# Patient Record
Sex: FEMALE | Race: WHITE | Employment: OTHER | URBAN - METROPOLITAN AREA
[De-identification: names, ages, dates, MRNs, and addresses within clinical notes are randomized per-mention and may not be internally consistent; named-entity substitution may affect disease eponyms.]

---

## 2017-01-03 ENCOUNTER — GENERIC CONVERSION - ENCOUNTER (OUTPATIENT)
Dept: OTHER | Facility: OTHER | Age: 72
End: 2017-01-03

## 2017-01-05 ENCOUNTER — GENERIC CONVERSION - ENCOUNTER (OUTPATIENT)
Dept: OTHER | Facility: OTHER | Age: 72
End: 2017-01-05

## 2017-01-12 ENCOUNTER — GENERIC CONVERSION - ENCOUNTER (OUTPATIENT)
Dept: OTHER | Facility: OTHER | Age: 72
End: 2017-01-12

## 2017-01-18 ENCOUNTER — GENERIC CONVERSION - ENCOUNTER (OUTPATIENT)
Dept: OTHER | Facility: OTHER | Age: 72
End: 2017-01-18

## 2017-04-06 ENCOUNTER — GENERIC CONVERSION - ENCOUNTER (OUTPATIENT)
Dept: OTHER | Facility: OTHER | Age: 72
End: 2017-04-06

## 2017-05-10 ENCOUNTER — ALLSCRIPTS OFFICE VISIT (OUTPATIENT)
Dept: OTHER | Facility: OTHER | Age: 72
End: 2017-05-10

## 2017-05-10 ENCOUNTER — GENERIC CONVERSION - ENCOUNTER (OUTPATIENT)
Dept: OTHER | Facility: OTHER | Age: 72
End: 2017-05-10

## 2017-05-10 DIAGNOSIS — I10 ESSENTIAL (PRIMARY) HYPERTENSION: ICD-10-CM

## 2017-05-10 DIAGNOSIS — E11.69 TYPE 2 DIABETES MELLITUS WITH OTHER SPECIFIED COMPLICATION (HCC): ICD-10-CM

## 2017-05-10 DIAGNOSIS — K76.0 FATTY (CHANGE OF) LIVER, NOT ELSEWHERE CLASSIFIED: ICD-10-CM

## 2017-05-10 DIAGNOSIS — F41.8 OTHER SPECIFIED ANXIETY DISORDERS: ICD-10-CM

## 2017-05-10 DIAGNOSIS — E11.9 TYPE 2 DIABETES MELLITUS WITHOUT COMPLICATIONS (HCC): ICD-10-CM

## 2017-06-21 ENCOUNTER — ALLSCRIPTS OFFICE VISIT (OUTPATIENT)
Dept: OTHER | Facility: OTHER | Age: 72
End: 2017-06-21

## 2017-09-18 ENCOUNTER — ALLSCRIPTS OFFICE VISIT (OUTPATIENT)
Dept: OTHER | Facility: OTHER | Age: 72
End: 2017-09-18

## 2017-09-18 LAB
BILIRUB UR QL STRIP: 1
CLARITY UR: NORMAL
COLOR UR: CLEAR
GLUCOSE (HISTORICAL): NORMAL
HGB UR QL STRIP.AUTO: NORMAL
KETONES UR STRIP-MCNC: NORMAL MG/DL
LEUKOCYTE ESTERASE UR QL STRIP: 75
NITRITE UR QL STRIP: NORMAL
PH UR STRIP.AUTO: 5 [PH]
PROT UR STRIP-MCNC: NORMAL MG/DL
SP GR UR STRIP.AUTO: 1.02
UROBILINOGEN UR QL STRIP.AUTO: NORMAL

## 2017-09-20 LAB
CULTURE RESULT (HISTORICAL): ABNORMAL
MISCELLANEOUS LAB TEST RESULT (HISTORICAL): ABNORMAL
RESULT 2 (HISTORICAL): ABNORMAL
SUSCEP. REFLEX (HISTORICAL): ABNORMAL

## 2017-09-24 ENCOUNTER — GENERIC CONVERSION - ENCOUNTER (OUTPATIENT)
Dept: OTHER | Facility: OTHER | Age: 72
End: 2017-09-24

## 2017-11-03 ENCOUNTER — ALLSCRIPTS OFFICE VISIT (OUTPATIENT)
Dept: OTHER | Facility: OTHER | Age: 72
End: 2017-11-03

## 2017-11-07 NOTE — PROGRESS NOTES
Assessment  1  Asthma (493 90) (J45 909)   2  Acute bronchitis (466 0) (J20 9)    Plan  Asthma    · Advair Diskus 500-50 MCG/DOSE Inhalation Aerosol Powder Breath Activated;  INHALE 1 PUFF TWICE DAILY   · Albuterol Sulfate (2 5 MG/3ML) 0 083% Inhalation Nebulization Solution; USE 1  VIAL VIA NEBULIZER FOUR TIMES DAILY AS NEEDED   · Montelukast Sodium 10 MG Oral Tablet; 1 EVERY MORNING  PMH: Asthmatic bronchitis    · Azithromycin 250 MG Oral Tablet (Zithromax Z-Peter); TAKE 2 TABLETS ON DAY 1  THEN TAKE 1 TABLET A DAY FOR 4 DAYS   · PredniSONE 10 MG Oral Tablet; 4 tabs po d1, 3 tabs po d2, 2 tabs po d3, 1 tab  po d4, 1/2 tab po d5&6    Discussion/Summary  Possible side effects of new medications were reviewed with the patient/guardian today  The treatment plan was reviewed with the patient/guardian  The patient/guardian understands and agrees with the treatment plan      Chief Complaint  pt here for f/u for asthma   she hasn't been feeling well (breathing)  tc/cma      Advance Directives  Advance Directive  Luke:   The patient is not in agreement to receive the Advance Care Planning service--    NO - Patient does not have an advance health care directive  Summary of Advance Directive Conversation  pt was given 5 wishes to read and return  Active Problems  1  Acute bronchitis (466 0) (J20 9)   2  Asthma (493 90) (J45 909)   3  Benign essential hypertension (401 1) (I10)   4  Borderline hyperlipidemia (272 4) (E78 5)   5  Depression with anxiety (300 4) (F41 8)   6  DM type 2 with diabetic mixed hyperlipidemia (250 80,272  2) (E11 69,E78 2)   7  Dysuria (788 1) (R30 0)   8  Eczema (692 9) (L30 9)   9  Elevated ALT measurement (790 4) (R74 0)   10  Encounter for other screening for malignant neoplasm of breast (V76 19) (Z12 39)   11  Encounter for screening colonoscopy (V76 51) (Z12 11)   12  Encounter for screening mammogram for malignant neoplasm of breast (V76 12)    (Z12 31)   13   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)   14  Gastroesophageal reflux disease with esophagitis (530 11) (K21 0)   15  Hair loss (704 00) (L65 9)   16  Hyperlipidemia (272 4) (E78 5)   17  Medicare annual wellness visit, subsequent (V70 0) (Z00 00)   18  Mitral valve disorder (424 0) (I05 9)   19  Nonalcoholic fatty liver disease (571 8) (K76 0)   20  Oral thrush (112 0) (B37 0)   21  Osteoporosis (733 00) (M81 0)   22  Pulmonary nodule seen on imaging study (793 11) (R91 1)   23  Risk of exposure to Lyme disease (V15 89) (Z91 89)   24  Severe persistent asthma with acute exacerbation (493 92) (J45 51)   25  Suspected sleep apnea (780 57) (G47 30)   26  Type 2 diabetes mellitus (250 00) (E11 9)   27  Vitamin D deficiency (268 9) (E55 9)    Past Medical History  1  History of Abdominal pain, epigastric (789 06) (R10 13)   2  History of Abdominal pain, LLQ (left lower quadrant) (789 04) (R10 32)   3  History of Acute febrile illness (780 60) (R50 9)   4  History of Acute upper respiratory infection (465 9) (J06 9)   5  History of Asthma with acute exacerbation (493 92) (J45 901)   6  History of Asthmatic bronchitis (493 90) (J45 909)   7  History of Bilateral renal cysts (753 10) (N28 1)   8  History of Bloating (787 3) (R14 0)   9  History of BMI 38 0-38 9,adult (V85 38) (Z68 38)   10  History of Body mass index (BMI) of 34 0 to 34 9 in adult (V85 34) (Z68 34)   11  History of Body mass index (BMI) of 37 0 to 37 9 in adult (V85 37) (Z68 37)   12  History of Cough (786 2) (R05)   13  History of Cough (786 2) (R05)   14  History of Eczema (692 9) (L30 9)   15  History of Female pelvic pain (625 9) (R10 2)   16  History of Hirsutism (704 1) (L68 0)   17  History of acute bronchitis (V12 69) (Z87 09)   18  History of acute bronchitis (V12 69) (Z87 09)   19  History of acute bronchitis (V12 69) (Z87 09)   20  History of acute pharyngitis (V12 69) (Z87 09)   21  History of acute sinusitis (V12 69) (Z87 09)   22   History of allergic rhinitis (V12 69) (Z87 09)   23  History of arthritis (V13 4) (Z87 39)   24  History of dermatitis (V13 3) (Z87 2)   25  History of fever (V13 89) (Z87 898)   26  History of heartburn (V12 79) (Z87 898)   27  History of hiatal hernia (V12 79) (Z87 19)   28  History of pleurisy (V12 69) (Z87 09)   29  History of pneumonia (V12 61) (Z87 01)   30  History of sinusitis (V12 69) (Z87 09)   31  History of Hot flashes (627 2) (N95 1)   32  History of Nasal congestion (478 19) (R09 81)   33  History of Right-sided chest wall pain (786 52) (R07 89)   34  History of Sinusitis (473 9) (J32 9)   35  History of Sore in nose (478 19) (J34 89)   36  History of Umbilical hernia (619 6) (K42 9)    Surgical History  1  History of Appendectomy   2  History of Incisional Breast Biopsy   3  History of Ovarian Cystectomy   4  History of Tonsillectomy    Family History  Mother    1  Family history of Benign essential hypertension with target blood pressure below 140/90   2  Family history of Ovarian Cancer (V16 41)  Father    3  Family history of Benign essential hypertension with target blood pressure below 140/90    Social History   · Being A Social Drinker   · Daily alcohol use   · Former smoker (V15 82) (H57 902)   · Marital History - Currently    ·    · Occupation   · Denied: History of Pets / animals   · Denied: History of Second hand smoke exposure   · Denied: History of Travel history    Current Meds   1  Advair Diskus 500-50 MCG/DOSE Inhalation Aerosol Powder Breath Activated; INHALE 1   PUFF TWICE DAILY; Therapy: 21Jun2017 to (Last Rx:21Jun2017) Ordered   2  Albuterol Sulfate (2 5 MG/3ML) 0 083% Inhalation Nebulization Solution; USE 1 VIAL VIA   NEBULIZER FOUR TIMES DAILY AS NEEDED; Therapy: 90TJG8699 to (Evaluate:21Jun2017)  Requested for: 91MPX1624; Last   Rx:10May2017 Ordered   3  ALPRAZolam 0 5 MG Oral Tablet; ONE TAB PO BID PRN; Therapy: 72LNT8493 to (Last Rx:97Dlh3975) Ordered   4   AmLODIPine Besylate 5 MG Oral Tablet; One tab po qd; Therapy: 02Apr2012 to (Last Rx:84Mjc8801)  Requested for: 14CBC2081 Ordered   5  Betamethasone Dipropionate 0 05 % External Cream; APPLY SPARINGLY TO AFFECTED   AREA(S) TWICE DAILY; Therapy: 03RCW8450 to (Last Rx:10May2017)  Requested for: 24BUD2326 Ordered   6  Biotin 5000 MCG Oral Capsule; TAKE 2 CAPSULE Daily; Therapy: (Recorded:04Nov2016) to Recorded   7  Caltrate 600+D 600-400 MG-UNIT TABS; 1 Two Times A Day; Therapy: 22PLZ2667 to  Requested for: 23YTN3691 Recorded   8  Clotrimazole-Betamethasone 1-0 05 % External Cream; APPLY SPARINGLY TO THE   AFFECTED AREA(S) TWICE DAILY; Therapy: 39SOO1636 to (Evaluate:14Aol3673)  Requested for: 72JBG4219; Last   Rx:78Say8062 Ordered   9  Magnesium 250 MG Oral Tablet; Therapy: (Recorded:43Sxr2760) to Recorded   10  Montelukast Sodium 10 MG Oral Tablet; 1 EVERY MORNING; Therapy: 02Apr2012 to (Last Rx:10May2017)  Requested for: 53API6122 Ordered   11  Nitrofurantoin Monohyd Macro 100 MG Oral Capsule; one bid x 7d; Therapy: 39ITJ6526 to (Last Louie Jang)  Requested for: 36ZZP7769 Ordered   12  Omeprazole 40 MG Oral Capsule Delayed Release; one cap po qd; Last Rx:95Xhi4046    Ordered   13  Simvastatin 20 MG Oral Tablet; One tab po qd; Therapy: 02Apr2012 to (Last Rx:10May2017)  Requested for: 23GHK2172 Ordered   14  Vitamin B Complex Oral Tablet; 1 Every Day; Therapy: 47RWU1813 to  Requested for: 79NIO8251 Recorded   15  Vitamin D-1000 Max [address] 1000 UNIT Oral Tablet; 2 Every Morning; Therapy: 21PCI2331 to  Requested for: 07RVR4319 Recorded   16  Zyrtec 10 MG TABS; one qd; Therapy: 39Wdu4650 to  Requested for: 92KGO3333 Recorded    Allergies  1  ASPIRIN   2  CIPRO   3  Cimetidine TABS  4  Eggs   5  Hazelnuts   6   Nuts    Vitals  Vital Signs    Recorded: 86ZQZ7541 09:34AM   Temperature 98 9 F, Temporal   Heart Rate 84, R Radial   Pulse Quality Normal, R Radial   Respiration Quality Normal   Respiration 16   Systolic 018, LUE, Sitting Diastolic 68, LUE, Sitting   Height 5 ft 4 in   Weight 194 lb    BMI Calculated 33 3   BSA Calculated 1 93   O2 Saturation 95     Health Management  Encounter for screening mammogram for malignant neoplasm of breast   * MAMMO SCREENING BILATERAL W CAD; every 1 year;  Last 29LLI8060; Next Due: 93ITS8555;  Near Due    Signatures   Electronically signed by : RONNY Valadez ; Nov 6 2017  8:17AM EST                       (Author)

## 2017-12-13 ENCOUNTER — ALLSCRIPTS OFFICE VISIT (OUTPATIENT)
Dept: OTHER | Facility: OTHER | Age: 72
End: 2017-12-13

## 2018-01-25 VITALS
SYSTOLIC BLOOD PRESSURE: 156 MMHG | RESPIRATION RATE: 16 BRPM | DIASTOLIC BLOOD PRESSURE: 86 MMHG | TEMPERATURE: 98.3 F | SYSTOLIC BLOOD PRESSURE: 144 MMHG | SYSTOLIC BLOOD PRESSURE: 126 MMHG | DIASTOLIC BLOOD PRESSURE: 72 MMHG | WEIGHT: 186 LBS | WEIGHT: 194 LBS | WEIGHT: 206 LBS | BODY MASS INDEX: 35.17 KG/M2 | SYSTOLIC BLOOD PRESSURE: 130 MMHG | TEMPERATURE: 98.9 F | OXYGEN SATURATION: 95 % | WEIGHT: 202 LBS | TEMPERATURE: 97.9 F | HEART RATE: 80 BPM | RESPIRATION RATE: 16 BRPM | OXYGEN SATURATION: 95 % | HEART RATE: 82 BPM | OXYGEN SATURATION: 93 % | RESPIRATION RATE: 22 BRPM | TEMPERATURE: 98.2 F | BODY MASS INDEX: 31.76 KG/M2 | HEIGHT: 64 IN | BODY MASS INDEX: 33.12 KG/M2 | DIASTOLIC BLOOD PRESSURE: 64 MMHG | DIASTOLIC BLOOD PRESSURE: 68 MMHG | HEIGHT: 64 IN | BODY MASS INDEX: 34.49 KG/M2 | HEART RATE: 84 BPM | RESPIRATION RATE: 18 BRPM | HEIGHT: 64 IN | OXYGEN SATURATION: 97 % | HEIGHT: 64 IN | HEART RATE: 90 BPM

## 2018-01-25 NOTE — MISCELLANEOUS
Message  Called pt to notify her of the results of the CT chest       Signatures   Electronically signed by : RONNY Peres ; Jan 19 2017  1:56PM EST                       (Author)

## 2018-01-25 NOTE — RESULT NOTES
Verified Results  Urine Dip Automated- POC 01Qjl2941 11:43AM Larissa Bone     Test Name Result Flag Reference   Color Clear     Clarity Transparent     Leukocytes 75     Nitrite neg     Blood neg     Bilirubin 1     Urobilinogen norm     Protein neg     Ph 5     Specific Gravity 1 020     Ketone neg     Glucose norm         Plan  Acute bronchitis, Dysuria    · Sulfamethoxazole-Trimethoprim 800-160 MG Oral Tablet; 1tab po bid  Dysuria    · (1) URINE CULTURE; Source:Urine, Clean Catch; Status:Active;  Requested  for:36Cir3122;    · Urine Dip Automated- POC; Status:Complete;   Done: 33ELS9170 11:43AM

## 2018-01-25 NOTE — RESULT NOTES
Verified Results  * XR CHEST PA & LATERAL 48Grf2935 03:00PM Steven Dumont Order Number: [id number]     Test Name Result Flag Reference   XR CHEST PA & LATERAL (Report)     CHEST      INDICATION: Chest pain  COMPARISON: 10/5/2015     VIEWS: Frontal and lateral projections; 2 images     FINDINGS:        Cardiomediastinal silhouette appears unremarkable  The lungs are clear  Scarring at the left lung base stable  No pneumothorax or pleural effusion  Visualized osseous structures appear within normal limits for the patient's age  IMPRESSION:     No active pulmonary disease  Workstation performed: [id number]     Signed by:   Rik Colon MD   8/29/16       Discussion/Summary   chest xray showing no active disease or infection  She should keep her recheck on wed Signatures   Electronically signed by : ELBA Christensen;  Aug 30 2016  9:26AM EST                       (Author)

## 2018-01-25 NOTE — RESULT NOTES
Verified Results  (1) AMYLASE 71AOW3037 07:32AM [facility]     Test Name Result Flag Reference   Amylase, Serum 50 U/L       (1) CBC/PLT/DIFF 20ZLP0946 07:32AM [facility]     Test Name Result Flag Reference   WBC 5 3 x10E3/uL  3 4-10 8   RBC 4 85 x10E6/uL  3 77-5 28   Hemoglobin 15 0 g/dL  11 1-15 9   Hematocrit 43 6 %  34 0-46  6   MCV 90 fL  79-97   MCH 30 9 pg  26 6-33 0   MCHC 34 4 g/dL  31 5-35 7   RDW 13 0 %  12 3-15 4   Platelets 438 D85O0/LN  150-379   Neutrophils 33 %     Lymphs 57 %     Monocytes 5 %     Eos 4 %     Basos 1 %     Neutrophils (Absolute) 1 8 x10E3/uL  1 4-7 0   Lymphs (Absolute) 3 1 x10E3/uL  0 7-3 1   Monocytes(Absolute) 0 3 x10E3/uL  0 1-0 9   Eos (Absolute) 0 2 x10E3/uL  0 0-0 4   Baso (Absolute) 0 0 x10E3/uL  0 0-0 2   Immature Granulocytes 0 %     Immature Grans (Abs) 0 0 x10E3/uL  0 0-0 1     (1) COMPREHENSIVE METABOLIC PANEL 68BZU9303 01:02XG [facility]     Test Name Result Flag Reference   Glucose, Serum 105 mg/dL H 65-99   BUN 14 mg/dL  8-27   Creatinine, Serum 0 71 mg/dL  0 57-1 00   eGFR If NonAfricn Am 87 mL/min/1 73  >59   eGFR If Africn Am 100 mL/min/1 73  >59   BUN/Creatinine Ratio 20  11-26   Sodium, Serum 139 mmol/L  134-144   Potassium, Serum 4 2 mmol/L  3 5-5 2   Chloride, Serum 100 mmol/L     Carbon Dioxide, Total 22 mmol/L  18-29   Calcium, Serum 9 2 mg/dL  8 7-10 3   Protein, Total, Serum 6 5 g/dL  6 0-8 5   Albumin, Serum 4 2 g/dL  3 6-4 8   Globulin, Total 2 3 g/dL  1 5-4 5   A/G Ratio 1 8  1 1-2 5   Bilirubin, Total 0 7 mg/dL  0 0-1 2   Alkaline Phosphatase, S 66 IU/L     AST (SGOT) 23 IU/L  0-40   ALT (SGPT) 28 IU/L  0-32     (1) HEMOGLOBIN A1C 31TKR4855 07:32AM Mariella Ceron     Test Name Result Flag Reference   Hemoglobin A1c 5 7 % H 4 8-5 6   Pre-diabetes: 5 7 - 6 4           Diabetes: >6 4           Glycemic control for adults with diabetes: <7 0     (1) LIPASE 24Jun2016 07:32AM Juanita Chakraborty     Test Name Result Flag Reference   Lipase, Serum 27 U/L  0-59     (1) LIPID PANEL, FASTING 24Jun2016 07:32AM [facility]     Test Name Result Flag Reference   Cholesterol, Total 146 mg/dL  100-199   Triglycerides 144 mg/dL  0-149   HDL Cholesterol 49 mg/dL  >39   According to ATP-III Guidelines, HDL-C >59 mg/dL is considered a  negative risk factor for CHD  VLDL Cholesterol Sen 29 mg/dL  5-40   LDL Cholesterol Calc 68 mg/dL  0-99   T  Chol/HDL Ratio 3 0 ratio units  0 0-4 4   T  Chol/HDL Ratio                                                             Men  Women                                               1/2 Avg  Risk  3 4    3 3                                                   Avg Risk  5 0    4 4                                                2X Avg  Risk  9 6    7 1                                                3X Avg  Risk 23 4   11 0     (1) TSH 74PME0949 07:32AM [facility]     Test Name Result Flag Reference   TSH 1 920 uIU/mL  0 450-4 500     (1) VITAMIN D 25-HYDROXY 24Jun2016 07:32AM [facility]     Test Name Result Flag Reference   Vitamin D, 25-Hydroxy 37 2 ng/mL  30 0-100 0   Vitamin D deficiency has been defined by the [address] [facility] [address] practice guideline as a  level of serum 25-OH vitamin D less than 20 ng/mL (1,2)  The Endocrine Society went on to further define vitamin D  insufficiency as a level between 21 and 29 ng/mL (2)  1  IOM ([place] [employer])  2010  Dietary reference     intakes for calcium and D  [facility]: The     Mommy [place]arest  2  Alex MF, Love NC, Yoni MEAD, et al      Evaluation, treatment, and prevention of vitamin D     deficiency: an Endocrine Society clinical practice     guideline  JCEM  2011 Jul; 96(7):1911-30       [facility]) Cardiovascular Risk Assessment 24Jun2016 07:32AM [facility]     Test Name Result Flag Reference   Interpretation Note     -------------------------------  CARDIOVASCULAR REPORT:  -------------------------------  Current available clinical information suggests the  patient's risk is at least LOW  One major CHD risk factor is  present (age over 54)  If the patient has CHD or a CHD risk  equivalent, the risk category is high  If patient does not  have CHD or a CHD risk equivalent, consider use of the  Pooled Cohort Equations to estimate 10-year CVD risk, as  individuals with greater than 7 5% risk may warrant more  intensive therapy  The calculator can be found at:  http://tools  cardiosource org/IYSLO-Xudm-Leawtubdh/  -  Insulin resistance, obesity, excessive alcohol use, smoking,  nephrotic syndrome, liver disease, and certain medications  can cause secondary dyslipidemia  Consider evaluation if  clinically indicated  -  Therapeutic lifestyle changes are always valuable to achieve  optimal blood lipid status (diet, exercise, weight  management)  -------------------------------  LIPID MANAGEMENT  Select one patient risk category based upon medical history  and clinical judgment  Additional risk factors such as  personal or family history of premature CHD, smoking, and  hypertension modify a patient's goals of therapy  In CVD  prevention, the intensity of therapy should be adjusted to  the level of patient risk  MODERATE intensity statin therapy  generally results in an average LDL-C reduction of 30% to  less than 50% from the untreated baseline  Examples include  (daily doses): atorvastatin 10-20 mg, rosuvastatin 5-10 mg,  simvastatin 20-40 mg, pravastatin 40-80 mg, lovastatin 40  mg  HIGH intensity statin therapy generally results in an  average LDL-C reduction of 50% or more from the untreated  baseline  Examples include (daily doses): atorvastatin 40-80  mg and rosuvastatin 20 mg   -------------------------------  LOW RISK ASSESSMENT AND TREATMENT SUGGESTIONS  -------------------------------  LDL-C is optimal, was 75 and now is 68 mg/dL   Non-HDL  Cholesterol is optimal, was 98 and now is 97 mg/dL  -  Considerations for use of statin therapy include family  history of premature atherosclerotic disease, elevated  coronary artery calcium score, ankle-brachial index < 0 9,  elevated CRP, or elevated 10-year CVD risk   -------------------------------  INTERMEDIATE RISK ASSESSMENT AND TREATMENT SUGGESTIONS  -------------------------------  LDL-C is optimal, was 75 and now is 68 mg/dL  Non-HDL  Cholesterol is optimal, was 98 and now is 97 mg/dL  -  Consider measurement of LDL particle number or Apo B to  adjudicate need for further LDL lowering therapy  Factors  that may influence statin use include family history of  premature atherosclerotic disease, elevated coronary artery  calcium score, ankle-brachial index < 0 9, elevated CRP, or  elevated 10-year CVD risk  If statin cannot be tolerated or  increased, alternatives include use of an intestinal agent  (ezetimibe or bile acid sequestrant) or niacin   -------------------------------  HIGH RISK ASSESSMENT AND TREATMENT SUGGESTIONS  -------------------------------  LDL-C is optimal, was 75 and now is 68 mg/dL  Non-HDL  Cholesterol is optimal, was 98 and now is 97 mg/dL  -  Continue statin if in use  Consider measurement of LDL  particle number or Apo B to adjudicate need for further LDL  lowering therapy  If statin cannot be tolerated or  increased, alternatives include use of an intestinal agent  (ezetimibe or bile acid sequestrant) or niacin   -------------------------------  DISCLAIMER  These assessments and treatment suggestions are provided as  a convenience in support of the physician-patient  relationship and are not intended to replace the physician's  clinical judgment  They are derived from the national  guidelines in addition to other evidence and expert opinion  The clinician should consider this information within the  context of clinical opinion and the individual patient    SEE GUIDANCE FOR CARDIOVASCULAR REPORT: [facility] [place]'s Third Report of the NCEP Expert  Panel on Detection, Evaluation and Treatment of High Blood  Cholesterol in Adults (ATP III) (2002  NIH publication  ); Ranjith et al  Diabetes Care 2008; 31(4):811-82;  Ynes et al  Clin Chem 2009; 55(3):407-419; Tasha Hancock et  al  2013 ACC/AHA guideline on the treatment of blood  cholesterol to reduce atherosclerotic cardiovascular risk in  adults: a report of the [employer] of  Cardiology/American Heart Association Task Force on Practice  Guidelines  Circulation 9231;030(ENNHF 2):S1? S45  PDF Image            Discussion/Summary   labs overall good-best regards-Dr Childress Ache

## 2018-01-27 ENCOUNTER — OFFICE VISIT (OUTPATIENT)
Dept: FAMILY MEDICINE CLINIC | Facility: CLINIC | Age: 73
End: 2018-01-27
Payer: MEDICARE

## 2018-01-27 VITALS
BODY MASS INDEX: 32.61 KG/M2 | WEIGHT: 191 LBS | HEART RATE: 78 BPM | RESPIRATION RATE: 16 BRPM | TEMPERATURE: 97.8 F | DIASTOLIC BLOOD PRESSURE: 80 MMHG | HEIGHT: 64 IN | SYSTOLIC BLOOD PRESSURE: 140 MMHG

## 2018-01-27 DIAGNOSIS — S60.511A INFECTED ABRASION OF RIGHT HAND, INITIAL ENCOUNTER: Primary | ICD-10-CM

## 2018-01-27 DIAGNOSIS — L08.9 INFECTED ABRASION OF RIGHT HAND, INITIAL ENCOUNTER: Primary | ICD-10-CM

## 2018-01-27 PROCEDURE — 99214 OFFICE O/P EST MOD 30 MIN: CPT | Performed by: FAMILY MEDICINE

## 2018-01-27 RX ORDER — ALPRAZOLAM 0.5 MG/1
1 TABLET ORAL 2 TIMES DAILY PRN
COMMUNITY
Start: 2017-05-10 | End: 2018-01-27 | Stop reason: CLARIF

## 2018-01-27 RX ORDER — ALBUTEROL SULFATE 2.5 MG/3ML
SOLUTION RESPIRATORY (INHALATION)
COMMUNITY
Start: 2011-05-16 | End: 2018-07-11 | Stop reason: SDUPTHER

## 2018-01-27 RX ORDER — CLOTRIMAZOLE AND BETAMETHASONE DIPROPIONATE 10; .64 MG/G; MG/G
CREAM TOPICAL 2 TIMES DAILY
COMMUNITY
Start: 2017-05-10 | End: 2018-01-27 | Stop reason: CLARIF

## 2018-01-27 RX ORDER — FLUTICASONE PROPIONATE AND SALMETEROL 232; 14 UG/1; UG/1
POWDER, METERED RESPIRATORY (INHALATION)
COMMUNITY
Start: 2017-12-13 | End: 2018-01-27 | Stop reason: CLARIF

## 2018-01-27 RX ORDER — CETIRIZINE HYDROCHLORIDE 10 MG/1
TABLET ORAL
COMMUNITY

## 2018-01-27 RX ORDER — AMOXICILLIN AND CLAVULANATE POTASSIUM 875; 125 MG/1; MG/1
1 TABLET, FILM COATED ORAL EVERY 12 HOURS SCHEDULED
Qty: 14 TABLET | Refills: 0 | Status: SHIPPED | OUTPATIENT
Start: 2018-01-27 | End: 2018-02-03

## 2018-01-27 RX ORDER — AMLODIPINE BESYLATE 5 MG/1
TABLET ORAL
COMMUNITY
Start: 2013-09-28 | End: 2018-04-30 | Stop reason: SDUPTHER

## 2018-01-27 RX ORDER — FLUTICASONE FUROATE AND VILANTEROL 100; 25 UG/1; UG/1
1 POWDER RESPIRATORY (INHALATION) DAILY
COMMUNITY
End: 2019-05-15

## 2018-01-27 NOTE — PROGRESS NOTES
Chief Complaint   Patient presents with    Hand Swelling     right hand  x 2 days         Patient ID: Tonia Vargas is a 81 y o  female  Pt seen and examined  Started with right hand swelling and pain x 2 days  Has been more red and more swollen  Unsure if was bitten by something   + pain  No fever  No joint pain except around the wrist             The following portions of the patient's history were reviewed and updated as appropriate: allergies, current medications, past family history, past medical history, past social history, past surgical history and problem list     Review of Systems   Constitutional: Negative for fatigue and fever  HENT: Negative for trouble swallowing  Respiratory: Negative for cough, choking and shortness of breath  Musculoskeletal: Positive for arthralgias  Skin: Positive for color change, rash and wound  Current Outpatient Prescriptions   Medication Sig Dispense Refill    albuterol (2 5 mg/3 mL) 0 083 % nebulizer solution Inhale      amLODIPine (NORVASC) 5 mg tablet       Biotin 5000 MCG CAPS Take 2 capsules by mouth daily      CALCIUM PO Take 600 mg by mouth      cetirizine (ZyrTEC) 10 mg tablet       fluticasone furoate-vilanterol (BREO ELLIPTA) Inhale 1 puff daily      amoxicillin-clavulanate (AUGMENTIN) 875-125 mg per tablet Take 1 tablet by mouth every 12 (twelve) hours for 7 days 14 tablet 0     No current facility-administered medications for this visit  Objective:    /80 (BP Location: Left arm, Patient Position: Sitting, Cuff Size: Adult)   Pulse 78   Temp 97 8 °F (36 6 °C)   Resp 16   Ht 5' 4" (1 626 m)   Wt 86 6 kg (191 lb)   BMI 32 79 kg/m²        Physical Exam   Constitutional: She is oriented to person, place, and time  She appears well-developed and well-nourished  HENT:   Head: Normocephalic  Eyes: Conjunctivae are normal    Neck: Normal range of motion  Neck supple     Cardiovascular: Normal rate and regular rhythm  Pulmonary/Chest: Effort normal and breath sounds normal    Musculoskeletal: Normal range of motion  Neurological: She is alert and oriented to person, place, and time  Skin:   + erythema and swelling contained on posterior right hand near thumb  No fluctuance  Psychiatric: She has a normal mood and affect  Assessment/Plan:    No problem-specific Assessment & Plan notes found for this encounter  Diagnoses and all orders for this visit:    Infected abrasion of right hand, initial encounter  Comments:  no streaking   will treat with abx  use ice  if worsens--call office immediately  Orders:  -     amoxicillin-clavulanate (AUGMENTIN) 875-125 mg per tablet; Take 1 tablet by mouth every 12 (twelve) hours for 7 days    Other orders  -     Discontinue: fluticasone-salmeterol (ADVAIR DISKUS) 500-50 mcg/dose; Inhale 1 puff 2 (two) times a day  -     Discontinue: Fluticasone-Salmeterol ([place] Potts 168/91) 915-78 MCG/ACT AEPB; Inhale  -     Discontinue: ALBUTEROL IN;   -     albuterol (2 5 mg/3 mL) 0 083 % nebulizer solution; Inhale  -     amLODIPine (NORVASC) 5 mg tablet;   -     Discontinue: ALPRAZolam (XANAX) 0 5 mg tablet; Take 1 tablet by mouth 2 (two) times a day as needed  -     Biotin 5000 MCG CAPS; Take 2 capsules by mouth daily  -     fluticasone furoate-vilanterol (BREO ELLIPTA); Inhale 1 puff daily  -     CALCIUM PO; Take 600 mg by mouth  -     cetirizine (ZyrTEC) 10 mg tablet;   -     Discontinue: clotrimazole-betamethasone (LOTRISONE) 1-0 05 % cream; Apply topically 2 (two) times a day                     Return if symptoms worsen or fail to improve            Amena Borges, DO

## 2018-01-27 NOTE — PATIENT INSTRUCTIONS
Cellulitis   AMBULATORY CARE:   Cellulitis  is a skin infection caused by bacteria  Cellulitis usually appears on the legs and feet, arms and hands, or face  Common signs and symptoms include the following:   · A red, warm, swollen area on your skin    · Pain when the area is touched    · Bumps or blisters (abscess) that may drain pus    · Bumpy, raised skin that feels like an orange peel  Call 911 if:   · You have sudden trouble breathing or chest pain  Seek care immediately if:   · Your wound gets larger and more painful  · You feel a crackling under your skin when you touch it  · You have purple dots or bumps on your skin, or you see bleeding under your skin  · You have new swelling and pain in your legs  · The red, warm, swollen area gets larger  · You see red streaks coming from the infected area  Contact your healthcare provider if:   · You have a fever  · Your fever or pain does not go away or gets worse  · The area does not get smaller after 2 days of antibiotics  · Your skin is flaking or peeling off  · You have questions or concerns about your condition or care  Treatment for cellulitis  may decrease symptoms, stop the infection from spreading, and cure the infection  Treatment depends on how severe your cellulitis is  Cellulitis may go away on its own  You may  instead need antibiotics to help treat the bacterial infection and medicines for pain  Your healthcare provider may draw a [place] around the edges of your cellulitis  If your cellulitis spreads, your healthcare provider will see it outside of the [place]  Manage your symptoms:   · Elevate the area above the level of your heart  as often as you can  This will help decrease swelling and pain  Prop the area on pillows or blankets to keep it elevated comfortably  · Clean the area daily until the wound scabs over  Gently wash the area with soap and water  Pat dry  Use dressings as directed       · Place cool or warm, wet cloths on the area as directed  Use clean cloths and clean water  Leave it on the area until the cloth is room temperature  Pat the area dry with a clean, dry cloth  The cloths may help decrease pain  Prevent cellulitis:   · Do not scratch bug bites or areas of injury  You increase your risk for cellulitis by scratching these areas  · Do not share personal items, such as towels, clothing, and razors  · Clean exercise equipment  with germ-killing  before and after you use it  · Wash your hands often  Use soap and water  Wash your hands after you use the bathroom, change a child's diapers, or sneeze  Wash your hands before you prepare or eat food  Use lotion to prevent dry, cracked skin  · Wear pressure stockings as directed  You may be told to wear the stockings if you have peripheral edema  The stockings improve blood flow and decrease swelling  · Treat athlete's foot  This can help prevent the spread of a bacterial skin infection  Follow up with your healthcare provider within 3 days, or as directed: Your healthcare provider will check if your cellulitis is getting better  You may need different medicine  Write down your questions so you remember to ask them during your visits  © 2017 [address]  Information is for End User's use only and may not be sold, redistributed or otherwise used for commercial purposes  All illustrations and images included in CareNotes® are the copyrighted property of A D A M [employer]  or Bree[employer]  The above information is an  only  It is not intended as medical advice for individual conditions or treatments  Talk to your doctor, nurse or pharmacist before following any medical regimen to see if it is safe and effective for you

## 2018-02-07 ENCOUNTER — OFFICE VISIT (OUTPATIENT)
Dept: FAMILY MEDICINE CLINIC | Facility: CLINIC | Age: 73
End: 2018-02-07
Payer: MEDICARE

## 2018-02-07 VITALS
TEMPERATURE: 98.3 F | DIASTOLIC BLOOD PRESSURE: 70 MMHG | BODY MASS INDEX: 33.29 KG/M2 | HEART RATE: 78 BPM | HEIGHT: 64 IN | RESPIRATION RATE: 16 BRPM | SYSTOLIC BLOOD PRESSURE: 140 MMHG | WEIGHT: 195 LBS

## 2018-02-07 DIAGNOSIS — J45.41 MODERATE PERSISTENT ASTHMA WITH ACUTE EXACERBATION: Primary | ICD-10-CM

## 2018-02-07 DIAGNOSIS — J04.0 LARYNGITIS: ICD-10-CM

## 2018-02-07 PROCEDURE — 99214 OFFICE O/P EST MOD 30 MIN: CPT | Performed by: FAMILY MEDICINE

## 2018-02-07 RX ORDER — AZITHROMYCIN 250 MG/1
TABLET, FILM COATED ORAL
Qty: 6 TABLET | Refills: 0 | Status: SHIPPED | OUTPATIENT
Start: 2018-02-07 | End: 2018-02-11

## 2018-02-07 RX ORDER — SIMVASTATIN 20 MG
TABLET ORAL
COMMUNITY
Start: 2018-01-27 | End: 2018-04-30 | Stop reason: ALTCHOICE

## 2018-02-07 RX ORDER — ALBUTEROL SULFATE 90 UG/1
2 AEROSOL, METERED RESPIRATORY (INHALATION) EVERY 6 HOURS PRN
Qty: 1 EACH | Refills: 2 | Status: SHIPPED | OUTPATIENT
Start: 2018-02-07 | End: 2018-02-23 | Stop reason: SDUPTHER

## 2018-02-07 RX ORDER — MONTELUKAST SODIUM 10 MG/1
TABLET ORAL
COMMUNITY
Start: 2018-01-27 | End: 2018-04-30 | Stop reason: SDUPTHER

## 2018-02-07 RX ORDER — PREDNISONE 20 MG/1
20 TABLET ORAL 2 TIMES DAILY
Qty: 14 TABLET | Refills: 0 | Status: SHIPPED | OUTPATIENT
Start: 2018-02-07 | End: 2018-02-14

## 2018-02-07 NOTE — PROGRESS NOTES
Chief Complaint   Patient presents with    Sore Throat        Patient ID: Lea Rascon is a 81 y o  female  HPI  Pt is seeing today for hoarseness of voice and wheezing with chest tightness started this am, no fever, no sore throat, has Asthma  -  On Breo -  Did not use rescue inhaler     The following portions of the patient's history were reviewed and updated as appropriate: allergies, current medications, past family history, past medical history, past social history, past surgical history and problem list     Review of Systems   Constitutional: Negative  HENT: Positive for voice change  Negative for congestion, ear pain, postnasal drip, sinus pain, sinus pressure and sore throat  Respiratory: Positive for chest tightness and wheezing  Negative for cough and shortness of breath  Gastrointestinal: Negative  Current Outpatient Prescriptions   Medication Sig Dispense Refill    albuterol (2 5 mg/3 mL) 0 083 % nebulizer solution Inhale      amLODIPine (NORVASC) 5 mg tablet       Biotin 5000 MCG CAPS Take 2 capsules by mouth daily      CALCIUM PO Take 600 mg by mouth      cetirizine (ZyrTEC) 10 mg tablet       fluticasone furoate-vilanterol (BREO ELLIPTA) Inhale 1 puff daily      albuterol (PROAIR HFA) 90 mcg/act inhaler Inhale 2 puffs every 6 (six) hours as needed for wheezing 1 each 2    azithromycin (ZITHROMAX) 250 mg tablet Take 2 tablets today then 1 tablet daily x 4 days 6 tablet 0    montelukast (SINGULAIR) 10 mg tablet       predniSONE 20 mg tablet Take 1 tablet (20 mg total) by mouth 2 (two) times a day for 7 days 14 tablet 0    simvastatin (ZOCOR) 20 mg tablet        No current facility-administered medications for this visit          Objective:    /70 (BP Location: Right arm, Patient Position: Sitting, Cuff Size: Large)   Pulse 78   Temp 98 3 °F (36 8 °C) (Tympanic)   Resp 16   Ht 5' 4" (1 626 m)   Wt 88 5 kg (195 lb)   BMI 33 47 kg/m²        Physical Exam Constitutional: She appears well-developed  HENT:   Mouth/Throat: Oropharynx is clear and moist  No oropharyngeal exudate  Cardiovascular: Normal rate  Pulmonary/Chest: Effort normal  No respiratory distress  She has wheezes  She has no rales  Skin: Skin is warm  Assessment/Plan:       Diagnoses and all orders for this visit:    Moderate persistent asthma with acute exacerbation  -     predniSONE 20 mg tablet; Take 1 tablet (20 mg total) by mouth 2 (two) times a day for 7 days  -     albuterol (PROAIR HFA) 90 mcg/act inhaler; Inhale 2 puffs every 6 (six) hours as needed for wheezing  -     azithromycin (ZITHROMAX) 250 mg tablet;  Take 2 tablets today then 1 tablet daily x 4 days    Laryngitis    Zpack on hold   rto prn                             Susen Koyanagi, MD

## 2018-02-13 ENCOUNTER — TELEPHONE (OUTPATIENT)
Dept: FAMILY MEDICINE CLINIC | Facility: CLINIC | Age: 73
End: 2018-02-13

## 2018-02-13 DIAGNOSIS — R05.9 COUGH: Primary | ICD-10-CM

## 2018-02-13 RX ORDER — PROMETHAZINE HYDROCHLORIDE AND CODEINE PHOSPHATE 6.25; 1 MG/5ML; MG/5ML
5 SYRUP ORAL EVERY 6 HOURS PRN
Qty: 120 ML | Refills: 0 | OUTPATIENT
Start: 2018-02-13 | End: 2018-07-11

## 2018-02-13 NOTE — MISCELLANEOUS
Message  Patient called and notified of CT chest results  I have asked that she bring the disc in to me for it to be personally reviewed        Signatures   Electronically signed by : RONNY Gustafson ; Jan 5 2017  2:17PM EST                       (Author)

## 2018-02-13 NOTE — MISCELLANEOUS
Message  Called pt to notify her of the results of her blood testing  No answer and no voicemail set up   Will try again  1/4- pt called again to notify her of the results  She is notified and recommended to see an allergist  She also states that her cough is not improved and therefore antibiotics are ordered for her        Signatures   Electronically signed by : RONNY Cote ; Jan 4 2017  4:06PM EST                       (Author)

## 2018-02-13 NOTE — TELEPHONE ENCOUNTER
Dr Estelle Marquez,  Pt  Will be finishing her zpak today  However, she still has a dry cough that is constant and keeps her up at night  She is leaving for a flight tonight at 2am   Requesting stronger cough med    She has been taking mucinex severe with no relief

## 2018-02-21 ENCOUNTER — TELEPHONE (OUTPATIENT)
Dept: FAMILY MEDICINE CLINIC | Facility: CLINIC | Age: 73
End: 2018-02-21

## 2018-02-21 DIAGNOSIS — J01.01 ACUTE RECURRENT MAXILLARY SINUSITIS: ICD-10-CM

## 2018-02-21 DIAGNOSIS — J40 BRONCHITIS: Primary | ICD-10-CM

## 2018-02-21 RX ORDER — AMOXICILLIN AND CLAVULANATE POTASSIUM 875; 125 MG/1; MG/1
1 TABLET, FILM COATED ORAL EVERY 12 HOURS SCHEDULED
Qty: 20 TABLET | Refills: 0 | Status: SHIPPED | OUTPATIENT
Start: 2018-02-21 | End: 2018-03-03

## 2018-02-21 RX ORDER — METHYLPREDNISOLONE 4 MG/1
TABLET ORAL
Qty: 21 TABLET | Refills: 0 | Status: SHIPPED | OUTPATIENT
Start: 2018-02-21 | End: 2018-05-12 | Stop reason: SDUPTHER

## 2018-02-21 NOTE — TELEPHONE ENCOUNTER
Dr Liz Kwok,     Patient said she went to Dr Esperanza Britt for a cough and she said that you called her and told her to get squeezed into your schedule because you wanted to follow up with her cough  I do not have the power to squeeze anyone or overbook so can you please call her and find the best time for you to get her in please?

## 2018-02-21 NOTE — TELEPHONE ENCOUNTER
Please add pt to my schedule Friday at 10am-thanks  Pt w cont cough/wheeze-sent rx for abx and steroid to pharmacy for pt to start until appt  If sxs worsen before visit advised pt to go to [address] [place] [address] [facility]

## 2018-02-23 ENCOUNTER — OFFICE VISIT (OUTPATIENT)
Dept: FAMILY MEDICINE CLINIC | Facility: CLINIC | Age: 73
End: 2018-02-23
Payer: MEDICARE

## 2018-02-23 ENCOUNTER — APPOINTMENT (OUTPATIENT)
Dept: RADIOLOGY | Facility: CLINIC | Age: 73
End: 2018-02-23
Payer: MEDICARE

## 2018-02-23 ENCOUNTER — TRANSCRIBE ORDERS (OUTPATIENT)
Dept: RADIOLOGY | Facility: CLINIC | Age: 73
End: 2018-02-23

## 2018-02-23 VITALS
BODY MASS INDEX: 32.95 KG/M2 | HEIGHT: 64 IN | HEART RATE: 80 BPM | DIASTOLIC BLOOD PRESSURE: 70 MMHG | OXYGEN SATURATION: 97 % | TEMPERATURE: 98 F | WEIGHT: 193 LBS | RESPIRATION RATE: 18 BRPM | SYSTOLIC BLOOD PRESSURE: 132 MMHG

## 2018-02-23 DIAGNOSIS — R05.9 COUGH: ICD-10-CM

## 2018-02-23 DIAGNOSIS — R06.2 WHEEZE: ICD-10-CM

## 2018-02-23 DIAGNOSIS — Z12.9 ENCOUNTER FOR SCREENING FOR MALIGNANT NEOPLASM: ICD-10-CM

## 2018-02-23 DIAGNOSIS — J45.41 MODERATE PERSISTENT ASTHMA WITH ACUTE EXACERBATION: Primary | ICD-10-CM

## 2018-02-23 PROCEDURE — 99213 OFFICE O/P EST LOW 20 MIN: CPT | Performed by: FAMILY MEDICINE

## 2018-02-23 PROCEDURE — 71046 X-RAY EXAM CHEST 2 VIEWS: CPT

## 2018-02-23 RX ORDER — IPRATROPIUM BROMIDE AND ALBUTEROL SULFATE 2.5; .5 MG/3ML; MG/3ML
3 SOLUTION RESPIRATORY (INHALATION)
Status: DISCONTINUED | OUTPATIENT
Start: 2018-02-23 | End: 2019-05-15

## 2018-02-23 RX ORDER — ALBUTEROL SULFATE 90 UG/1
2 AEROSOL, METERED RESPIRATORY (INHALATION) EVERY 4 HOURS PRN
Qty: 1 EACH | Refills: 3 | Status: SHIPPED | OUTPATIENT
Start: 2018-02-23 | End: 2018-03-25

## 2018-02-23 RX ADMIN — IPRATROPIUM BROMIDE AND ALBUTEROL SULFATE 3 ML: 2.5; .5 SOLUTION RESPIRATORY (INHALATION) at 11:57

## 2018-02-25 NOTE — PROGRESS NOTES
Assessment/Plan:           Diagnoses and all orders for this visit:    Moderate persistent asthma with acute exacerbation  -     albuterol (PROAIR HFA) 90 mcg/act inhaler; Inhale 2 puffs every 4 (four) hours as needed for wheezing for up to 30 days    Cough  -     XR chest pa & lateral; Future    Wheeze  -     XR chest pa & lateral; Future  -     ipratropium-albuterol (DUO-NEB) 0 5-2 5 mg/3 mL inhalation solution 3 mL; Take 3 mL by nebulization every 6 (six) hours     Encounter for screening for malignant neoplasm  -     Mammo screening bilateral w cad; Future            Subjective:      Patient ID: Trung Waldrop is a 81 y o  female  Chief Complaint   Patient presents with    Asthma     f/u tightness in case    URI       80 yo asthmatic pt in w ,Johnny for follow-up on asthma exacerbation-- patient was seen and txd in beginning f month by Dr Lorena Plascencia and steroid taper   -some initial improvement but then sxs worsened during recent trip to [facility] in [place]  Pt had increasingly difficult time w wheezing/shortness of breath  Currently w diffuse wheezing, no fever, pulse ox wnl  No rash or gi sx  The following portions of the patient's history were reviewed and updated as appropriate: allergies, current medications, past family history, past medical history, past social history, past surgical history and problem list      Review of Systems   Constitutional: Positive for fatigue  HENT: Positive for congestion, postnasal drip and sinus pressure  Negative for mouth sores  Eyes: Negative for discharge  Respiratory: Positive for chest tightness, shortness of breath and wheezing  Cardiovascular: Negative for palpitations and leg swelling  Gastrointestinal: Negative  Genitourinary: Negative for dysuria  Neurological: Negative  Psychiatric/Behavioral: Negative            Objective:    /70 (BP Location: Left arm, Patient Position: Sitting, Cuff Size: Standard)   Pulse 80 Temp 98 °F (36 7 °C)   Resp 18   Ht 5' 4" (1 626 m)   Wt 87 5 kg (193 lb)   SpO2 97%   BMI 33 13 kg/m²        Physical Exam   Constitutional: She is oriented to person, place, and time  OW, looks tired, acutely ill     HENT:   Mouth/Throat: No oropharyngeal exudate  Nasal bogginess, pngtt    Eyes: Conjunctivae are normal    Neck: Normal range of motion  Cardiovascular: Normal rate and regular rhythm  Pulmonary/Chest: She has wheezes  Abdominal: Soft  Bowel sounds are normal  There is no tenderness  Neurological: She is alert and oriented to person, place, and time  Skin: Skin is warm and dry  No rash noted  Psychiatric:   anxious   Nursing note and vitals reviewed                Royer Kim MD

## 2018-02-25 NOTE — PATIENT INSTRUCTIONS
Asthma   AMBULATORY CARE:   Asthma  is a lung disease that makes breathing difficult  Chronic inflammation and reactions to triggers narrow the airways in your lungs  Asthma can become life-threatening if it is not managed  Cough-variant asthma  is a type of asthma that causes a dry cough that keeps coming back  A dry cough may be your only symptom, or you may also have chest tightness  These symptoms may be caused by exercise or exposure to odors, allergens, or respiratory tract infections  Cough-variant asthma is treated the same way as typical asthma  Common symptoms include the following:   · Coughing     · Wheezing     · Shortness of breath     · Chest tightness  Seek care immediately if:   · You have severe shortness of breath  · Your lips or nails turn blue or gray  · The skin around your neck and ribs pulls in with each breath  · You have shortness of breath, even after you take your short-term medicine as directed  · Your peak flow numbers are in the red zone of your AAP  Contact your healthcare provider if:   · You run out of medicine before your next refill is due  · Your symptoms get worse  · You need to take more medicine than usual to control your symptoms  · You have questions or concerns about your condition or care  Treatment for asthma  will depend on how severe your asthma is  Medicine may decrease inflammation, open airways, and make it easier to breathe  Medicines may be inhaled, taken as a pill, or injected  Short-term medicines relieve your symptoms quickly  Long-term medicines are used to prevent future attacks  You may also need medicine to help control your allergies  Manage and prevent future asthma attacks:   · Follow your asthma action plan  This is a written plan that you and your healthcare provider create  It explains which medicine you need and when to change doses if necessary   It also explains how you can monitor symptoms and use a peak flow meter  The meter measures how well your lungs are working  · Manage other health conditions , such as allergies, acid reflux, and sleep apnea  · Identify and avoid triggers  These may include pets, dust mites, mold, and cockroaches  · Do not smoke or be around others who smoke  Nicotine and other chemicals in cigarettes and cigars can cause lung damage  Ask your healthcare provider for information if you currently smoke and need help to quit  E-cigarettes or smokeless tobacco still contain nicotine  Talk to your healthcare provider before you use these products  · Ask about the flu vaccine  The flu can make your asthma worse  You may need a yearly flu shot  Follow up with your healthcare provider as directed: You will need to return to make sure your medicine is working and your symptoms are controlled  You may be referred to an asthma or allergy specialist  Marcey Canavan may be asked to keep a record of your peak flow values and bring it with you to your appointments  Write down your questions so you remember to ask them during your visits  © 2017 [address] Information is for End User's use only and may not be sold, redistributed or otherwise used for commercial purposes  All illustrations and images included in CareNotes® are the copyrighted property of [employer] M , Inc  or [employer]  The above information is an  only  It is not intended as medical advice for individual conditions or treatments  Talk to your doctor, nurse or pharmacist before following any medical regimen to see if it is safe and effective for you

## 2018-02-26 ENCOUNTER — TELEPHONE (OUTPATIENT)
Dept: FAMILY MEDICINE CLINIC | Facility: CLINIC | Age: 73
End: 2018-02-26

## 2018-02-26 NOTE — TELEPHONE ENCOUNTER
Dr Liz Kwok:  Patient called looking for results from chest x-ray from Friday  Please call her with results

## 2018-04-30 DIAGNOSIS — E78.5 HYPERLIPIDEMIA, UNSPECIFIED HYPERLIPIDEMIA TYPE: ICD-10-CM

## 2018-04-30 DIAGNOSIS — J45.909 MODERATE ASTHMA WITHOUT COMPLICATION, UNSPECIFIED WHETHER PERSISTENT: ICD-10-CM

## 2018-04-30 DIAGNOSIS — I10 ESSENTIAL HYPERTENSION: Primary | ICD-10-CM

## 2018-04-30 RX ORDER — MONTELUKAST SODIUM 10 MG/1
10 TABLET ORAL
Qty: 90 TABLET | Refills: 3 | Status: SHIPPED | OUTPATIENT
Start: 2018-04-30 | End: 2019-03-01 | Stop reason: SDUPTHER

## 2018-04-30 RX ORDER — ROSUVASTATIN CALCIUM 5 MG/1
5 TABLET, COATED ORAL DAILY
Qty: 90 TABLET | Refills: 3 | Status: SHIPPED | OUTPATIENT
Start: 2018-04-30 | End: 2019-03-01 | Stop reason: SDUPTHER

## 2018-04-30 RX ORDER — AMLODIPINE BESYLATE 5 MG/1
5 TABLET ORAL DAILY
Qty: 90 TABLET | Refills: 3 | Status: SHIPPED | OUTPATIENT
Start: 2018-04-30 | End: 2019-03-01 | Stop reason: SDUPTHER

## 2018-05-12 ENCOUNTER — OFFICE VISIT (OUTPATIENT)
Dept: FAMILY MEDICINE CLINIC | Facility: CLINIC | Age: 73
End: 2018-05-12
Payer: MEDICARE

## 2018-05-12 VITALS
HEIGHT: 64 IN | DIASTOLIC BLOOD PRESSURE: 80 MMHG | TEMPERATURE: 98 F | SYSTOLIC BLOOD PRESSURE: 118 MMHG | RESPIRATION RATE: 16 BRPM | WEIGHT: 205 LBS | HEART RATE: 76 BPM | BODY MASS INDEX: 35 KG/M2

## 2018-05-12 DIAGNOSIS — R05.8 ALLERGIC COUGH: ICD-10-CM

## 2018-05-12 DIAGNOSIS — J45.41 MODERATE PERSISTENT ASTHMA WITH ACUTE EXACERBATION: Primary | ICD-10-CM

## 2018-05-12 PROCEDURE — 99213 OFFICE O/P EST LOW 20 MIN: CPT | Performed by: NURSE PRACTITIONER

## 2018-05-12 RX ORDER — OMEPRAZOLE 40 MG/1
CAPSULE, DELAYED RELEASE ORAL
COMMUNITY
End: 2020-01-06 | Stop reason: SDUPTHER

## 2018-05-12 RX ORDER — METHYLPREDNISOLONE 4 MG/1
TABLET ORAL
Qty: 21 TABLET | Refills: 0 | Status: SHIPPED | OUTPATIENT
Start: 2018-05-12 | End: 2018-07-11

## 2018-05-12 NOTE — PATIENT INSTRUCTIONS
Asthma   AMBULATORY CARE:   Asthma  is a lung disease that makes breathing difficult  Chronic inflammation and reactions to triggers narrow the airways in your lungs  Asthma can become life-threatening if it is not managed  Cough-variant asthma  is a type of asthma that causes a dry cough that keeps coming back  A dry cough may be your only symptom, or you may also have chest tightness  These symptoms may be caused by exercise or exposure to odors, allergens, or respiratory tract infections  Cough-variant asthma is treated the same way as typical asthma  Common symptoms include the following:   · Coughing     · Wheezing     · Shortness of breath     · Chest tightness  Seek care immediately if:   · You have severe shortness of breath  · Your lips or nails turn blue or gray  · The skin around your neck and ribs pulls in with each breath  · You have shortness of breath, even after you take your short-term medicine as directed  · Your peak flow numbers are in the red zone of your AAP  Contact your healthcare provider if:   · You run out of medicine before your next refill is due  · Your symptoms get worse  · You need to take more medicine than usual to control your symptoms  · You have questions or concerns about your condition or care  Treatment for asthma  will depend on how severe your asthma is  Medicine may decrease inflammation, open airways, and make it easier to breathe  Medicines may be inhaled, taken as a pill, or injected  Short-term medicines relieve your symptoms quickly  Long-term medicines are used to prevent future attacks  You may also need medicine to help control your allergies  Manage and prevent future asthma attacks:   · Follow your asthma action plan  This is a written plan that you and your healthcare provider create  It explains which medicine you need and when to change doses if necessary   It also explains how you can monitor symptoms and use a peak flow meter  The meter measures how well your lungs are working  · Manage other health conditions , such as allergies, acid reflux, and sleep apnea  · Identify and avoid triggers  These may include pets, dust mites, mold, and cockroaches  · Do not smoke or be around others who smoke  Nicotine and other chemicals in cigarettes and cigars can cause lung damage  Ask your healthcare provider for information if you currently smoke and need help to quit  E-cigarettes or smokeless tobacco still contain nicotine  Talk to your healthcare provider before you use these products  · Ask about the flu vaccine  The flu can make your asthma worse  You may need a yearly flu shot  Follow up with your healthcare provider as directed: You will need to return to make sure your medicine is working and your symptoms are controlled  You may be referred to an asthma or allergy specialist  Cecelia Evans may be asked to keep a record of your peak flow values and bring it with you to your appointments  Write down your questions so you remember to ask them during your visits  © 2017 [address] Information is for End User's use only and may not be sold, redistributed or otherwise used for commercial purposes  All illustrations and images included in CareNotes® are the copyrighted property of [employer]  or [employer]reezy Edwards  The above information is an  only  It is not intended as medical advice for individual conditions or treatments  Talk to your doctor, nurse or pharmacist before following any medical regimen to see if it is safe and effective for you

## 2018-05-12 NOTE — PROGRESS NOTES
Assessment:      Moderate persistent asthma with exacerbation r/t allergen exposure  no evidence of bacterial infection on exam  Plan:      Medications: continue current meds (advair, singulair, zrytec, albuterol)  Add short burst of steriods  Antibiotic not indicated at this time  Warning signs of respiratory distress were reviewed with the patient  Reduce exposure to inhaled allergens: vacuum 2x/week (the patient should not do the vacuuming), don't use humidifiers (may increased dust & mold) and keep windows close, use air conditioing  Discussed avoidance of precipitants  Discussed pathophysiology of asthma  Follow up in 3 days, or sooner should new symptoms or problems arise  Subjective:       Crissy Gonzales is a 72 y o  female who presents for chest tightness, non-productive cough and wheezing that worsened after opening windows to air out house for 2 days  The patient has been previously diagnosed with asthma  Acute symptoms currently include chest tightness, non-productive cough and wheezing    Observed precipitants include pollen exposure  Opened windows to "air out house " "[employer]  h[employer]"  Current limitations in activity from asthma: none  Allergy testing revealed allergy to mold, trees, pollens  Does not see pulmonologist   The following portions of the patient's history were reviewed and updated as appropriate: allergies, current medications, past family history, past medical history, past social history, past surgical history and problem list     Review of Systems  Pertinent items are noted in HPI        Objective:      Oxygen saturation 98% on room air  /80 (BP Location: Left arm, Patient Position: Sitting, Cuff Size: Adult)   Pulse 76   Temp 98 °F (36 7 °C) (Temporal)   Resp 16   Ht 5' 4" (1 626 m)   Wt 93 kg (205 lb)   BMI 35 19 kg/m²   General appearance: alert and oriented, in no acute distress  Throat: abnormal findings: mild oropharyngeal erythema  Lungs: Decreased breath sounds, scattered exp wheeze     Heart: regular rate and rhythm, S1, S2 normal, no murmur, click, rub or gallop  Pulses: 2+ and symmetric  Skin: NO pallor  Lymph nodes: Cervical, supraclavicular, and axillary nodes normal   Neurologic: Grossly normal

## 2018-05-15 ENCOUNTER — TELEPHONE (OUTPATIENT)
Dept: FAMILY MEDICINE CLINIC | Facility: CLINIC | Age: 73
End: 2018-05-15

## 2018-05-15 NOTE — TELEPHONE ENCOUNTER
Dr Gris Murphy,     Patient said that she has asthmatic bronchitis again and she got it Saturday so she came in and saw Marlee Hartman but she said she couldn't give her an antibiotic but she gave her Prednisone but she said that she has been taking it for 4 days now and feels worse, not better  Is there anything else that you can call in for her to the [facility]? Thank you

## 2018-05-16 DIAGNOSIS — J40 BRONCHITIS: Primary | ICD-10-CM

## 2018-05-16 RX ORDER — AZITHROMYCIN 250 MG/1
TABLET, FILM COATED ORAL
Qty: 6 TABLET | Refills: 0 | Status: SHIPPED | OUTPATIENT
Start: 2018-05-16 | End: 2018-05-20

## 2018-05-16 NOTE — TELEPHONE ENCOUNTER
Dr Amairani Knapp    Patient called back again to see if you can call in antibiotic to [facility]  She is still coughing a lot and does not want it to turn into [place]  Please let patient know what will be done, [phone]

## 2018-05-16 NOTE — TELEPHONE ENCOUNTER
Antibiotic was sent to pharm--if it does not help then pt will need to come in for re-eval-please inform-thanks

## 2018-06-21 DIAGNOSIS — J45.909 UNCOMPLICATED ASTHMA, UNSPECIFIED ASTHMA SEVERITY, UNSPECIFIED WHETHER PERSISTENT: Primary | ICD-10-CM

## 2018-07-11 ENCOUNTER — OFFICE VISIT (OUTPATIENT)
Dept: FAMILY MEDICINE CLINIC | Facility: CLINIC | Age: 73
End: 2018-07-11
Payer: MEDICARE

## 2018-07-11 VITALS
HEIGHT: 64 IN | DIASTOLIC BLOOD PRESSURE: 80 MMHG | OXYGEN SATURATION: 97 % | TEMPERATURE: 97.9 F | SYSTOLIC BLOOD PRESSURE: 140 MMHG | WEIGHT: 209 LBS | RESPIRATION RATE: 20 BRPM | HEART RATE: 92 BPM | BODY MASS INDEX: 35.68 KG/M2

## 2018-07-11 DIAGNOSIS — R53.83 OTHER FATIGUE: ICD-10-CM

## 2018-07-11 DIAGNOSIS — J40 BRONCHITIS: ICD-10-CM

## 2018-07-11 DIAGNOSIS — I10 ESSENTIAL HYPERTENSION: ICD-10-CM

## 2018-07-11 DIAGNOSIS — E78.5 HYPERLIPIDEMIA, UNSPECIFIED HYPERLIPIDEMIA TYPE: ICD-10-CM

## 2018-07-11 DIAGNOSIS — J45.909 MODERATE ASTHMA, UNSPECIFIED WHETHER COMPLICATED, UNSPECIFIED WHETHER PERSISTENT: Primary | ICD-10-CM

## 2018-07-11 PROCEDURE — 99214 OFFICE O/P EST MOD 30 MIN: CPT | Performed by: FAMILY MEDICINE

## 2018-07-11 RX ORDER — AZITHROMYCIN 250 MG/1
TABLET, FILM COATED ORAL
Qty: 6 TABLET | Refills: 0 | Status: SHIPPED | OUTPATIENT
Start: 2018-07-11 | End: 2018-07-15

## 2018-07-11 RX ORDER — ALBUTEROL SULFATE 2.5 MG/3ML
SOLUTION RESPIRATORY (INHALATION)
Qty: 1125 ML | Refills: 3 | Status: SHIPPED | OUTPATIENT
Start: 2018-07-11 | End: 2019-07-24 | Stop reason: SDUPTHER

## 2018-07-11 RX ORDER — PREDNISONE 20 MG/1
TABLET ORAL
Qty: 7 TABLET | Refills: 0 | Status: SHIPPED | OUTPATIENT
Start: 2018-07-11 | End: 2018-11-27 | Stop reason: SDUPTHER

## 2018-07-11 NOTE — PROGRESS NOTES
IAssessment/Plan:  Diagnoses and all orders for this visit:    Moderate asthma, unspecified whether complicated, unspecified whether persistent  Comments:  refilled albuterol vials for neb/cont inh use prn  ans singulair daily  Orders:  -     albuterol (2 5 mg/3 mL) 0 083 % nebulizer solution; Use 1 vial via nebulizer every 4 hours as needed for wheeze  -     azithromycin (ZITHROMAX) 250 mg tablet; Take 2 tablets today then 1 tablet daily x 4 days  -     predniSONE 20 mg tablet; 2 tabs po x2days , then 1 tab po x 2days, then 1/2 tab po x 2 days    Bronchitis  Comments:  txs zpk and prednisone taper  Orders:  -     azithromycin (ZITHROMAX) 250 mg tablet; Take 2 tablets today then 1 tablet daily x 4 days  -     predniSONE 20 mg tablet; 2 tabs po x2days , then 1 tab po x 2days, then 1/2 tab po x 2 days    Essential hypertension  Comments:  cont amlodipine, ch labs,cont low sodium , limited caffeine diet  Orders:  -     Comprehensive metabolic panel; Future    Hyperlipidemia, unspecified hyperlipidemia type  Comments:  cont ststin, ch labs  Orders:  -     TSH, 3rd generation; Future  -     Lipid panel; Future  -     Comprehensive metabolic panel; Future  -     Amylase; Future  -     Lipase; Future    Other fatigue  -     CBC and Platelet; Future         Subjective:      Patient ID: Nohemi Johnson is a 72 y o  female  Chief Complaint   Patient presents with    Cough    Nasal Congestion       Asthma   She complains of cough, sputum production and wheezing  There is no hemoptysis  This is a recurrent problem  The current episode started in the past 7 days  The problem has been gradually worsening  The cough is productive and harsh  Associated symptoms include malaise/fatigue, myalgias, nasal congestion, postnasal drip and rhinorrhea  Pertinent negatives include no appetite change, dyspnea on exertion, fever, sore throat or trouble swallowing  Her symptoms are aggravated by URI   Her past medical history is significant for asthma    using albuterol neb txs and inh/singulair w limited relief  Sys BP borderline high  No sig h/a, no CP  Due for labs  The following portions of the patient's history were reviewed and updated as appropriate: allergies, current medications, past family history, past medical history, past social history, past surgical history and problem list      Review of Systems   Constitutional: Positive for fatigue and malaise/fatigue  Negative for appetite change and fever  HENT: Positive for congestion, postnasal drip, rhinorrhea and sinus pressure  Negative for mouth sores, sore throat and trouble swallowing  Eyes: Negative for discharge  Respiratory: Positive for cough, sputum production and wheezing  Negative for hemoptysis  Cardiovascular: Negative  Negative for dyspnea on exertion  Gastrointestinal:        Occ GERD   Genitourinary: Negative for dysuria  Musculoskeletal: Positive for arthralgias and myalgias  Negative for neck stiffness  Skin: Negative for rash  Allergic/Immunologic: Positive for environmental allergies  Neurological: Negative  Psychiatric/Behavioral: Positive for sleep disturbance  The patient is nervous/anxious  Objective:    /80 (BP Location: Left arm, Patient Position: Sitting, Cuff Size: Large)   Pulse 92   Temp 97 9 °F (36 6 °C) (Tympanic)   Resp 20   Ht 5' 4" (1 626 m)   Wt 94 8 kg (209 lb)   SpO2 97%   BMI 35 87 kg/m²        Physical Exam   Constitutional: She is oriented to person, place, and time  HENT:   Mouth/Throat: No oropharyngeal exudate  Nasal bogginess,pngtt   Eyes: Conjunctivae are normal    Neck: Neck supple  Cardiovascular: Normal rate and regular rhythm  Pulmonary/Chest: Effort normal  She has wheezes  Abdominal: Soft  Bowel sounds are normal  There is no tenderness  Musculoskeletal: She exhibits tenderness  Neurological: She is alert and oriented to person, place, and time   No cranial nerve deficit  Skin: Skin is warm and dry  No rash noted  Psychiatric: She has a normal mood and affect  Nursing note and vitals reviewed          Killian Moreno MD

## 2018-09-15 LAB
ALBUMIN SERPL-MCNC: 4.3 G/DL (ref 3.5–4.8)
ALBUMIN/GLOB SERPL: 1.9 {RATIO} (ref 1.2–2.2)
ALP SERPL-CCNC: 68 IU/L (ref 39–117)
ALT SERPL-CCNC: 19 IU/L (ref 0–32)
AMBIG ABBREV DEFAULT: NORMAL
AMBIG ABBREV DEFAULT: NORMAL
AMYLASE SERPL-CCNC: 46 U/L (ref 31–124)
AST SERPL-CCNC: 17 IU/L (ref 0–40)
BILIRUB SERPL-MCNC: 1 MG/DL (ref 0–1.2)
BUN SERPL-MCNC: 13 MG/DL (ref 8–27)
BUN/CREAT SERPL: 18 (ref 12–28)
CALCIUM SERPL-MCNC: 8.9 MG/DL (ref 8.7–10.3)
CHLORIDE SERPL-SCNC: 100 MMOL/L (ref 96–106)
CHOLEST SERPL-MCNC: 158 MG/DL (ref 100–199)
CO2 SERPL-SCNC: 18 MMOL/L (ref 20–29)
CREAT SERPL-MCNC: 0.71 MG/DL (ref 0.57–1)
ERYTHROCYTE [DISTWIDTH] IN BLOOD BY AUTOMATED COUNT: 12.9 % (ref 12.3–15.4)
GLOBULIN SER-MCNC: 2.3 G/DL (ref 1.5–4.5)
GLUCOSE SERPL-MCNC: 120 MG/DL (ref 65–99)
HCT VFR BLD AUTO: 42.4 % (ref 34–46.6)
HDLC SERPL-MCNC: 75 MG/DL
HGB BLD-MCNC: 14.7 G/DL (ref 11.1–15.9)
LDLC SERPL CALC-MCNC: 61 MG/DL (ref 0–99)
LIPASE SERPL-CCNC: 19 U/L (ref 14–85)
MCH RBC QN AUTO: 31.7 PG (ref 26.6–33)
MCHC RBC AUTO-ENTMCNC: 34.7 G/DL (ref 31.5–35.7)
MCV RBC AUTO: 91 FL (ref 79–97)
MICRODELETION SYND BLD/T FISH: NORMAL
PLATELET # BLD AUTO: 215 X10E3/UL (ref 150–379)
POTASSIUM SERPL-SCNC: 4.2 MMOL/L (ref 3.5–5.2)
PROT SERPL-MCNC: 6.6 G/DL (ref 6–8.5)
RBC # BLD AUTO: 4.64 X10E6/UL (ref 3.77–5.28)
SL AMB EGFR AFRICAN AMERICAN: 99 ML/MIN/1.73
SL AMB EGFR NON AFRICAN AMERICAN: 86 ML/MIN/1.73
SL AMB VLDL CHOLESTEROL CALC: 22 MG/DL (ref 5–40)
SODIUM SERPL-SCNC: 139 MMOL/L (ref 134–144)
TRIGL SERPL-MCNC: 108 MG/DL (ref 0–149)
TSH SERPL DL<=0.005 MIU/L-ACNC: 2 UIU/ML (ref 0.45–4.5)
WBC # BLD AUTO: 13.5 X10E3/UL (ref 3.4–10.8)

## 2018-10-01 ENCOUNTER — TELEPHONE (OUTPATIENT)
Dept: FAMILY MEDICINE CLINIC | Facility: CLINIC | Age: 73
End: 2018-10-01

## 2018-10-01 NOTE — TELEPHONE ENCOUNTER
Dr Charo Pearce,  Pt  Now has medicare gap until the end of the year  Advair is now $500 to refill  Can she use the breo she has at home instead?  Ok to leave detailed msg

## 2018-10-04 ENCOUNTER — TELEPHONE (OUTPATIENT)
Dept: FAMILY MEDICINE CLINIC | Facility: CLINIC | Age: 73
End: 2018-10-04

## 2018-11-27 ENCOUNTER — OFFICE VISIT (OUTPATIENT)
Dept: FAMILY MEDICINE CLINIC | Facility: CLINIC | Age: 73
End: 2018-11-27
Payer: MEDICARE

## 2018-11-27 VITALS
TEMPERATURE: 98.2 F | WEIGHT: 203.6 LBS | SYSTOLIC BLOOD PRESSURE: 142 MMHG | DIASTOLIC BLOOD PRESSURE: 80 MMHG | BODY MASS INDEX: 34.76 KG/M2 | HEART RATE: 78 BPM | HEIGHT: 64 IN | RESPIRATION RATE: 16 BRPM | OXYGEN SATURATION: 97 %

## 2018-11-27 DIAGNOSIS — J45.909 UNCOMPLICATED ASTHMA, UNSPECIFIED ASTHMA SEVERITY, UNSPECIFIED WHETHER PERSISTENT: ICD-10-CM

## 2018-11-27 DIAGNOSIS — J40 BRONCHITIS: Primary | ICD-10-CM

## 2018-11-27 DIAGNOSIS — R06.2 WHEEZE: ICD-10-CM

## 2018-11-27 DIAGNOSIS — J41.0 CHRONIC BRONCHITIS, SIMPLE (HCC): ICD-10-CM

## 2018-11-27 PROCEDURE — 99213 OFFICE O/P EST LOW 20 MIN: CPT | Performed by: FAMILY MEDICINE

## 2018-11-27 RX ORDER — IPRATROPIUM BROMIDE AND ALBUTEROL SULFATE 2.5; .5 MG/3ML; MG/3ML
3 SOLUTION RESPIRATORY (INHALATION)
Status: CANCELLED | OUTPATIENT
Start: 2018-11-27

## 2018-11-27 RX ORDER — AZITHROMYCIN 250 MG/1
TABLET, FILM COATED ORAL
Qty: 6 TABLET | Refills: 2 | Status: SHIPPED | OUTPATIENT
Start: 2018-11-27 | End: 2018-12-01

## 2018-11-27 RX ORDER — IPRATROPIUM BROMIDE AND ALBUTEROL SULFATE 2.5; .5 MG/3ML; MG/3ML
3 SOLUTION RESPIRATORY (INHALATION) ONCE
Status: DISCONTINUED | OUTPATIENT
Start: 2018-11-27 | End: 2018-11-28

## 2018-11-27 RX ORDER — PREDNISONE 20 MG/1
TABLET ORAL
Qty: 7 TABLET | Refills: 3 | Status: SHIPPED | OUTPATIENT
Start: 2018-11-27 | End: 2019-01-09 | Stop reason: SDUPTHER

## 2018-11-27 RX ORDER — IPRATROPIUM BROMIDE AND ALBUTEROL SULFATE 2.5; .5 MG/3ML; MG/3ML
3 SOLUTION RESPIRATORY (INHALATION) EVERY 6 HOURS PRN
Qty: 25 VIAL | Refills: 0 | Status: SHIPPED | OUTPATIENT
Start: 2018-11-27 | End: 2020-09-05 | Stop reason: SDUPTHER

## 2018-11-27 RX ORDER — IPRATROPIUM BROMIDE AND ALBUTEROL SULFATE 2.5; .5 MG/3ML; MG/3ML
3 SOLUTION RESPIRATORY (INHALATION) EVERY 6 HOURS PRN
Qty: 25 VIAL | Refills: 3 | Status: SHIPPED | OUTPATIENT
Start: 2018-11-27 | End: 2019-02-04 | Stop reason: SDUPTHER

## 2018-11-28 NOTE — PROGRESS NOTES
Assessment/Plan:     Diagnoses and all orders for this visit:    Bronchitis  Comments:  txs zpk and prednisone taper  Orders:  -     predniSONE 20 mg tablet; 2 tabs po x2days , then 1 tab po x 2days, then 1/2 tab po x 2 days- take with food  -     azithromycin (ZITHROMAX) 250 mg tablet; Take 2 tablets today then 1 tablet daily x 4 days    Wheeze  Comments:  duo-neb tx given w good response  Orders:  -     ipratropium-albuterol (DUO-NEB) 0 5-2 5 mg/3 mL inhalation solution 3 mL; Take 3 mL by nebulization once   -     ipratropium-albuterol (DUO-NEB) 0 5-2 5 mg/3 mL nebulizer solution; Take 1 vial (3 mL total) by nebulization every 6 (six) hours as needed for wheezing or shortness of breath    Chronic bronchitis, simple (HCC)  Comments:  rx for duoneb vials given for prn use (not for concomitant use w albuterol)  Orders:  -     ipratropium-albuterol (DUO-NEB) 0 5-2 5 mg/3 mL nebulizer solution; Take 1 vial (3 mL total) by nebulization every 6 (six) hours as needed for wheezing or shortness of breath    Uncomplicated asthma, unspecified asthma severity, unspecified whether persistent  Comments:  cont singulair, advair daily, albuterol nebs prn when not using duoneb,  Orders:  -     ADVAIR DISKUS 500-50 MCG/DOSE inhaler; Inhale 1 puff 2 (two) times a day  -     ipratropium-albuterol (DUO-NEB) 0 5-2 5 mg/3 mL nebulizer solution; Take 1 vial (3 mL total) by nebulization every 6 (six) hours as needed for wheezing or shortness of breath    Other orders  -     Cancel: ipratropium-albuterol (DUO-NEB) 0 5-2 5 mg/3 mL inhalation solution 3 mL; Take 3 mL by nebulization every 6 (six) hours             Subjective:      Patient ID: Larissa Irwin is a 72 y o  female  Chief Complaint   Patient presents with    Cold Like Symptoms       Cough   This is a recurrent problem  The current episode started in the past 7 days  The problem has been gradually worsening  The cough is productive of sputum   Associated symptoms include myalgias, nasal congestion, postnasal drip, shortness of breath and wheezing  Pertinent negatives include no chest pain, fever, hemoptysis, rash or sore throat  The symptoms are aggravated by stress  She has tried leukotriene antagonists, rest, steroid inhaler and a beta-agonist inhaler for the symptoms  The treatment provided mild relief  Her past medical history is significant for asthma, bronchitis, environmental allergies and pneumonia  started during family gathering at [facility]--seems to trigger when in crowded room  No sig travel or specific known sick contact  No v/d or rash    The following portions of the patient's history were reviewed and updated as appropriate: allergies, current medications, past family history, past medical history, past social history, past surgical history and problem list      Review of Systems   Constitutional: Positive for fatigue  Negative for fever  HENT: Positive for postnasal drip  Negative for sore throat  Respiratory: Positive for cough, chest tightness, shortness of breath and wheezing  Negative for hemoptysis  Cardiovascular: Negative for chest pain, palpitations and leg swelling  Gastrointestinal: Negative for abdominal pain  Musculoskeletal: Positive for arthralgias and myalgias  Skin: Negative for rash  Allergic/Immunologic: Positive for environmental allergies  Psychiatric/Behavioral: Positive for sleep disturbance  Objective:    /80 (BP Location: Left arm, Patient Position: Sitting, Cuff Size: Large)   Pulse 78   Temp 98 2 °F (36 8 °C) (Temporal)   Resp 16   Ht 5' 4" (1 626 m)   Wt 92 4 kg (203 lb 9 6 oz)   SpO2 97% Comment: RA  BMI 34 95 kg/m²        Physical Exam   Constitutional:   Overweight, acutely ill   HENT:   Mouth/Throat: No oropharyngeal exudate  Nasal bogginess, pngtt   Eyes: Conjunctivae are normal  No scleral icterus  Neck: Neck supple     Cardiovascular: Normal rate, regular rhythm and intact distal pulses  Pulmonary/Chest: Effort normal  She has wheezes  Abdominal: Soft  Bowel sounds are normal  There is no tenderness  Skin: No rash noted  Psychiatric: She has a normal mood and affect  Nursing note and vitals reviewed          Adams Hidalgo MD

## 2019-01-09 ENCOUNTER — OFFICE VISIT (OUTPATIENT)
Dept: FAMILY MEDICINE CLINIC | Facility: CLINIC | Age: 74
End: 2019-01-09
Payer: MEDICARE

## 2019-01-09 VITALS
BODY MASS INDEX: 34.04 KG/M2 | SYSTOLIC BLOOD PRESSURE: 178 MMHG | RESPIRATION RATE: 18 BRPM | OXYGEN SATURATION: 96 % | DIASTOLIC BLOOD PRESSURE: 72 MMHG | TEMPERATURE: 98.9 F | HEART RATE: 90 BPM | HEIGHT: 64 IN | WEIGHT: 199.4 LBS

## 2019-01-09 DIAGNOSIS — R05.9 COUGH: ICD-10-CM

## 2019-01-09 DIAGNOSIS — I10 ESSENTIAL HYPERTENSION: ICD-10-CM

## 2019-01-09 DIAGNOSIS — Z12.9 ENCOUNTER FOR SCREENING FOR MALIGNANT NEOPLASM: ICD-10-CM

## 2019-01-09 DIAGNOSIS — J40 BRONCHITIS: Primary | ICD-10-CM

## 2019-01-09 PROCEDURE — 99213 OFFICE O/P EST LOW 20 MIN: CPT | Performed by: FAMILY MEDICINE

## 2019-01-09 RX ORDER — PREDNISONE 20 MG/1
TABLET ORAL
Qty: 7 TABLET | Refills: 3 | Status: SHIPPED | OUTPATIENT
Start: 2019-01-09 | End: 2019-04-28 | Stop reason: ALTCHOICE

## 2019-01-09 RX ORDER — PROMETHAZINE HYDROCHLORIDE AND CODEINE PHOSPHATE 6.25; 1 MG/5ML; MG/5ML
5 SYRUP ORAL EVERY 4 HOURS PRN
Qty: 180 ML | Refills: 0 | Status: SHIPPED | OUTPATIENT
Start: 2019-01-09 | End: 2019-03-03 | Stop reason: ALTCHOICE

## 2019-01-14 ENCOUNTER — TELEPHONE (OUTPATIENT)
Dept: FAMILY MEDICINE CLINIC | Facility: CLINIC | Age: 74
End: 2019-01-14

## 2019-01-14 ENCOUNTER — OFFICE VISIT (OUTPATIENT)
Dept: FAMILY MEDICINE CLINIC | Facility: CLINIC | Age: 74
End: 2019-01-14
Payer: MEDICARE

## 2019-01-14 VITALS
BODY MASS INDEX: 34.83 KG/M2 | WEIGHT: 204 LBS | HEART RATE: 100 BPM | SYSTOLIC BLOOD PRESSURE: 164 MMHG | HEIGHT: 64 IN | RESPIRATION RATE: 16 BRPM | DIASTOLIC BLOOD PRESSURE: 82 MMHG | TEMPERATURE: 98.5 F

## 2019-01-14 DIAGNOSIS — I10 ESSENTIAL HYPERTENSION: Primary | ICD-10-CM

## 2019-01-14 DIAGNOSIS — R06.00 DOE (DYSPNEA ON EXERTION): ICD-10-CM

## 2019-01-14 DIAGNOSIS — E78.5 HYPERLIPIDEMIA, UNSPECIFIED HYPERLIPIDEMIA TYPE: ICD-10-CM

## 2019-01-14 PROCEDURE — 99214 OFFICE O/P EST MOD 30 MIN: CPT | Performed by: FAMILY MEDICINE

## 2019-01-14 PROCEDURE — 93000 ELECTROCARDIOGRAM COMPLETE: CPT | Performed by: FAMILY MEDICINE

## 2019-01-14 NOTE — TELEPHONE ENCOUNTER
Aguila Rome March is concerned over bp  On Wednesday at ov it was 178/72  She took at home this am 186/92 and 192/84  She has been feeling dizzy on and off during the day but not at the moment  Appt   Scheduled for today

## 2019-01-16 NOTE — PROGRESS NOTES
Assessment/Plan:     Diagnoses and all orders for this visit:    Bronchitis  Comments:  txs zpk and prednisone taper  Orders:  -     predniSONE 20 mg tablet; 2 tabs po x2days , then 1 tab po x 2days, then 1/2 tab po x 2 days- take with food    Cough  -     promethazine-codeine (PHENERGAN WITH CODEINE) 6 25-10 mg/5 mL syrup; Take 5 mL by mouth every 4 (four) hours as needed for cough (Patient not taking: Reported on 1/14/2019 )    Encounter for screening for malignant neoplasm  Comments:  ref for mammogram given  Orders:  -     Mammo screening bilateral w cad; Future    Essential hypertension  Comments:  BP elevetd today-avoid NSAID and decongestant use, lower salt in diet, limit caffeine  follow-up post illness for BP check            Subjective:      Patient ID: Toby Noel is a 72 y o  female  Chief Complaint   Patient presents with    Cough     since kirstin    Nasal Congestion     chest       Cough   This is a recurrent problem  The current episode started 1 to 4 weeks ago  The problem has been gradually worsening  The cough is productive of sputum  Associated symptoms include nasal congestion, postnasal drip, rhinorrhea and a sore throat  Pertinent negatives include no chest pain, headaches, heartburn, hemoptysis or rash  She has tried a beta-agonist inhaler, leukotriene antagonists, prescription cough suppressant, rest and steroid inhaler for the symptoms  The treatment provided mild relief  Her past medical history is significant for asthma, bronchitis and environmental allergies  The following portions of the patient's history were reviewed and updated as appropriate: allergies, current medications, past family history, past medical history, past social history, past surgical history and problem list      Review of Systems   Constitutional: Positive for fatigue  HENT: Positive for congestion, postnasal drip, rhinorrhea, sinus pain and sore throat  Eyes: Negative for discharge  Respiratory: Positive for cough  Negative for hemoptysis  Cardiovascular: Negative for chest pain  Gastrointestinal: Negative for heartburn  Genitourinary: Negative for dysuria  Musculoskeletal: Positive for arthralgias  Skin: Negative for rash  Allergic/Immunologic: Positive for environmental allergies  Neurological: Negative for headaches  Objective:    BP (!) 178/72 (BP Location: Left arm, Patient Position: Sitting, Cuff Size: Large)   Pulse 90   Temp 98 9 °F (37 2 °C)   Resp 18   Ht 5' 4" (1 626 m)   Wt 90 4 kg (199 lb 6 4 oz)   SpO2 96%   BMI 34 23 kg/m²        Physical Exam   Constitutional: She is oriented to person, place, and time  OW, acutely ill   HENT:   Mouth/Throat: No oropharyngeal exudate  Nasal bogginess, pngtt  B/l facial tenderness   Eyes: Conjunctivae are normal  No scleral icterus  Cardiovascular: Normal rate and regular rhythm  Pulmonary/Chest: Effort normal  She has wheezes  Abdominal: Soft  Bowel sounds are normal  There is no tenderness  Neurological: She is alert and oriented to person, place, and time  No cranial nerve deficit  Skin: Skin is warm and dry  Nursing note and vitals reviewed          Sterling Hernandez MD

## 2019-02-04 ENCOUNTER — OFFICE VISIT (OUTPATIENT)
Dept: FAMILY MEDICINE CLINIC | Facility: CLINIC | Age: 74
End: 2019-02-04
Payer: MEDICARE

## 2019-02-04 VITALS
SYSTOLIC BLOOD PRESSURE: 144 MMHG | HEART RATE: 84 BPM | RESPIRATION RATE: 14 BRPM | TEMPERATURE: 99.4 F | DIASTOLIC BLOOD PRESSURE: 76 MMHG

## 2019-02-04 DIAGNOSIS — I10 BENIGN ESSENTIAL HYPERTENSION: Primary | ICD-10-CM

## 2019-02-04 PROCEDURE — 99213 OFFICE O/P EST LOW 20 MIN: CPT | Performed by: FAMILY MEDICINE

## 2019-02-04 RX ORDER — CHLORTHALIDONE 25 MG/1
25 TABLET ORAL DAILY
COMMUNITY
Start: 2019-01-31 | End: 2022-03-04 | Stop reason: HOSPADM

## 2019-02-04 RX ORDER — OLMESARTAN MEDOXOMIL 40 MG/1
40 TABLET ORAL DAILY
COMMUNITY
Start: 2019-01-31 | End: 2019-05-15

## 2019-02-04 RX ORDER — CALCIUM CARBONATE 300MG(750)
TABLET,CHEWABLE ORAL
COMMUNITY

## 2019-02-04 NOTE — PROGRESS NOTES
Assessment/Plan:  Diagnoses and all orders for this visit:    Benign essential hypertension  Comments:  rxs Amlodipine 5mg twice daily, Benicar 40mg daily and chlothalidone 25mg daily per cardio-  Dr Mary Ann Amos further testing/recommend  Other orders  -     chlorthalidone 25 mg tablet; Take 25 mg by mouth daily  -     fluticasone-salmeterol (ADVAIR DISKUS) 500-50 mcg/dose inhaler; Inhale 1 puff 2 (two) times a day  -     Magnesium 400 MG TABS; Take by mouth  -     olmesartan (BENICAR) 40 mg tablet; Take 40 mg by mouth daily                Subjective:      Patient ID: Umberto Sawyer is a 72 y o  female  Chief Complaint   Patient presents with    Blood Pressure Check     BP at home 185 83       HPI    73 yo pt in w -Juan J-for BP check  Saw cardio-Dr Lindsey--consult reviewed in chart  BP improving but not yet at goal  w med adjustments--further cardiac testing ordered by specialist-  Await results and any addtl tx recommendations  Overall pt feeling a bit better  Denies cp, h/a ,dizziness  No syncopal episodes  The following portions of the patient's history were reviewed and updated as appropriate: allergies, current medications, past family history, past medical history, past social history, past surgical history and problem list      Review of Systems   Constitutional: Positive for fatigue  Negative for fever  Eyes:        Wears glasses   Respiratory:        TIERNEY   Cardiovascular: Negative for chest pain  Gastrointestinal:        GERD   Musculoskeletal: Positive for arthralgias  Psychiatric/Behavioral: Positive for sleep disturbance  The patient is nervous/anxious  Objective:    /76 (BP Location: Left arm, Patient Position: Sitting, Cuff Size: Large)   Pulse 84   Temp 99 4 °F (37 4 °C) (Tympanic)   Resp 14        Physical Exam   Constitutional: She is oriented to person, place, and time  OW, NAD   Eyes: Conjunctivae are normal  No scleral icterus  Neck: Neck supple  Cardiovascular: Normal rate, regular rhythm and intact distal pulses  Murmur heard  Pulmonary/Chest: Effort normal and breath sounds normal  No respiratory distress  Abdominal: Soft  Bowel sounds are normal    Musculoskeletal: Normal range of motion  Neurological: She is alert and oriented to person, place, and time  No cranial nerve deficit  Skin: Skin is warm  Psychiatric:   anxious   Nursing note and vitals reviewed  Labs;  Labs in chart were reviewed        Slava Walters MD

## 2019-02-05 PROBLEM — R42 DIZZINESS: Status: ACTIVE | Noted: 2019-02-05

## 2019-02-05 PROBLEM — E66.9 OBESITY: Status: ACTIVE | Noted: 2019-02-05

## 2019-02-05 PROBLEM — I10 HYPERTENSION: Status: ACTIVE | Noted: 2019-02-05

## 2019-02-05 PROBLEM — Z91.89 SEDENTARY LIFESTYLE: Status: ACTIVE | Noted: 2019-02-05

## 2019-02-06 NOTE — PROGRESS NOTES
Assessment/Plan:   Diagnoses and all orders for this visit:    Essential hypertension  Comments:  new BP elevation  will start trial CCB  Advised also to lower sdoium and caffeine in diet and cont efforts at weight reduction,  Orders:  -     POCT ECG  -     Ambulatory referral to Cardiology; Future    TIERNEY (dyspnea on exertion)  Comments:  ? r/t asthma and deconditioing vs other- rec cardio eval to further eval   Orders:  -     Ambulatory referral to Cardiology; Future    Hyperlipidemia, unspecified hyperlipidemia type  Comments:  cont  statin, low chol diet  add omega-3 fishi oil supp daily  Orders:  -     Ambulatory referral to Cardiology; Future    Adult BMI 35 0-35 9 kg/sq m  Comments:  readdressed lifestyle modif re:diet/exercsie  Subjective:      Patient ID: Dilan Rodriguez is a 72 y o  female  Chief Complaint   Patient presents with    Hypertension    Dizziness       Hypertension   This is a new problem  The problem has been gradually worsening since onset  Associated symptoms include anxiety, malaise/fatigue and palpitations  Pertinent negatives include no chest pain, headaches or neck pain  Agents associated with hypertension include steroids and NSAIDs  Risk factors for coronary artery disease include dyslipidemia, obesity, stress, sedentary lifestyle and family history  Past treatments include lifestyle changes  Compliance problems include psychosocial issues, exercise and diet  Dizziness   This is a recurrent problem  The problem has been waxing and waning  Associated symptoms include arthralgias, congestion, fatigue, myalgias and a visual change  Pertinent negatives include no chest pain, fever, headaches, neck pain or rash  The symptoms are aggravated by stress and exertion         The following portions of the patient's history were reviewed and updated as appropriate: allergies, current medications, past family history, past medical history, past social history, past surgical history and problem list      Review of Systems   Constitutional: Positive for fatigue and malaise/fatigue  Negative for fever  HENT: Positive for congestion  Eyes:        Wears glasses   Respiratory: Negative  Cardiovascular: Positive for palpitations  Negative for chest pain  Elevated BP   Gastrointestinal: Negative  Musculoskeletal: Positive for arthralgias and myalgias  Negative for neck pain  Skin: Negative for rash  Allergic/Immunologic: Positive for environmental allergies  Neurological: Positive for dizziness  Negative for headaches  Psychiatric/Behavioral: Positive for sleep disturbance  The patient is nervous/anxious  Objective:    /82 (BP Location: Right arm)   Pulse 100   Temp 98 5 °F (36 9 °C)   Resp 16   Ht 5' 4" (1 626 m)   Wt 92 5 kg (204 lb)   BMI 35 02 kg/m²        Physical Exam   Constitutional: She is oriented to person, place, and time  OW, NAD   Eyes: Pupils are equal, round, and reactive to light  Conjunctivae and EOM are normal  No scleral icterus  Neck: Neck supple  Cardiovascular: Normal rate, regular rhythm and intact distal pulses  Pulmonary/Chest: Effort normal and breath sounds normal    Abdominal: Soft  Bowel sounds are normal  There is no tenderness  Musculoskeletal: Normal range of motion  Neurological: She is alert and oriented to person, place, and time  No cranial nerve deficit  Skin: No rash noted  Nursing note and vitals reviewed       Gaurav Douglas MD

## 2019-03-01 DIAGNOSIS — J45.909 MODERATE ASTHMA WITHOUT COMPLICATION, UNSPECIFIED WHETHER PERSISTENT: ICD-10-CM

## 2019-03-01 DIAGNOSIS — I10 ESSENTIAL HYPERTENSION: ICD-10-CM

## 2019-03-01 DIAGNOSIS — E78.5 HYPERLIPIDEMIA, UNSPECIFIED HYPERLIPIDEMIA TYPE: ICD-10-CM

## 2019-03-02 RX ORDER — AMLODIPINE BESYLATE 5 MG/1
TABLET ORAL
Qty: 90 TABLET | Refills: 3 | Status: SHIPPED | OUTPATIENT
Start: 2019-03-02 | End: 2019-03-03 | Stop reason: SDUPTHER

## 2019-03-02 RX ORDER — ROSUVASTATIN CALCIUM 5 MG/1
TABLET, COATED ORAL
Qty: 90 TABLET | Refills: 3 | Status: SHIPPED | OUTPATIENT
Start: 2019-03-02 | End: 2019-04-08 | Stop reason: SDUPTHER

## 2019-03-02 RX ORDER — MONTELUKAST SODIUM 10 MG/1
TABLET ORAL
Qty: 90 TABLET | Refills: 3 | Status: SHIPPED | OUTPATIENT
Start: 2019-03-02 | End: 2019-04-08 | Stop reason: SDUPTHER

## 2019-03-03 DIAGNOSIS — I10 ESSENTIAL HYPERTENSION: ICD-10-CM

## 2019-03-03 RX ORDER — AMLODIPINE BESYLATE 5 MG/1
5 TABLET ORAL 2 TIMES DAILY
Qty: 180 TABLET | Refills: 3 | Status: SHIPPED | OUTPATIENT
Start: 2019-03-03 | End: 2020-02-10

## 2019-03-22 ENCOUNTER — OFFICE VISIT (OUTPATIENT)
Dept: FAMILY MEDICINE CLINIC | Facility: CLINIC | Age: 74
End: 2019-03-22
Payer: MEDICARE

## 2019-03-22 VITALS
HEART RATE: 100 BPM | HEIGHT: 64 IN | WEIGHT: 201 LBS | DIASTOLIC BLOOD PRESSURE: 72 MMHG | TEMPERATURE: 98.3 F | SYSTOLIC BLOOD PRESSURE: 118 MMHG | RESPIRATION RATE: 18 BRPM | BODY MASS INDEX: 34.31 KG/M2

## 2019-03-22 DIAGNOSIS — I10 BENIGN ESSENTIAL HYPERTENSION: ICD-10-CM

## 2019-03-22 DIAGNOSIS — G24.3 TORTICOLLIS, SPASMODIC: Primary | ICD-10-CM

## 2019-03-22 PROCEDURE — 99213 OFFICE O/P EST LOW 20 MIN: CPT | Performed by: FAMILY MEDICINE

## 2019-03-22 RX ORDER — METHYLPREDNISOLONE 4 MG/1
TABLET ORAL
Qty: 21 EACH | Refills: 0 | Status: SHIPPED | OUTPATIENT
Start: 2019-03-22 | End: 2019-05-15

## 2019-03-22 RX ORDER — CYCLOBENZAPRINE HCL 10 MG
10 TABLET ORAL 3 TIMES DAILY PRN
Qty: 30 TABLET | Refills: 0 | Status: SHIPPED | OUTPATIENT
Start: 2019-03-22 | End: 2019-05-15

## 2019-03-22 RX ORDER — LOSARTAN POTASSIUM 50 MG/1
25 TABLET ORAL DAILY
COMMUNITY
End: 2020-06-18 | Stop reason: SDUPTHER

## 2019-03-22 NOTE — PROGRESS NOTES
Assessment  1  Asthma (493 90) (J45 909)   2  Acute bronchitis (466 0) (J20 9)    Plan  Asthma    · AirDuo RespiClick 070/55 103-18 MCG/ACT Inhalation Aerosol Powder BreathActivated; one inh bid  PMH: Asthmatic bronchitis    · Azithromycin 250 MG Oral Tablet (Zithromax Z-Peter); TAKE 2 TABLETS ON DAY1 THEN TAKE 1 TABLET A DAY FOR 4 DAYS    Discussion/Summary  Possible side effects of new medications were reviewed with the patient/guardian today  The treatment plan was reviewed with the patient/guardian  The patient/guardian understands and agrees with the treatment plan      Chief Complaint  pt complains of head cold and asthma acting up  a few days ago pt was achy and chills tc/cma      History of Present Illness  Cold Symptoms: Rg Kang presents with complaints of cold symptoms  Associated symptoms include nasal congestion,-- runny nose,-- post nasal drainage,-- scratchy throat,-- hoarseness,-- dry cough,-- facial pressure,-- plugged ear(s),-- wheezing-- and-- fatigue, but-- no fever  Active Problems  1  Acute bronchitis (466 0) (J20 9)   2  Asthma (493 90) (J45 909)   3  Benign essential hypertension (401 1) (I10)   4  Borderline hyperlipidemia (272 4) (E78 5)   5  Depression with anxiety (300 4) (F41 8)   6  DM type 2 with diabetic mixed hyperlipidemia (250 80,272  2) (E11 69,E78 2)   7  Dysuria (788 1) (R30 0)   8  Eczema (692 9) (L30 9)   9  Elevated ALT measurement (790 4) (R74 0)   10  Encounter for other screening for malignant neoplasm of breast (V76 19) (Z12 39)   11  Encounter for screening colonoscopy (V76 51) (Z12 11)   12  Encounter for screening mammogram for malignant neoplasm of breast (V76 12)  (Z12 31)   13  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)   14  Gastroesophageal reflux disease with esophagitis (530 11) (K21 0)   15  Hair loss (704 00) (L65 9)   16  Hyperlipidemia (272 4) (E78 5)   17  Medicare annual wellness visit, subsequent (V70 0) (Z00 00)   18   Mitral valve disorder (424 0) (I05 9)   19  Nonalcoholic fatty liver disease (571 8) (K76 0)   20  Oral thrush (112 0) (B37 0)   21  Osteoporosis (733 00) (M81 0)   22  Pulmonary nodule seen on imaging study (793 11) (R91 1)   23  Risk of exposure to Lyme disease (V15 89) (Z91 89)   24  Severe persistent asthma with acute exacerbation (493 92) (J45 51)   25  Suspected sleep apnea (780 57) (G47 30)   26  Type 2 diabetes mellitus (250 00) (E11 9)   27  Vitamin D deficiency (268 9) (E55 9)    Social History   · Being A Social Drinker   · Daily alcohol use   · Former smoker (V15 82) (Z87 891)   · Pt smoked 1 ppd for 20 yrs  Pt quit 20 yrs ago  · Marital History - Currently    ·    · Occupation   · retired worked as   · Denied: History of Pets / animals   · Denied: History of Second hand smoke exposure   · Denied: History of Travel history    Current Meds   1  Albuterol Sulfate (2 5 MG/3ML) 0 083% Inhalation Nebulization Solution; USE 1 VIAL VIA NEBULIZER FOUR TIMES DAILY AS NEEDED; Therapy: 90VCA0456 to (Evaluate:21Jun2017)  Requested for: 46PBP5775; Last Rx:10May2017 Ordered   2  ALPRAZolam 0 5 MG Oral Tablet; ONE TAB PO BID PRN; Therapy: 99APR2164 to (Last Rx:21Jun2017) Ordered   3  AmLODIPine Besylate 5 MG Oral Tablet; One tab po qd; Therapy: 98Zml1852 to (Last Rx:10May2017)  Requested for: 31TYX1780 Ordered   4  Betamethasone Dipropionate 0 05 % External Cream; APPLY SPARINGLY TO AFFECTED AREA(S) TWICE DAILY; Therapy: 46AAJ2210 to (Last Rx:10May2017)  Requested for: 50AQN7421 Ordered   5  Biotin 5000 MCG Oral Capsule; TAKE 2 CAPSULE Daily; Therapy: (Recorded:04Nov2016) to Recorded   6  Breo Ellipta 100-25 MCG/INH Inhalation Aerosol Powder Breath Activated; INHALE 1 PUFFS Daily; Therapy: (Recorded:38Xjy3581) to Recorded   7  Caltrate 600+D 600-400 MG-UNIT TABS; 1 Two Times A Day; Therapy: 72GUD4846 to  Requested for: 74FUM9394 Recorded   8   Clotrimazole-Betamethasone 1-0 05 % External Cream; APPLY SPARINGLY TO THE AFFECTED AREA(S) TWICE DAILY; Therapy: 60YTX7352 to (Evaluate:53Eyb1672)  Requested for: 33HIQ9904; Last Rx:10May2017 Ordered   9  Magnesium 250 MG Oral Tablet; Therapy: (Recorded:91Kak4233) to Recorded   10  Montelukast Sodium 10 MG Oral Tablet; 1 EVERY MORNING; Therapy: 02Apr2012 to (Last Rx:10May2017)  Requested for: 00JHL3304 Ordered   11  Nitrofurantoin Monohyd Macro 100 MG Oral Capsule; one bid x 7d; Therapy: 09ZHL2101 to (Last Loyde Many)  Requested for: 70LEG6037 Ordered   12  Omeprazole 40 MG Oral Capsule Delayed Release; one cap po qd; Last Rx:10May2017  Ordered   13  PredniSONE 10 MG Oral Tablet; 4 tabs po d1, 3 tabs po d2, 2 tabs po d3, 1 tab po  d4, 1/2 tab po d5&6;  Therapy: 32FCD7784 to (Last Rx:03Nov2017)  Requested for: 13DUO7701 Ordered   14  Simvastatin 20 MG Oral Tablet; One tab po qd; Therapy: 02Apr2012 to (Last Rx:10May2017)  Requested for: 03WFA3460 Ordered   15  Vitamin B Complex Oral Tablet; 1 Every Day; Therapy: 92SCH1386 to  Requested for: 30SMS0796 Recorded   16  Vitamin D-1000 [address] 1000 UNIT Oral Tablet; 2 Every Morning; Therapy: 54IPX5851 to  Requested for: 61GXC2241 Recorded   17  Zyrtec 10 MG TABS; one qd; Therapy: 26Bva0353 to  Requested for: 70BVK3119 Recorded    Allergies  1  ASPIRIN   2  CIPRO   3  Cimetidine TABS  4  Eggs   5  Hazelnuts   6  Nuts    Vitals   Recorded: 02Ina1072 09:52AM   Temperature 97 9 F, Temporal   Heart Rate 82, L Radial   Pulse Quality Normal, L Radial   Respiration Quality Normal   Respiration 16   Systolic 984, LUE, Sitting   Diastolic 72, LUE, Sitting   Height 5 ft 4 in   Weight 186 lb    BMI Calculated 31 93   BSA Calculated 1 9   O2 Saturation 97     Physical Exam   Constitutional  General appearance: No acute distress, well appearing and well nourished  acutely ill,-- overweight-- and-- appears tired  Ears, Nose, Mouth, and Throat  Nasal mucosa, septum, and turbinates: Abnormal   The bilateral nasal mucosa was boggy  -- b/l facial tenderness  Oropharynx: Abnormal   The posterior pharynx was erythematous-- and-- pngtt, but-- did not have an exudate  Pulmonary  Respiratory effort: No increased work of breathing or signs of respiratory distress  Auscultation of lungs: Abnormal  -- scattered exp wheezes  Cardiovascular RRR          Signatures   Electronically signed by : RONNY Barone ; Dec 17 2017  8:02PM EST                       (Author) Dr. Somers: I have personally seen and examined this patient at the bedside. I have fully participated in the care of this patient. I have reviewed all pertinent clinical information, including history, physical exam, plan and the Resident's note and agree except as noted. HPI above as by me. PE above as by me. DDX uri, cap, appy, pyelo PLAN rvp, lab, urine, ct DISPO reassess after deferevesnce, possible home

## 2019-04-08 DIAGNOSIS — E78.5 HYPERLIPIDEMIA, UNSPECIFIED HYPERLIPIDEMIA TYPE: ICD-10-CM

## 2019-04-08 DIAGNOSIS — J45.909 MODERATE ASTHMA WITHOUT COMPLICATION, UNSPECIFIED WHETHER PERSISTENT: ICD-10-CM

## 2019-04-08 RX ORDER — MONTELUKAST SODIUM 10 MG/1
10 TABLET ORAL
Qty: 90 TABLET | Refills: 3 | Status: SHIPPED | OUTPATIENT
Start: 2019-04-08 | End: 2020-03-13

## 2019-04-08 RX ORDER — ROSUVASTATIN CALCIUM 5 MG/1
5 TABLET, COATED ORAL DAILY
Qty: 90 TABLET | Refills: 3 | Status: SHIPPED | OUTPATIENT
Start: 2019-04-08 | End: 2020-03-13

## 2019-05-15 ENCOUNTER — OFFICE VISIT (OUTPATIENT)
Dept: FAMILY MEDICINE CLINIC | Facility: CLINIC | Age: 74
End: 2019-05-15
Payer: MEDICARE

## 2019-05-15 ENCOUNTER — TELEPHONE (OUTPATIENT)
Dept: FAMILY MEDICINE CLINIC | Facility: CLINIC | Age: 74
End: 2019-05-15

## 2019-05-15 VITALS
WEIGHT: 201 LBS | BODY MASS INDEX: 34.31 KG/M2 | TEMPERATURE: 99.2 F | HEIGHT: 64 IN | DIASTOLIC BLOOD PRESSURE: 70 MMHG | RESPIRATION RATE: 18 BRPM | HEART RATE: 92 BPM | SYSTOLIC BLOOD PRESSURE: 144 MMHG

## 2019-05-15 DIAGNOSIS — J45.41 MODERATE PERSISTENT ASTHMA WITH ACUTE EXACERBATION: Primary | ICD-10-CM

## 2019-05-15 PROCEDURE — 99214 OFFICE O/P EST MOD 30 MIN: CPT | Performed by: FAMILY MEDICINE

## 2019-05-15 RX ORDER — PREDNISONE 20 MG/1
40 TABLET ORAL DAILY
Qty: 14 TABLET | Refills: 0 | Status: SHIPPED | OUTPATIENT
Start: 2019-05-15 | End: 2019-05-22

## 2019-05-20 ENCOUNTER — TELEPHONE (OUTPATIENT)
Dept: FAMILY MEDICINE CLINIC | Facility: CLINIC | Age: 74
End: 2019-05-20

## 2019-05-20 DIAGNOSIS — R05.9 COUGH: Primary | ICD-10-CM

## 2019-05-20 RX ORDER — AZITHROMYCIN 250 MG/1
TABLET, FILM COATED ORAL
Qty: 6 TABLET | Refills: 0 | Status: SHIPPED | OUTPATIENT
Start: 2019-05-20 | End: 2019-05-24

## 2019-05-28 ENCOUNTER — OFFICE VISIT (OUTPATIENT)
Dept: FAMILY MEDICINE CLINIC | Facility: CLINIC | Age: 74
End: 2019-05-28
Payer: MEDICARE

## 2019-05-28 VITALS
SYSTOLIC BLOOD PRESSURE: 130 MMHG | RESPIRATION RATE: 20 BRPM | HEIGHT: 64 IN | BODY MASS INDEX: 34.49 KG/M2 | OXYGEN SATURATION: 95 % | TEMPERATURE: 99.2 F | WEIGHT: 202 LBS | DIASTOLIC BLOOD PRESSURE: 70 MMHG | HEART RATE: 92 BPM

## 2019-05-28 DIAGNOSIS — J44.1 COPD EXACERBATION (HCC): Primary | ICD-10-CM

## 2019-05-28 PROCEDURE — 99214 OFFICE O/P EST MOD 30 MIN: CPT | Performed by: FAMILY MEDICINE

## 2019-05-28 RX ORDER — DOXYCYCLINE HYCLATE 100 MG/1
100 CAPSULE ORAL EVERY 12 HOURS SCHEDULED
Qty: 20 CAPSULE | Refills: 0 | Status: SHIPPED | OUTPATIENT
Start: 2019-05-28 | End: 2019-06-07

## 2019-05-30 LAB
LEFT EYE DIABETIC RETINOPATHY: NORMAL
RIGHT EYE DIABETIC RETINOPATHY: NORMAL
SEVERITY (EYE EXAM): NORMAL

## 2019-07-02 DIAGNOSIS — J45.909 UNCOMPLICATED ASTHMA, UNSPECIFIED ASTHMA SEVERITY, UNSPECIFIED WHETHER PERSISTENT: ICD-10-CM

## 2019-07-17 ENCOUNTER — OFFICE VISIT (OUTPATIENT)
Dept: FAMILY MEDICINE CLINIC | Facility: CLINIC | Age: 74
End: 2019-07-17
Payer: MEDICARE

## 2019-07-17 VITALS
TEMPERATURE: 98.1 F | BODY MASS INDEX: 33.12 KG/M2 | DIASTOLIC BLOOD PRESSURE: 66 MMHG | HEIGHT: 64 IN | WEIGHT: 194 LBS | RESPIRATION RATE: 18 BRPM | HEART RATE: 88 BPM | OXYGEN SATURATION: 95 % | SYSTOLIC BLOOD PRESSURE: 110 MMHG

## 2019-07-17 DIAGNOSIS — R05.9 COUGH: ICD-10-CM

## 2019-07-17 DIAGNOSIS — J41.0 SIMPLE CHRONIC BRONCHITIS (HCC): Primary | ICD-10-CM

## 2019-07-17 PROCEDURE — 99214 OFFICE O/P EST MOD 30 MIN: CPT | Performed by: FAMILY MEDICINE

## 2019-07-17 RX ORDER — AZITHROMYCIN 250 MG/1
TABLET, FILM COATED ORAL
Qty: 6 TABLET | Refills: 0 | Status: SHIPPED | OUTPATIENT
Start: 2019-07-17 | End: 2019-07-21

## 2019-07-17 RX ORDER — PROMETHAZINE HYDROCHLORIDE AND CODEINE PHOSPHATE 6.25; 1 MG/5ML; MG/5ML
5 SYRUP ORAL EVERY 4 HOURS PRN
Qty: 120 ML | Refills: 0 | Status: SHIPPED | OUTPATIENT
Start: 2019-07-17 | End: 2019-10-14 | Stop reason: SDUPTHER

## 2019-07-17 NOTE — PROGRESS NOTES
Chief Complaint   Patient presents with    Asthma        Patient ID: Myra Álvarez is a 69 y o  female  HPI  Pt with Asthma/COPD, former smoker seeing for cough x 1 wk -  Has frequent exacerbations -  Last episode 2 m ago -  Was Tx with Doxy -  Did not stop Advair and Singulair -  Was seein gretchen allergist and pulmonologist in the past -  No additional Tx was advised by specialists     The following portions of the patient's history were reviewed and updated as appropriate: allergies, current medications, past family history, past medical history, past social history, past surgical history and problem list     Review of Systems   Constitutional: Negative  HENT: Negative  Respiratory: Positive for cough  Negative for chest tightness, shortness of breath and wheezing  Cardiovascular: Negative  Gastrointestinal: Negative  Genitourinary: Negative  Neurological: Negative          Current Outpatient Medications   Medication Sig Dispense Refill    ADVAIR DISKUS 500-50 MCG/DOSE inhaler Inhale 1 puff 2 (two) times a day 1 Inhaler 3    ADVAIR DISKUS 500-50 MCG/DOSE inhaler INHALE 1 PUFF 2 TIMES A  each 5    albuterol (2 5 mg/3 mL) 0 083 % nebulizer solution Use 1 vial via nebulizer every 4 hours as needed for wheeze 1125 mL 3    amLODIPine (NORVASC) 5 mg tablet Take 1 tablet (5 mg total) by mouth 2 (two) times a day 180 tablet 3    Biotin 5000 MCG CAPS Take 2 capsules by mouth daily      CALCIUM PO Take 600 mg by mouth      cetirizine (ZyrTEC) 10 mg tablet       chlorthalidone 25 mg tablet Take 25 mg by mouth daily      Cholecalciferol 1000 UNIT/10ML LIQD Take 1 g by mouth daily      fluticasone-salmeterol (ADVAIR DISKUS) 500-50 mcg/dose inhaler Inhale 1 puff 2 (two) times a day      ipratropium-albuterol (DUO-NEB) 0 5-2 5 mg/3 mL nebulizer solution Take 1 vial (3 mL total) by nebulization every 6 (six) hours as needed for wheezing or shortness of breath 25 vial 0    losartan (COZAAR) 50 mg tablet Take 25 mg by mouth daily      Magnesium 400 MG TABS Take by mouth      montelukast (SINGULAIR) 10 mg tablet Take 1 tablet (10 mg total) by mouth daily at bedtime 90 tablet 3    omeprazole (PriLOSEC) 40 MG capsule Take by mouth      rosuvastatin (CRESTOR) 5 mg tablet Take 1 tablet (5 mg total) by mouth daily 90 tablet 3     No current facility-administered medications for this visit  Objective:    /66 (BP Location: Right arm, Patient Position: Sitting, Cuff Size: Large)   Pulse 88   Temp 98 1 °F (36 7 °C) (Tympanic)   Resp 18   Ht 5' 4" (1 626 m)   Wt 88 kg (194 lb)   SpO2 95%   BMI 33 30 kg/m²        Physical Exam   Constitutional: No distress  Obese    Cardiovascular: Normal rate and regular rhythm  Pulmonary/Chest: Effort normal  No respiratory distress  She has decreased breath sounds  She has no wheezes  She has no rales  Few wheezing after coughing    Musculoskeletal: She exhibits no edema  Assessment/Plan:         Diagnoses and all orders for this visit:    Simple chronic bronchitis (HCC)  -     azithromycin (ZITHROMAX) 250 mg tablet; Take 2 tablets today then 1 tablet daily x 4 days    Cough  -     promethazine-codeine (PHENERGAN WITH CODEINE) 6 25-10 mg/5 mL syrup; Take 5 mL by mouth every 4 (four) hours as needed for cough  -     azithromycin (ZITHROMAX) 250 mg tablet;  Take 2 tablets today then 1 tablet daily x 4 days            rto prn               Vu Lara MD

## 2019-07-24 DIAGNOSIS — J45.909 MODERATE ASTHMA, UNSPECIFIED WHETHER COMPLICATED, UNSPECIFIED WHETHER PERSISTENT: ICD-10-CM

## 2019-07-24 RX ORDER — ALBUTEROL SULFATE 2.5 MG/3ML
SOLUTION RESPIRATORY (INHALATION)
Qty: 1 ML | Refills: 8 | Status: SHIPPED | OUTPATIENT
Start: 2019-07-24 | End: 2019-07-31 | Stop reason: SDUPTHER

## 2019-07-31 ENCOUNTER — OFFICE VISIT (OUTPATIENT)
Dept: FAMILY MEDICINE CLINIC | Facility: CLINIC | Age: 74
End: 2019-07-31
Payer: MEDICARE

## 2019-07-31 VITALS
HEIGHT: 64 IN | HEART RATE: 82 BPM | SYSTOLIC BLOOD PRESSURE: 142 MMHG | DIASTOLIC BLOOD PRESSURE: 68 MMHG | BODY MASS INDEX: 32.88 KG/M2 | TEMPERATURE: 99 F | WEIGHT: 192.6 LBS | RESPIRATION RATE: 20 BRPM

## 2019-07-31 DIAGNOSIS — J45.909 MODERATE ASTHMA, UNSPECIFIED WHETHER COMPLICATED, UNSPECIFIED WHETHER PERSISTENT: Primary | ICD-10-CM

## 2019-07-31 DIAGNOSIS — R05.9 COUGH: ICD-10-CM

## 2019-07-31 DIAGNOSIS — Z86.39 HISTORY OF DIABETES MELLITUS: ICD-10-CM

## 2019-07-31 LAB
SL AMB  POCT GLUCOSE, UA: NORMAL
SL AMB LEUKOCYTE ESTERASE,UA: NORMAL
SL AMB POCT BILIRUBIN,UA: NORMAL
SL AMB POCT BLOOD,UA: NORMAL
SL AMB POCT CLARITY,UA: CLEAR
SL AMB POCT COLOR,UA: YELLOW
SL AMB POCT HEMOGLOBIN AIC: 5.4 (ref ?–6.5)
SL AMB POCT KETONES,UA: NORMAL
SL AMB POCT NITRITE,UA: NORMAL
SL AMB POCT PH,UA: 5
SL AMB POCT SPECIFIC GRAVITY,UA: 1.02
SL AMB POCT URINE PROTEIN: NORMAL
SL AMB POCT UROBILINOGEN: NORMAL

## 2019-07-31 PROCEDURE — 81003 URINALYSIS AUTO W/O SCOPE: CPT | Performed by: FAMILY MEDICINE

## 2019-07-31 PROCEDURE — 83036 HEMOGLOBIN GLYCOSYLATED A1C: CPT | Performed by: FAMILY MEDICINE

## 2019-07-31 PROCEDURE — 99213 OFFICE O/P EST LOW 20 MIN: CPT | Performed by: FAMILY MEDICINE

## 2019-07-31 RX ORDER — AMOXICILLIN AND CLAVULANATE POTASSIUM 875; 125 MG/1; MG/1
1 TABLET, FILM COATED ORAL EVERY 12 HOURS SCHEDULED
Qty: 14 TABLET | Refills: 0 | Status: SHIPPED | OUTPATIENT
Start: 2019-07-31 | End: 2019-08-07

## 2019-07-31 RX ORDER — ALBUTEROL SULFATE 2.5 MG/3ML
SOLUTION RESPIRATORY (INHALATION)
Qty: 15 ML | Refills: 8 | Status: SHIPPED | OUTPATIENT
Start: 2019-07-31 | End: 2019-08-20 | Stop reason: SDUPTHER

## 2019-08-01 LAB
ALBUMIN/CREAT UR: 4.1 MG/G CREAT (ref 0–30)
CREAT UR-MCNC: 231.8 MG/DL
MICROALBUMIN UR-MCNC: 9.5 UG/ML

## 2019-08-02 NOTE — PROGRESS NOTES
Chief Complaint   Patient presents with    Asthma     flare up     Cough    Diabetes        Patient ID: Kraen Al is a 69 y o  female  68-year-old patient in with complaint of persisting cough and wheezing  Low-grade fever noted  No GI symptoms  No recent travel  Patient was seen by another provider in practice while I was on leave and treated with Z-Peter and steroids followed by a course of doxycycline  and then additional Z-Peter due to persistent symptoms  The patient reports some relief with treatments given but not complete resolution of symptoms  Has seen pulmonologist in past for similar problems-is requesting 2nd opinion  Also due for check on hemoglobin [id number] of steroid induced diabetes-hemoglobin A1c equal to 5 4% in office today          Past Medical History:   Diagnosis Date    Allergic rhinitis 08/25/2008    Arthritis     LAST ASSESSED: 27LAG8008    Asthma     Asthma with acute exacerbation 06/24/2009    Bilateral renal cysts     RESOLVED: 16DHN4446    Eczema     LAST ASSESSED: 46BVV1035    Hiatal hernia     LAST ASSESSED: 40TJY3733    Hirsutism 09/26/2011    Pleurisy     LAST ASSESSED: 10QKH4022    Pneumonia     LAST ASSESSED: 52KOO7084    Umbilical hernia 04/99/7755       Past Surgical History:   Procedure Laterality Date    APPENDECTOMY      LAST ASSESSED: 17CFC0735    CYSTECTOMY      OVARIAN    INCISIONAL BREAST BIOPSY      TONSILLECTOMY         Patient Active Problem List   Diagnosis    DM type 2 with diabetic mixed hyperlipidemia ([address] )    Depression with anxiety    Borderline hyperlipidemia    Benign essential hypertension    Asthma    Gastroesophageal reflux disease with esophagitis    Hyperlipidemia    Osteoporosis    Nonalcoholic fatty liver disease    Pulmonary nodule seen on imaging study    Suspected sleep apnea    Infected abrasion of right hand    Anterior scleritis    Senile cataract    Simple chronic bronchitis (HCC)    Dizziness    Hypertension    Obesity    Sedentary lifestyle       Family History   Problem Relation Age of Onset    Cancer Mother     Hypertension Mother         BENIGN ESSENTIAL HYPERTENSION WITH TARGET BLOOD PRESSURE BELOW 140/90    Ovarian cancer Mother     Hypertension Father         BENIGN ESSENTIAL HYPERTENSION WITH TARGET BLOOD PRESSURE BELOW 140/90       Immunization History   Administered Date(s) Administered    Pneumococcal Conjugate 13-Valent 01/06/2016    Pneumococcal Polysaccharide PPV23 05/22/2013    Tdap 12/14/2012       Allergies   Allergen Reactions    Aspirin      Stomach bleeding  Stomach bleeding  Stomach bleeding    Cimetidine      Reaction Date: 25Aug2008;     Ciprofloxacin      Acid reflux  Acid reflux  Reaction Date: 22Dec2011; Annotation - [id number]: pt had cramping  tc/cma       Current Outpatient Medications   Medication Sig Dispense Refill    ADVAIR DISKUS 500-50 MCG/DOSE inhaler Inhale 1 puff 2 (two) times a day 1 Inhaler 3    albuterol (2 5 mg/3 mL) 0 083 % nebulizer solution USE 1 VIAL IN NEBULIZER EVERY 4 HOURS AS NEEDED FOR WHEEZING 15 mL 8    amLODIPine (NORVASC) 5 mg tablet Take 1 tablet (5 mg total) by mouth 2 (two) times a day 180 tablet 3    Biotin 5000 MCG CAPS Take 2 capsules by mouth daily      CALCIUM PO Take 600 mg by mouth      cetirizine (ZyrTEC) 10 mg tablet       chlorthalidone 25 mg tablet Take 25 mg by mouth daily      Cholecalciferol 1000 UNIT/10ML LIQD Take 1 g by mouth daily      fluticasone-salmeterol (ADVAIR DISKUS) 500-50 mcg/dose inhaler Inhale 1 puff 2 (two) times a day      ipratropium-albuterol (DUO-NEB) 0 5-2 5 mg/3 mL nebulizer solution Take 1 vial (3 mL total) by nebulization every 6 (six) hours as needed for wheezing or shortness of breath 25 vial 0    losartan (COZAAR) 50 mg tablet Take 25 mg by mouth daily      Magnesium 400 MG TABS Take by mouth      montelukast (SINGULAIR) 10 mg tablet Take 1 tablet (10 mg total) by mouth daily at bedtime 90 tablet 3    omeprazole (PriLOSEC) 40 MG capsule Take by mouth      rosuvastatin (CRESTOR) 5 mg tablet Take 1 tablet (5 mg total) by mouth daily 90 tablet 3    amoxicillin-clavulanate (AUGMENTIN) 875-125 mg per tablet Take 1 tablet by mouth every 12 (twelve) hours for 7 days 14 tablet 0    promethazine-codeine (PHENERGAN WITH CODEINE) 6 25-10 mg/5 mL syrup Take 5 mL by mouth every 4 (four) hours as needed for cough (Patient not taking: Reported on 7/31/2019) 120 mL 0     No current facility-administered medications for this visit          Social History     Socioeconomic History    Marital status: /Civil Union     Spouse name: None    Number of children: None    Years of education: None    Highest education level: None   Occupational History    Occupation: RETIRED WORKED AS    Social Needs    Financial resource strain: None    Food insecurity:     Worry: None     Inability: None    Transportation needs:     Medical: None     Non-medical: None   Tobacco Use    Smoking status: Former Smoker     Packs/day: 1 00     Years: 20 00     Pack years: 20 00    Smokeless tobacco: Never Used    Tobacco comment: QUIT AT THE AGE OF 50, DENIED: HISTORY OF SECOND HAND SMOKE EXPOSURE   Substance and Sexual Activity    Alcohol use: Yes     Comment: SOCIAL, DAILY    Drug use: No    Sexual activity: None   Lifestyle    Physical activity:     Days per week: None     Minutes per session: None    Stress: None   Relationships    Social connections:     Talks on phone: None     Gets together: None     Attends Scientologist service: None     Active member of club or organization: None     Attends meetings of clubs or organizations: None     Relationship status: None    Intimate partner violence:     Fear of current or ex partner: None     Emotionally abused: None     Physically abused: None     Forced sexual activity: None   Other Topics Concern    None   Social History Narrative    DENIED: HISTORY OF PETS/ANIMALS    DENIED: HISTORY OF TRAVEL HISTORY       Review of Systems   Constitutional: Positive for fatigue and fever  HENT: Positive for postnasal drip  Respiratory: Positive for cough and wheezing  Cardiovascular: Negative  Gastrointestinal:        GERD   Musculoskeletal: Positive for arthralgias  Psychiatric/Behavioral: The patient is nervous/anxious  Objective:    /68 (BP Location: Left arm, Patient Position: Sitting, Cuff Size: Standard)   Pulse 82   Temp 99 °F (37 2 °C)   Resp 20   Ht 5' 4" (1 626 m)   Wt 87 4 kg (192 lb 9 6 oz)   BMI 33 06 kg/m²        Physical Exam   Constitutional: She is oriented to person, place, and time  Looks tired   HENT:   pngtt   Eyes: Conjunctivae are normal  No scleral icterus  Neck: Neck supple  Cardiovascular: Normal rate, regular rhythm and intact distal pulses  Pulmonary/Chest: Effort normal  No respiratory distress  She has wheezes  Abdominal: Soft  Bowel sounds are normal  There is no tenderness  Neurological: She is alert and oriented to person, place, and time  No cranial nerve deficit  Skin: Skin is warm and dry  No rash noted  Psychiatric: She has a normal mood and affect  Vitals reviewed  Diabetic Foot Exam          Assessment/Plan:     Diagnoses and all orders for this visit:    DM type 2 with diabetic mixed hyperlipidemia ([facility] [address] )  -     Microalbumin / creatinine urine ratio  -     HEMOGLOBIN A1C W/ EAG ESTIMATION; Future  -     POCT hemoglobin A1c  -     POCT urine dip auto non-scope    Chronic bronchitis, unspecified chronic bronchitis type (HCC)  -     XR chest pa & lateral; Future  -     amoxicillin-clavulanate (AUGMENTIN) 875-125 mg per tablet; Take 1 tablet by mouth every 12 (twelve) hours for 7 days    Moderate asthma, unspecified whether complicated, unspecified whether persistent  Comments:  refilled albuterol vials for neb/cont inh use prn  ans singulair daily    Orders:  -     albuterol (2 5 mg/3 mL) 0  083 % nebulizer solution; USE 1 VIAL IN NEBULIZER EVERY 4 HOURS AS NEEDED FOR WHEEZING          Joseph Rick MD

## 2019-08-08 ENCOUNTER — TELEPHONE (OUTPATIENT)
Dept: FAMILY MEDICINE CLINIC | Facility: CLINIC | Age: 74
End: 2019-08-08

## 2019-08-08 NOTE — TELEPHONE ENCOUNTER
Dr Mukund Lantigua:    Patient was in last week and spoke with you in reference to her xray results  And she wanted to know if you felt it was best for her to get a dexascan? If so, can you write her an order?

## 2019-08-10 DIAGNOSIS — M81.0 AGE RELATED OSTEOPOROSIS, UNSPECIFIED PATHOLOGICAL FRACTURE PRESENCE: Primary | ICD-10-CM

## 2019-08-10 NOTE — TELEPHONE ENCOUNTER
Last DEXA  Scan was 2016, so yes we can recheck  I put order in chart-please inform pt and mail to her-thanks

## 2019-08-19 ENCOUNTER — TELEPHONE (OUTPATIENT)
Dept: FAMILY MEDICINE CLINIC | Facility: CLINIC | Age: 74
End: 2019-08-19

## 2019-08-19 NOTE — TELEPHONE ENCOUNTER
Dr Bernard Stoll called from [facility] to say albuterol 15 ml was prescribed, but patient says she takes 3 boxes 75 ml  Please advise

## 2019-08-20 ENCOUNTER — TELEPHONE (OUTPATIENT)
Dept: FAMILY MEDICINE CLINIC | Facility: CLINIC | Age: 74
End: 2019-08-20

## 2019-08-20 DIAGNOSIS — J45.909 MODERATE ASTHMA, UNSPECIFIED WHETHER COMPLICATED, UNSPECIFIED WHETHER PERSISTENT: ICD-10-CM

## 2019-08-20 RX ORDER — ALBUTEROL SULFATE 2.5 MG/3ML
SOLUTION RESPIRATORY (INHALATION)
Qty: 75 ML | Refills: 3 | Status: SHIPPED | OUTPATIENT
Start: 2019-08-20 | End: 2019-08-22 | Stop reason: SDUPTHER

## 2019-08-20 RX ORDER — ALBUTEROL SULFATE 2.5 MG/3ML
SOLUTION RESPIRATORY (INHALATION)
Qty: 75 ML | Refills: 3 | Status: SHIPPED | OUTPATIENT
Start: 2019-08-20 | End: 2019-08-20 | Stop reason: SDUPTHER

## 2019-08-20 NOTE — TELEPHONE ENCOUNTER
Dr Светлана Richardson,     Patient called and said that only 15 of her albuterol was called in and she needs 79  She is already about to run out and needs it called in for the 70, not the 15   TY

## 2019-08-22 DIAGNOSIS — J45.909 MODERATE ASTHMA, UNSPECIFIED WHETHER COMPLICATED, UNSPECIFIED WHETHER PERSISTENT: ICD-10-CM

## 2019-08-22 RX ORDER — ALBUTEROL SULFATE 2.5 MG/3ML
SOLUTION RESPIRATORY (INHALATION)
Qty: 75 ML | Refills: 3 | Status: SHIPPED | OUTPATIENT
Start: 2019-08-22 | End: 2019-09-23 | Stop reason: SDUPTHER

## 2019-08-23 DIAGNOSIS — M81.0 AGE RELATED OSTEOPOROSIS, UNSPECIFIED PATHOLOGICAL FRACTURE PRESENCE: ICD-10-CM

## 2019-09-05 ENCOUNTER — TELEPHONE (OUTPATIENT)
Dept: FAMILY MEDICINE CLINIC | Facility: CLINIC | Age: 74
End: 2019-09-05

## 2019-09-05 NOTE — TELEPHONE ENCOUNTER
Dr Kelby Carver,     Patient called and said that she had a Dexa scan 2 weeks ago and still has not gotten any notification about the results   Please call her back when you can to explain the results of the test, TY

## 2019-09-08 NOTE — TELEPHONE ENCOUNTER
Have been unable to get thru to pt (machine states unable to complete call)  Please try again and inform DEXA scan show osteopenia--recommend cont ca/vit d/ mag supplements and regular weight bearing exercise    Please also mail copy of report to pt  thanks

## 2019-09-09 NOTE — TELEPHONE ENCOUNTER
Pt notified and results mailed   Pt is already taking the 3 supplements   She wants to know if she is taking enough of each   meds in her chart please advise  tc/cma

## 2019-09-11 ENCOUNTER — OFFICE VISIT (OUTPATIENT)
Dept: FAMILY MEDICINE CLINIC | Facility: CLINIC | Age: 74
End: 2019-09-11
Payer: MEDICARE

## 2019-09-11 VITALS
TEMPERATURE: 97.8 F | HEIGHT: 64 IN | WEIGHT: 194.6 LBS | BODY MASS INDEX: 33.22 KG/M2 | SYSTOLIC BLOOD PRESSURE: 146 MMHG | DIASTOLIC BLOOD PRESSURE: 72 MMHG | HEART RATE: 88 BPM | RESPIRATION RATE: 18 BRPM

## 2019-09-11 DIAGNOSIS — N39.0 URINARY TRACT INFECTION WITHOUT HEMATURIA, SITE UNSPECIFIED: Primary | ICD-10-CM

## 2019-09-11 LAB
SL AMB  POCT GLUCOSE, UA: ABNORMAL
SL AMB LEUKOCYTE ESTERASE,UA: 25
SL AMB POCT BILIRUBIN,UA: ABNORMAL
SL AMB POCT BLOOD,UA: ABNORMAL
SL AMB POCT CLARITY,UA: CLEAR
SL AMB POCT COLOR,UA: YELLOW
SL AMB POCT KETONES,UA: 15
SL AMB POCT NITRITE,UA: ABNORMAL
SL AMB POCT PH,UA: 5
SL AMB POCT SPECIFIC GRAVITY,UA: 1010
SL AMB POCT URINE PROTEIN: ABNORMAL
SL AMB POCT UROBILINOGEN: ABNORMAL

## 2019-09-11 PROCEDURE — 99213 OFFICE O/P EST LOW 20 MIN: CPT | Performed by: NURSE PRACTITIONER

## 2019-09-11 PROCEDURE — 81003 URINALYSIS AUTO W/O SCOPE: CPT | Performed by: NURSE PRACTITIONER

## 2019-09-11 RX ORDER — NITROFURANTOIN 25; 75 MG/1; MG/1
100 CAPSULE ORAL 2 TIMES DAILY
Qty: 10 CAPSULE | Refills: 0 | Status: SHIPPED | OUTPATIENT
Start: 2019-09-11 | End: 2019-09-16

## 2019-09-11 NOTE — PROGRESS NOTES
Assessment:      Acute cystitis    The case discussed with patient using patient centered shared decision making  The patient was counseled regarding instructions for management,-- risk factor reductions,-- prognosis,-- impressions,-- risks and benefits of treatment options,-- importance of compliance with treatment  I have reviewed the instructions with the patient, answering all questions to her satisfaction  Plan:  Plan:      1  Medications: nitrofurantoin  2  Maintain adequate hydration  3  Follow up if symptoms not improving, and prn  Urine culture pending  Subjective:      Soha Reece is a 69 y o  female who complains of abnormal smelling urine and burning with urination for 4 days  Patient also complains of nothing else  Patient denies back pain, congestion, cough, fever, headache, rhinitis, sorethroat, stomach ache and vaginal discharge  Patient does have a history of recurrent UTI  Patient does not have a history of pyelonephritis  The following portions of the patient's history were reviewed and updated as appropriate: allergies, current medications, past family history, past medical history, past social history, past surgical history and problem list     Review of Systems  Pertinent items are noted in HPI  Objective:      /72 (BP Location: Left arm, Patient Position: Sitting, Cuff Size: Large)   Pulse 88   Temp 97 8 °F (36 6 °C)   Resp 18   Ht 5' 4" (1 626 m)   Wt 88 3 kg (194 lb 9 6 oz)   BMI 33 40 kg/m²   General: alert and oriented, in no acute distress   Abdomen: soft, non-tender, without masses or organomegaly     Back: CVA tenderness absent   : defer exam     Laboratory:   Urine dipstick shows 3+ for leukocyte esterase  Micro exam: not done

## 2019-09-12 NOTE — TELEPHONE ENCOUNTER
Pt is taking adequate amounts as she also gets additional in her diet  She should try to get regular weight bearing exercise like walking

## 2019-09-13 LAB
BACTERIA UR CULT: NORMAL
Lab: NORMAL

## 2019-09-23 ENCOUNTER — OFFICE VISIT (OUTPATIENT)
Dept: FAMILY MEDICINE CLINIC | Facility: CLINIC | Age: 74
End: 2019-09-23
Payer: MEDICARE

## 2019-09-23 VITALS
HEIGHT: 64 IN | WEIGHT: 195 LBS | SYSTOLIC BLOOD PRESSURE: 116 MMHG | TEMPERATURE: 98.6 F | RESPIRATION RATE: 16 BRPM | BODY MASS INDEX: 33.29 KG/M2 | OXYGEN SATURATION: 97 % | HEART RATE: 76 BPM | DIASTOLIC BLOOD PRESSURE: 60 MMHG

## 2019-09-23 DIAGNOSIS — J45.909 MODERATE ASTHMA, UNSPECIFIED WHETHER COMPLICATED, UNSPECIFIED WHETHER PERSISTENT: ICD-10-CM

## 2019-09-23 DIAGNOSIS — M85.89 OSTEOPENIA OF MULTIPLE SITES: Primary | ICD-10-CM

## 2019-09-23 DIAGNOSIS — Z12.39 BREAST SCREENING: ICD-10-CM

## 2019-09-23 DIAGNOSIS — K21.00 GASTROESOPHAGEAL REFLUX DISEASE WITH ESOPHAGITIS: ICD-10-CM

## 2019-09-23 PROCEDURE — 99214 OFFICE O/P EST MOD 30 MIN: CPT | Performed by: FAMILY MEDICINE

## 2019-09-23 RX ORDER — FAMOTIDINE 20 MG/1
20 TABLET, FILM COATED ORAL 2 TIMES DAILY
Qty: 60 TABLET | Refills: 0 | Status: SHIPPED | OUTPATIENT
Start: 2019-09-23 | End: 2020-01-06 | Stop reason: ALTCHOICE

## 2019-09-23 RX ORDER — ALBUTEROL SULFATE 2.5 MG/3ML
SOLUTION RESPIRATORY (INHALATION)
Qty: 75 VIAL | Refills: 3 | Status: SHIPPED | OUTPATIENT
Start: 2019-09-23 | End: 2020-03-10 | Stop reason: SDUPTHER

## 2019-10-14 ENCOUNTER — OFFICE VISIT (OUTPATIENT)
Dept: FAMILY MEDICINE CLINIC | Facility: CLINIC | Age: 74
End: 2019-10-14
Payer: MEDICARE

## 2019-10-14 VITALS
TEMPERATURE: 98.2 F | SYSTOLIC BLOOD PRESSURE: 124 MMHG | HEIGHT: 64 IN | WEIGHT: 193 LBS | OXYGEN SATURATION: 94 % | DIASTOLIC BLOOD PRESSURE: 70 MMHG | HEART RATE: 82 BPM | RESPIRATION RATE: 16 BRPM | BODY MASS INDEX: 32.95 KG/M2

## 2019-10-14 DIAGNOSIS — I10 BENIGN ESSENTIAL HYPERTENSION: ICD-10-CM

## 2019-10-14 DIAGNOSIS — R05.9 COUGH: Primary | ICD-10-CM

## 2019-10-14 DIAGNOSIS — L30.9 ECZEMA, UNSPECIFIED TYPE: ICD-10-CM

## 2019-10-14 DIAGNOSIS — J45.909 MODERATE ASTHMA, UNSPECIFIED WHETHER COMPLICATED, UNSPECIFIED WHETHER PERSISTENT: ICD-10-CM

## 2019-10-14 DIAGNOSIS — K21.00 GASTROESOPHAGEAL REFLUX DISEASE WITH ESOPHAGITIS: ICD-10-CM

## 2019-10-14 PROCEDURE — 99214 OFFICE O/P EST MOD 30 MIN: CPT | Performed by: FAMILY MEDICINE

## 2019-10-14 RX ORDER — PROMETHAZINE HYDROCHLORIDE AND CODEINE PHOSPHATE 6.25; 1 MG/5ML; MG/5ML
5 SYRUP ORAL EVERY 4 HOURS PRN
Qty: 120 ML | Refills: 0 | Status: SHIPPED | OUTPATIENT
Start: 2019-10-14 | End: 2020-06-18 | Stop reason: ALTCHOICE

## 2019-10-14 RX ORDER — BETAMETHASONE DIPROPIONATE 0.5 MG/G
CREAM TOPICAL 2 TIMES DAILY
Qty: 30 G | Refills: 5 | Status: SHIPPED | OUTPATIENT
Start: 2019-10-14

## 2019-10-14 RX ORDER — ALBUTEROL SULFATE 90 UG/1
2 AEROSOL, METERED RESPIRATORY (INHALATION) EVERY 4 HOURS PRN
Qty: 1 INHALER | Refills: 5 | Status: SHIPPED | OUTPATIENT
Start: 2019-10-14

## 2019-10-16 NOTE — PROGRESS NOTES
Chief Complaint   Patient presents with    Medication Refill     omeprazole    Results     dexa    Blood Pressure Check    Asthma        Patient ID: Leisa Anaya is a 69 y o  female  17-year-old patient in for follow-up  Recent DEXA scan-positive osteopenia multiple sites  Ongoing GERD  Denies hematemesis or melena  Continued problems with intermittent flares of asthma and chronic rhinitis    BP within normal   Requests order for mammogram       Past Medical History:   Diagnosis Date    Allergic rhinitis 08/25/2008    Arthritis     LAST ASSESSED: [id number]    Asthma     Asthma with acute exacerbation 06/24/2009    Bilateral renal cysts     RESOLVED: [id number]    Eczema     LAST ASSESSED: [id number]    Hiatal hernia     LAST ASSESSED: [id number]    Hirsutism 09/26/2011    Osteopenia of multiple sites 9/5/2012    Procedure/Onset: 05/22/2009    Pleurisy     LAST ASSESSED: [id number]    Pneumonia     LAST ASSESSED: [id number]    Umbilical hernia 62/64/2028       Past Surgical History:   Procedure Laterality Date    APPENDECTOMY      LAST ASSESSED: [id number]    CYSTECTOMY      OVARIAN    INCISIONAL BREAST BIOPSY      TONSILLECTOMY         Patient Active Problem List   Diagnosis    DM type 2 with diabetic mixed hyperlipidemia ([address] )    Depression with anxiety    Borderline hyperlipidemia    Benign essential hypertension    Asthma    Gastroesophageal reflux disease with esophagitis    Hyperlipidemia    Osteopenia of multiple sites    Nonalcoholic fatty liver disease    Pulmonary nodule seen on imaging study    Suspected sleep apnea    Infected abrasion of right hand    Anterior scleritis    Senile cataract    Simple chronic bronchitis (HCC)    Dizziness    Hypertension    Obesity    Sedentary lifestyle       Family History   Problem Relation Age of Onset    Cancer Mother     Hypertension Mother         BENIGN ESSENTIAL HYPERTENSION WITH TARGET BLOOD PRESSURE BELOW 140/90    Ovarian cancer Mother     Hypertension Father         BENIGN ESSENTIAL HYPERTENSION WITH TARGET BLOOD PRESSURE BELOW 140/90       Immunization History   Administered Date(s) Administered    Pneumococcal Conjugate 13-Valent 01/06/2016    Pneumococcal Polysaccharide PPV23 05/22/2013    Tdap 12/14/2012       Allergies   Allergen Reactions    Aspirin      Stomach bleeding  Stomach bleeding  Stomach bleeding    Ciprofloxacin      Acid reflux  Acid reflux  Reaction Date: 22Dec2011; Annotation - [id number]: pt had cramping  tc/cma       Current Outpatient Medications   Medication Sig Dispense Refill    ADVAIR DISKUS 500-50 MCG/DOSE inhaler Inhale 1 puff 2 (two) times a day 1 Inhaler 3    albuterol (2 5 mg/3 mL) 0 083 % nebulizer solution USE 1 VIAL IN NEBULIZER EVERY 4 HOURS AS NEEDED FOR WHEEZING 75 vial 3    amLODIPine (NORVASC) 5 mg tablet Take 1 tablet (5 mg total) by mouth 2 (two) times a day 180 tablet 3    Biotin 5000 MCG CAPS Take 2 capsules by mouth daily      CALCIUM PO Take 600 mg by mouth      cetirizine (ZyrTEC) 10 mg tablet       chlorthalidone 25 mg tablet Take 25 mg by mouth daily      Cholecalciferol 1000 UNIT/10ML LIQD Take 1 g by mouth daily      fluticasone-salmeterol (ADVAIR DISKUS) 500-50 mcg/dose inhaler Inhale 1 puff 2 (two) times a day      ipratropium-albuterol (DUO-NEB) 0 5-2 5 mg/3 mL nebulizer solution Take 1 vial (3 mL total) by nebulization every 6 (six) hours as needed for wheezing or shortness of breath 25 vial 0    losartan (COZAAR) 50 mg tablet Take 25 mg by mouth daily      Magnesium 400 MG TABS Take by mouth      montelukast (SINGULAIR) 10 mg tablet Take 1 tablet (10 mg total) by mouth daily at bedtime 90 tablet 3    omeprazole (PriLOSEC) 40 MG capsule Take by mouth      rosuvastatin (CRESTOR) 5 mg tablet Take 1 tablet (5 mg total) by mouth daily 90 tablet 3    albuterol (PROAIR HFA) 90 mcg/act inhaler Inhale 2 puffs every 4 (four) hours as needed for wheezing 1 Inhaler 5    betamethasone dipropionate (DIPROSONE) 0 05 % cream Apply topically 2 (two) times a day 30 g 5    famotidine (PEPCID) 20 mg tablet Take 1 tablet (20 mg total) by mouth 2 (two) times a day 60 tablet 0    promethazine-codeine (PHENERGAN WITH CODEINE) 6 25-10 mg/5 mL syrup Take 5 mL by mouth every 4 (four) hours as needed for cough 120 mL 0     No current facility-administered medications for this visit  Social History     Socioeconomic History    Marital status: /Civil Union     Spouse name: None    Number of children: None    Years of education: None    Highest education level: None   Occupational History    Occupation: RETIRED WORKED AS    Social Needs    Financial resource strain: None    Food insecurity:     Worry: None     Inability: None    Transportation needs:     Medical: None     Non-medical: None   Tobacco Use    Smoking status: Former Smoker     Packs/day: 1 00     Years: 20 00     Pack years: 20 00    Smokeless tobacco: Never Used    Tobacco comment: QUIT AT THE AGE OF 50, DENIED: HISTORY OF SECOND HAND SMOKE EXPOSURE   Substance and Sexual Activity    Alcohol use: Yes     Comment: SOCIAL, DAILY    Drug use: No    Sexual activity: None   Lifestyle    Physical activity:     Days per week: None     Minutes per session: None    Stress: None   Relationships    Social connections:     Talks on phone: None     Gets together: None     Attends [employer]ist service: None     Active member of club or organization: None     Attends meetings of clubs or organizations: None     Relationship status: None    Intimate partner violence:     Fear of current or ex partner: None     Emotionally abused: None     Physically abused: None     Forced sexual activity: None   Other Topics Concern    None   Social History Narrative    DENIED: HISTORY OF PETS/ANIMALS    DENIED: HISTORY OF TRAVEL HISTORY       Review of Systems   Constitutional: Positive for fatigue  Respiratory:        Asthma   Gastrointestinal:        GERD   Musculoskeletal: Positive for arthralgias and myalgias  Allergic/Immunologic: Positive for environmental allergies  Neurological: Negative  Psychiatric/Behavioral: Positive for sleep disturbance  The patient is nervous/anxious  Objective:    /60 (BP Location: Left arm, Patient Position: Sitting, Cuff Size: Large)   Pulse 76   Temp 98 6 °F (37 °C)   Resp 16   Ht 5' 4" (1 626 m)   Wt 88 5 kg (195 lb)   SpO2 97%   BMI 33 47 kg/m²        Physical Exam   Constitutional: She is oriented to person, place, and time  She appears well-developed and well-nourished  No distress  Neck: Neck supple  Cardiovascular: Normal rate and regular rhythm  Pulmonary/Chest: Effort normal and breath sounds normal  No respiratory distress  Abdominal: Soft  Bowel sounds are normal  There is no tenderness  Musculoskeletal: She exhibits tenderness  Neurological: She is alert and oriented to person, place, and time  No cranial nerve deficit  Psychiatric: She has a normal mood and affect  Nursing note and vitals reviewed  Assessment/Plan:     Diagnoses and all orders for this visit:    Osteopenia of multiple sites  Comments:  Continue calcium, vitamin-D, magnesium supplement weight-bearing exercise  Remote use of Fosamax, hold restart secondary to GERD    Moderate asthma, unspecified whether complicated, unspecified whether persistent  Comments:  refilled albuterol vials for neb/cont inh use prn  and singulair daily  cont other meds/inh as listed   check cxr, pulmonary referral  Orders:  -     albuterol (2 5 mg/3 mL) 0 083 % nebulizer solution; USE 1 VIAL IN NEBULIZER EVERY 4 HOURS AS NEEDED FOR WHEEZING  -     Ambulatory referral to Pulmonology;  Future    Gastroesophageal reflux disease with esophagitis  Comments:  Rx famotidine to replace report omeprazole, dietary modification, continued efforts at weight loss  Orders:  - famotidine (PEPCID) 20 mg tablet; Take 1 tablet (20 mg total) by mouth 2 (two) times a day    Breast screening  Comments:  Mammo order given    Orders:  -     Mammo screening bilateral w 3d & cad; Future                Leta Pal MD

## 2019-11-02 NOTE — PROGRESS NOTES
Chief Complaint   Patient presents with    Cough    Medication Refill    Blood Pressure Check    GERD        Patient ID: Hernan Acevedo is a 69 y o  female  62-year-old patient in for blood pressure check and follow-up on multiple chronic conditions  Has been to [facility] our last visit  No medication changes made  Will be scheduling appointment for pulmonologist near future  Continues with cough  Denies fever or hemoptysis  Requests refills for inhaler and nebulizer med vials  BP in acceptable range        Past Medical History:   Diagnosis Date    Allergic rhinitis 08/25/2008    Arthritis     LAST ASSESSED: [id number]    Asthma     Asthma with acute exacerbation 06/24/2009    Bilateral renal cysts     RESOLVED: [id number]    Eczema     LAST ASSESSED: [id number]    Hiatal hernia     LAST ASSESSED: [id number]    Hirsutism 09/26/2011    Osteopenia of multiple sites 9/5/2012    Procedure/Onset: 05/22/2009    Pleurisy     LAST ASSESSED: [id number]    Pneumonia     LAST ASSESSED: [id number]    Umbilical hernia 38/01/6234       Past Surgical History:   Procedure Laterality Date    APPENDECTOMY      LAST ASSESSED: [id number]    CYSTECTOMY      OVARIAN    INCISIONAL BREAST BIOPSY      TONSILLECTOMY         Patient Active Problem List   Diagnosis    DM type 2 with diabetic mixed hyperlipidemia ([facility] [address] )    Depression with anxiety    Borderline hyperlipidemia    Benign essential hypertension    Asthma    Gastroesophageal reflux disease with esophagitis    Hyperlipidemia    Osteopenia of multiple sites    Nonalcoholic fatty liver disease    Pulmonary nodule seen on imaging study    Suspected sleep apnea    Infected abrasion of right hand    Anterior scleritis    Senile cataract    Simple chronic bronchitis (HCC)    Dizziness    Hypertension    Obesity    Sedentary lifestyle       Family History   Problem Relation Age of Onset    Cancer Mother     Hypertension Mother BENIGN ESSENTIAL HYPERTENSION WITH TARGET BLOOD PRESSURE BELOW 140/90    Ovarian cancer Mother     Hypertension Father         BENIGN ESSENTIAL HYPERTENSION WITH TARGET BLOOD PRESSURE BELOW 140/90       Immunization History   Administered Date(s) Administered    Pneumococcal Conjugate 13-Valent 01/06/2016    Pneumococcal Polysaccharide PPV23 05/22/2013    Tdap 12/14/2012       Allergies   Allergen Reactions    Aspirin      Stomach bleeding  Stomach bleeding  Stomach bleeding    Ciprofloxacin      Acid reflux  Acid reflux  Reaction Date: 22Dec2011; Annotation - [id number]: pt had cramping  tc/cma       Current Outpatient Medications   Medication Sig Dispense Refill    ADVAIR DISKUS 500-50 MCG/DOSE inhaler Inhale 1 puff 2 (two) times a day 1 Inhaler 3    albuterol (2 5 mg/3 mL) 0 083 % nebulizer solution USE 1 VIAL IN NEBULIZER EVERY 4 HOURS AS NEEDED FOR WHEEZING 75 vial 3    amLODIPine (NORVASC) 5 mg tablet Take 1 tablet (5 mg total) by mouth 2 (two) times a day 180 tablet 3    Biotin 5000 MCG CAPS Take 2 capsules by mouth daily      CALCIUM PO Take 600 mg by mouth      chlorthalidone 25 mg tablet Take 25 mg by mouth daily      Cholecalciferol 1000 UNIT/10ML LIQD Take 1 g by mouth daily      famotidine (PEPCID) 20 mg tablet Take 1 tablet (20 mg total) by mouth 2 (two) times a day 60 tablet 0    fluticasone-salmeterol (ADVAIR DISKUS) 500-50 mcg/dose inhaler Inhale 1 puff 2 (two) times a day      ipratropium-albuterol (DUO-NEB) 0 5-2 5 mg/3 mL nebulizer solution Take 1 vial (3 mL total) by nebulization every 6 (six) hours as needed for wheezing or shortness of breath 25 vial 0    losartan (COZAAR) 50 mg tablet Take 25 mg by mouth daily      Magnesium 400 MG TABS Take by mouth      montelukast (SINGULAIR) 10 mg tablet Take 1 tablet (10 mg total) by mouth daily at bedtime 90 tablet 3    omeprazole (PriLOSEC) 40 MG capsule Take by mouth      rosuvastatin (CRESTOR) 5 mg tablet Take 1 tablet (5 mg total) by mouth daily 90 tablet 3    albuterol (PROAIR HFA) 90 mcg/act inhaler Inhale 2 puffs every 4 (four) hours as needed for wheezing 1 Inhaler 5    betamethasone dipropionate (DIPROSONE) 0 05 % cream Apply topically 2 (two) times a day 30 g 5    cetirizine (ZyrTEC) 10 mg tablet       promethazine-codeine (PHENERGAN WITH CODEINE) 6 25-10 mg/5 mL syrup Take 5 mL by mouth every 4 (four) hours as needed for cough 120 mL 0     No current facility-administered medications for this visit          Social History     Socioeconomic History    Marital status: /Civil Union     Spouse name: None    Number of children: None    Years of education: None    Highest education level: None   Occupational History    Occupation: RETIRED WORKED AS    Social Needs    Financial resource strain: None    Food insecurity:     Worry: None     Inability: None    Transportation needs:     Medical: None     Non-medical: None   Tobacco Use    Smoking status: Former Smoker     Packs/day: 1 00     Years: 20 00     Pack years: 20 00    Smokeless tobacco: Never Used    Tobacco comment: QUIT AT THE AGE OF 50, DENIED: HISTORY OF SECOND HAND SMOKE EXPOSURE   Substance and Sexual Activity    Alcohol use: Yes     Comment: SOCIAL, DAILY    Drug use: No    Sexual activity: None   Lifestyle    Physical activity:     Days per week: None     Minutes per session: None    Stress: None   Relationships    Social connections:     Talks on phone: None     Gets together: None     Attends Restorationism service: None     Active member of club or organization: None     Attends meetings of clubs or organizations: None     Relationship status: None    Intimate partner violence:     Fear of current or ex partner: None     Emotionally abused: None     Physically abused: None     Forced sexual activity: None   Other Topics Concern    None   Social History Narrative    DENIED: HISTORY OF PETS/ANIMALS    DENIED: HISTORY OF TRAVEL HISTORY       Review of Systems   Constitutional: Positive for fatigue  Respiratory: Positive for wheezing  Asthma   Gastrointestinal:        GERD   Musculoskeletal: Positive for arthralgias and myalgias  Allergic/Immunologic: Positive for environmental allergies  Neurological: Negative  Psychiatric/Behavioral: Positive for sleep disturbance  The patient is nervous/anxious  Objective:    /70 (BP Location: Left arm, Patient Position: Sitting, Cuff Size: Large)   Pulse 82   Temp 98 2 °F (36 8 °C)   Resp 16   Ht 5' 4" (1 626 m)   Wt 87 5 kg (193 lb)   SpO2 94%   BMI 33 13 kg/m²        Physical Exam   Constitutional: She is oriented to person, place, and time  She appears well-developed and well-nourished  No distress  Neck: Neck supple  Cardiovascular: Normal rate and regular rhythm  Murmur heard  Pulmonary/Chest: Effort normal  No respiratory distress  occwheeze and coarse rhonchi,, no localization   Abdominal: Soft  Bowel sounds are normal  There is no tenderness  Musculoskeletal: She exhibits no tenderness  Neurological: She is alert and oriented to person, place, and time  No cranial nerve deficit  Skin: Skin is warm and dry  Eczematous patches   Psychiatric: She has a normal mood and affect  Nursing note and vitals reviewed  Assessment/Plan:     Diagnoses and all orders for this visit:    Cough  Comments:  rx Promethazine w codeine  Orders:  -     promethazine-codeine (PHENERGAN WITH CODEINE) 6 25-10 mg/5 mL syrup; Take 5 mL by mouth every 4 (four) hours as needed for cough  -     albuterol (PROAIR HFA) 90 mcg/act inhaler; Inhale 2 puffs every 4 (four) hours as needed for wheezing    Moderate asthma, unspecified whether complicated, unspecified whether persistent  Comments:  rxs albuterol inhaler and neb vials, cont SIngulair 10mg hs  Orders:  -     albuterol (PROAIR HFA) 90 mcg/act inhaler;  Inhale 2 puffs every 4 (four) hours as needed for wheezing    Eczema, unspecified type  Comments:  rx Betamethasone di[ropionate 0 05% cream  Orders:  -     betamethasone dipropionate (DIPROSONE) 0 05 % cream; Apply topically 2 (two) times a day    Benign essential hypertension  Comments:  BP wnl cont  same meds, maintain yearly eye checks  Gastroesophageal reflux disease with esophagitis  Comments:  controlled on Famotidine          Domitila Carvajal MD

## 2019-12-05 DIAGNOSIS — Z12.31 ENCOUNTER FOR SCREENING MAMMOGRAM FOR MALIGNANT NEOPLASM OF BREAST: ICD-10-CM

## 2020-01-06 ENCOUNTER — TELEPHONE (OUTPATIENT)
Dept: FAMILY MEDICINE CLINIC | Facility: CLINIC | Age: 75
End: 2020-01-06

## 2020-01-06 ENCOUNTER — OFFICE VISIT (OUTPATIENT)
Dept: FAMILY MEDICINE CLINIC | Facility: CLINIC | Age: 75
End: 2020-01-06
Payer: MEDICARE

## 2020-01-06 VITALS
TEMPERATURE: 97.7 F | HEART RATE: 88 BPM | WEIGHT: 192 LBS | OXYGEN SATURATION: 97 % | DIASTOLIC BLOOD PRESSURE: 62 MMHG | BODY MASS INDEX: 32.78 KG/M2 | HEIGHT: 64 IN | SYSTOLIC BLOOD PRESSURE: 110 MMHG | RESPIRATION RATE: 16 BRPM

## 2020-01-06 DIAGNOSIS — K21.00 GASTROESOPHAGEAL REFLUX DISEASE WITH ESOPHAGITIS: ICD-10-CM

## 2020-01-06 DIAGNOSIS — R05.9 COUGH: Primary | ICD-10-CM

## 2020-01-06 PROBLEM — E66.812 CLASS 2 SEVERE OBESITY DUE TO EXCESS CALORIES WITH SERIOUS COMORBIDITY IN ADULT (HCC): Status: ACTIVE | Noted: 2019-02-05

## 2020-01-06 PROBLEM — E66.01 CLASS 2 SEVERE OBESITY DUE TO EXCESS CALORIES WITH SERIOUS COMORBIDITY IN ADULT (HCC): Status: ACTIVE | Noted: 2019-02-05

## 2020-01-06 PROCEDURE — 99213 OFFICE O/P EST LOW 20 MIN: CPT | Performed by: FAMILY MEDICINE

## 2020-01-06 RX ORDER — AMOXICILLIN AND CLAVULANATE POTASSIUM 875; 125 MG/1; MG/1
1 TABLET, FILM COATED ORAL EVERY 12 HOURS SCHEDULED
Qty: 14 TABLET | Refills: 0 | Status: SHIPPED | OUTPATIENT
Start: 2020-01-06 | End: 2020-01-13

## 2020-01-06 RX ORDER — OMEPRAZOLE 40 MG/1
40 CAPSULE, DELAYED RELEASE ORAL DAILY
Qty: 90 CAPSULE | Refills: 1 | Status: SHIPPED | OUTPATIENT
Start: 2020-01-06 | End: 2020-11-28

## 2020-01-06 RX ORDER — TIOTROPIUM BROMIDE INHALATION SPRAY 3.12 UG/1
SPRAY, METERED RESPIRATORY (INHALATION)
COMMUNITY
Start: 2020-01-03 | End: 2022-03-29 | Stop reason: HOSPADM

## 2020-01-06 RX ORDER — PROMETHAZINE HYDROCHLORIDE AND CODEINE PHOSPHATE 6.25; 1 MG/5ML; MG/5ML
5 SYRUP ORAL EVERY 4 HOURS PRN
Qty: 120 ML | Refills: 0 | Status: SHIPPED | OUTPATIENT
Start: 2020-01-06 | End: 2020-03-13 | Stop reason: SDUPTHER

## 2020-01-06 RX ORDER — LOSARTAN POTASSIUM 100 MG/1
100 TABLET ORAL DAILY
COMMUNITY
Start: 2019-11-11

## 2020-01-06 NOTE — TELEPHONE ENCOUNTER
CALLED PT ADVISED SAME, SHE SAID WHEN SHE GOES TO THE PHARMACY LATER TO P/U ANTIBX SHE IS GOING TO ASK PHARMACIST HOW LONG IT WILL BE AND THEN DECIDE

## 2020-01-27 NOTE — PROGRESS NOTES
Assessment/Plan:     Diagnoses and all orders for this visit:    Cough  Comments:  Rx is for Augmentin and promethazine with codeine given  Continue inhaler use as directed  Schedule pulmonary follow-up Dr Green  Orders:  -     promethazine-codeine (PHENERGAN WITH CODEINE) 6 25-10 mg/5 mL syrup; Take 5 mL by mouth every 4 (four) hours as needed for cough  -     amoxicillin-clavulanate (AUGMENTIN) 875-125 mg per tablet; Take 1 tablet by mouth every 12 (twelve) hours for 7 days Take w food    Gastroesophageal reflux disease with esophagitis  Comments:  Rx omeprazole to replace famotidine  Dietary modifications  Orders:  -     omeprazole (PriLOSEC) 40 MG capsule; Take 1 capsule (40 mg total) by mouth daily    Other orders  -     losartan (COZAAR) 100 MG tablet  -     SPIRIVA RESPIMAT 2 5 MCG/ACT AERS inhaler            Subjective:      Patient ID: Lonnie Trejo is a 69 y o  female  Chief Complaint   Patient presents with    URI     coughing ,nasal congestion       URI    This is a new problem  The current episode started in the past 7 days  Associated symptoms include congestion, coughing, rhinorrhea, sinus pain and wheezing  Pertinent negatives include no rash  She has tried inhaler use and acetaminophen for the symptoms  The treatment provided mild relief    some inc GERD sxs off PPI  Non-smoker    The following portions of the patient's history were reviewed and updated as appropriate: allergies, current medications, past family history, past medical history, past social history, past surgical history and problem list      Review of Systems   HENT: Positive for congestion, rhinorrhea and sinus pain  Respiratory: Positive for cough and wheezing  Skin: Negative for rash           Objective:    /62 (BP Location: Left arm, Patient Position: Sitting, Cuff Size: Large)   Pulse 88   Temp 97 7 °F (36 5 °C)   Resp 16   Ht 5' 4" (1 626 m)   Wt 87 1 kg (192 lb)   SpO2 97%   BMI 32 96 kg/m² Physical Exam   Constitutional: She is oriented to person, place, and time  Acutely ill   HENT:   Nasal bogginess, postnasal drip  TMs dull bilaterally  Mild facial tenderness  Throat erythematous- no exudate   Eyes: Conjunctivae are normal  No scleral icterus  Neck: Neck supple  Cardiovascular: Normal rate  Pulmonary/Chest: Effort normal  No respiratory distress  Occasional scattered expiratory wheeze  No localized wheeze, rales or rhonchi   Abdominal: Soft  Bowel sounds are normal  There is tenderness (Mild epigastric tenderness, no guarding or rebound)  Neurological: She is alert and oriented to person, place, and time  No cranial nerve deficit  Skin: Skin is warm and dry  No rash noted  Nursing note and vitals reviewed        Johnny Pa MD

## 2020-02-04 LAB
LEFT EYE DIABETIC RETINOPATHY: NORMAL
RIGHT EYE DIABETIC RETINOPATHY: NORMAL

## 2020-02-10 DIAGNOSIS — I10 ESSENTIAL HYPERTENSION: ICD-10-CM

## 2020-02-10 RX ORDER — AMLODIPINE BESYLATE 5 MG/1
TABLET ORAL
Qty: 180 TABLET | Refills: 3 | Status: SHIPPED | OUTPATIENT
Start: 2020-02-10 | End: 2021-02-03

## 2020-03-10 DIAGNOSIS — J45.909 MODERATE ASTHMA, UNSPECIFIED WHETHER COMPLICATED, UNSPECIFIED WHETHER PERSISTENT: ICD-10-CM

## 2020-03-10 RX ORDER — ALBUTEROL SULFATE 2.5 MG/3ML
SOLUTION RESPIRATORY (INHALATION)
Qty: 75 VIAL | Refills: 3 | Status: SHIPPED | OUTPATIENT
Start: 2020-03-10 | End: 2020-08-15

## 2020-03-10 NOTE — TELEPHONE ENCOUNTER
Dr Cornell Perry:    Patient needs a refill of albuterol nebulizer solution sent to [facility] in [place]

## 2020-03-13 DIAGNOSIS — E78.5 HYPERLIPIDEMIA, UNSPECIFIED HYPERLIPIDEMIA TYPE: ICD-10-CM

## 2020-03-13 DIAGNOSIS — R05.9 COUGH: ICD-10-CM

## 2020-03-13 DIAGNOSIS — J45.909 MODERATE ASTHMA WITHOUT COMPLICATION, UNSPECIFIED WHETHER PERSISTENT: ICD-10-CM

## 2020-03-13 RX ORDER — PROMETHAZINE HYDROCHLORIDE AND CODEINE PHOSPHATE 6.25; 1 MG/5ML; MG/5ML
5 SYRUP ORAL EVERY 4 HOURS PRN
Qty: 120 ML | Refills: 0 | Status: SHIPPED | OUTPATIENT
Start: 2020-03-13 | End: 2020-06-18

## 2020-03-13 RX ORDER — MONTELUKAST SODIUM 10 MG/1
TABLET ORAL
Qty: 90 TABLET | Refills: 3 | Status: SHIPPED | OUTPATIENT
Start: 2020-03-13 | End: 2021-03-25

## 2020-03-13 RX ORDER — ROSUVASTATIN CALCIUM 5 MG/1
TABLET, COATED ORAL
Qty: 90 TABLET | Refills: 3 | Status: SHIPPED | OUTPATIENT
Start: 2020-03-13 | End: 2021-03-09

## 2020-03-23 DIAGNOSIS — R05.9 COUGH: Primary | ICD-10-CM

## 2020-03-23 RX ORDER — AZITHROMYCIN 250 MG/1
TABLET, FILM COATED ORAL
Qty: 6 TABLET | Refills: 0 | Status: SHIPPED | OUTPATIENT
Start: 2020-03-23 | End: 2020-03-28

## 2020-06-11 ENCOUNTER — TELEPHONE (OUTPATIENT)
Dept: FAMILY MEDICINE CLINIC | Facility: CLINIC | Age: 75
End: 2020-06-11

## 2020-06-19 ENCOUNTER — OFFICE VISIT (OUTPATIENT)
Dept: FAMILY MEDICINE CLINIC | Facility: CLINIC | Age: 75
End: 2020-06-19
Payer: MEDICARE

## 2020-06-19 VITALS
WEIGHT: 199 LBS | HEIGHT: 64 IN | TEMPERATURE: 98.8 F | BODY MASS INDEX: 33.97 KG/M2 | SYSTOLIC BLOOD PRESSURE: 142 MMHG | RESPIRATION RATE: 14 BRPM | HEART RATE: 78 BPM | DIASTOLIC BLOOD PRESSURE: 80 MMHG

## 2020-06-19 DIAGNOSIS — Z12.31 BREAST CANCER SCREENING BY MAMMOGRAM: ICD-10-CM

## 2020-06-19 DIAGNOSIS — Z11.59 NEED FOR HEPATITIS C SCREENING TEST: ICD-10-CM

## 2020-06-19 DIAGNOSIS — I10 ESSENTIAL HYPERTENSION: ICD-10-CM

## 2020-06-19 DIAGNOSIS — E78.5 HYPERLIPIDEMIA, UNSPECIFIED HYPERLIPIDEMIA TYPE: ICD-10-CM

## 2020-06-19 DIAGNOSIS — E78.2 DM TYPE 2 WITH DIABETIC MIXED HYPERLIPIDEMIA (HCC): ICD-10-CM

## 2020-06-19 DIAGNOSIS — R05.9 COUGH: ICD-10-CM

## 2020-06-19 DIAGNOSIS — S63.501S SPRAIN OF RIGHT WRIST, SEQUELA: Primary | ICD-10-CM

## 2020-06-19 DIAGNOSIS — E11.69 DM TYPE 2 WITH DIABETIC MIXED HYPERLIPIDEMIA (HCC): ICD-10-CM

## 2020-06-19 PROCEDURE — 1160F RVW MEDS BY RX/DR IN RCRD: CPT | Performed by: FAMILY MEDICINE

## 2020-06-19 PROCEDURE — 3079F DIAST BP 80-89 MM HG: CPT | Performed by: FAMILY MEDICINE

## 2020-06-19 PROCEDURE — 3077F SYST BP >= 140 MM HG: CPT | Performed by: FAMILY MEDICINE

## 2020-06-19 PROCEDURE — 2022F DILAT RTA XM EVC RTNOPTHY: CPT | Performed by: FAMILY MEDICINE

## 2020-06-19 PROCEDURE — 99214 OFFICE O/P EST MOD 30 MIN: CPT | Performed by: FAMILY MEDICINE

## 2020-06-19 PROCEDURE — 1036F TOBACCO NON-USER: CPT | Performed by: FAMILY MEDICINE

## 2020-06-19 PROCEDURE — 4040F PNEUMOC VAC/ADMIN/RCVD: CPT | Performed by: FAMILY MEDICINE

## 2020-06-19 RX ORDER — PROMETHAZINE HYDROCHLORIDE AND CODEINE PHOSPHATE 6.25; 1 MG/5ML; MG/5ML
5 SYRUP ORAL EVERY 4 HOURS PRN
Qty: 120 ML | Refills: 0 | Status: SHIPPED | OUTPATIENT
Start: 2020-06-19 | End: 2021-11-11

## 2020-06-30 PROBLEM — Z12.31 BREAST CANCER SCREENING BY MAMMOGRAM: Status: ACTIVE | Noted: 2020-06-30

## 2020-06-30 PROBLEM — Z11.59 NEED FOR HEPATITIS C SCREENING TEST: Status: ACTIVE | Noted: 2020-06-30

## 2020-06-30 PROBLEM — M81.0 OSTEOPOROSIS: Status: ACTIVE | Noted: 2020-06-30

## 2020-06-30 PROBLEM — S63.501A SPRAIN OF RIGHT WRIST: Status: ACTIVE | Noted: 2020-06-30

## 2020-06-30 PROBLEM — E55.9 VITAMIN D DEFICIENCY: Status: ACTIVE | Noted: 2020-06-30

## 2020-06-30 PROBLEM — I05.9 MITRAL VALVE DISEASE: Status: ACTIVE | Noted: 2020-06-30

## 2020-06-30 PROBLEM — R05.9 COUGH: Status: ACTIVE | Noted: 2020-06-30

## 2020-07-10 ENCOUNTER — TELEPHONE (OUTPATIENT)
Dept: FAMILY MEDICINE CLINIC | Facility: CLINIC | Age: 75
End: 2020-07-10

## 2020-07-10 DIAGNOSIS — J45.909 UNCOMPLICATED ASTHMA, UNSPECIFIED ASTHMA SEVERITY, UNSPECIFIED WHETHER PERSISTENT: ICD-10-CM

## 2020-08-03 ENCOUNTER — TELEPHONE (OUTPATIENT)
Dept: FAMILY MEDICINE CLINIC | Facility: CLINIC | Age: 75
End: 2020-08-03

## 2020-08-04 LAB
ALBUMIN SERPL-MCNC: 4.3 G/DL (ref 3.7–4.7)
ALBUMIN/GLOB SERPL: 1.7 {RATIO} (ref 1.2–2.2)
ALP SERPL-CCNC: 61 IU/L (ref 39–117)
ALT SERPL-CCNC: 23 IU/L (ref 0–32)
AMYLASE SERPL-CCNC: 56 U/L (ref 31–110)
AST SERPL-CCNC: 22 IU/L (ref 0–40)
BASOPHILS # BLD AUTO: 0.1 X10E3/UL (ref 0–0.2)
BASOPHILS NFR BLD AUTO: 1 %
BILIRUB SERPL-MCNC: 0.6 MG/DL (ref 0–1.2)
BUN SERPL-MCNC: 15 MG/DL (ref 8–27)
BUN/CREAT SERPL: 21 (ref 12–28)
CALCIUM SERPL-MCNC: 9.4 MG/DL (ref 8.7–10.3)
CHLORIDE SERPL-SCNC: 100 MMOL/L (ref 96–106)
CHOLEST SERPL-MCNC: 175 MG/DL (ref 100–199)
CO2 SERPL-SCNC: 25 MMOL/L (ref 20–29)
CREAT SERPL-MCNC: 0.72 MG/DL (ref 0.57–1)
EOSINOPHIL # BLD AUTO: 0.2 X10E3/UL (ref 0–0.4)
EOSINOPHIL NFR BLD AUTO: 3 %
ERYTHROCYTE [DISTWIDTH] IN BLOOD BY AUTOMATED COUNT: 11.4 % (ref 11.7–15.4)
GLOBULIN SER-MCNC: 2.5 G/DL (ref 1.5–4.5)
GLUCOSE SERPL-MCNC: 102 MG/DL (ref 65–99)
HBA1C MFR BLD: 5.6 % (ref 4.8–5.6)
HCT VFR BLD AUTO: 42.8 % (ref 34–46.6)
HCV AB S/CO SERPL IA: <0.1 S/CO RATIO (ref 0–0.9)
HDLC SERPL-MCNC: 66 MG/DL
HGB BLD-MCNC: 14.8 G/DL (ref 11.1–15.9)
IMM GRANULOCYTES # BLD: 0 X10E3/UL (ref 0–0.1)
IMM GRANULOCYTES NFR BLD: 1 %
LDLC SERPL CALC-MCNC: 84 MG/DL (ref 0–99)
LIPASE SERPL-CCNC: 33 U/L (ref 14–85)
LYMPHOCYTES # BLD AUTO: 2.4 X10E3/UL (ref 0.7–3.1)
LYMPHOCYTES NFR BLD AUTO: 37 %
MAGNESIUM SERPL-MCNC: 1.9 MG/DL (ref 1.6–2.3)
MCH RBC QN AUTO: 31.2 PG (ref 26.6–33)
MCHC RBC AUTO-ENTMCNC: 34.6 G/DL (ref 31.5–35.7)
MCV RBC AUTO: 90 FL (ref 79–97)
MICRODELETION SYND BLD/T FISH: NORMAL
MONOCYTES # BLD AUTO: 0.4 X10E3/UL (ref 0.1–0.9)
MONOCYTES NFR BLD AUTO: 7 %
NEUTROPHILS # BLD AUTO: 3.4 X10E3/UL (ref 1.4–7)
NEUTROPHILS NFR BLD AUTO: 51 %
PLATELET # BLD AUTO: 241 X10E3/UL (ref 150–450)
POTASSIUM SERPL-SCNC: 3.9 MMOL/L (ref 3.5–5.2)
PROT SERPL-MCNC: 6.8 G/DL (ref 6–8.5)
RBC # BLD AUTO: 4.75 X10E6/UL (ref 3.77–5.28)
SL AMB EGFR AFRICAN AMERICAN: 96 ML/MIN/1.73
SL AMB EGFR NON AFRICAN AMERICAN: 83 ML/MIN/1.73
SODIUM SERPL-SCNC: 140 MMOL/L (ref 134–144)
TRIGL SERPL-MCNC: 127 MG/DL (ref 0–149)
TSH SERPL DL<=0.005 MIU/L-ACNC: 2.53 UIU/ML (ref 0.45–4.5)
WBC # BLD AUTO: 6.5 X10E3/UL (ref 3.4–10.8)

## 2020-08-10 NOTE — TELEPHONE ENCOUNTER
CALLED PT ADVISED HER TO REMIND [facility] TO SEND US CONSULT AND LABS, ALSO MAILED COPY OF LABS TO PT PER HER REQUEST

## 2020-08-15 DIAGNOSIS — J45.909 MODERATE ASTHMA, UNSPECIFIED WHETHER COMPLICATED, UNSPECIFIED WHETHER PERSISTENT: ICD-10-CM

## 2020-08-15 RX ORDER — ALBUTEROL SULFATE 2.5 MG/3ML
SOLUTION RESPIRATORY (INHALATION)
Qty: 225 ML | Refills: 0 | Status: SHIPPED | OUTPATIENT
Start: 2020-08-15 | End: 2020-09-09 | Stop reason: SDUPTHER

## 2020-09-05 DIAGNOSIS — J45.909 UNCOMPLICATED ASTHMA, UNSPECIFIED ASTHMA SEVERITY, UNSPECIFIED WHETHER PERSISTENT: ICD-10-CM

## 2020-09-05 DIAGNOSIS — R06.2 WHEEZE: ICD-10-CM

## 2020-09-05 RX ORDER — IPRATROPIUM BROMIDE AND ALBUTEROL SULFATE 2.5; .5 MG/3ML; MG/3ML
3 SOLUTION RESPIRATORY (INHALATION) EVERY 6 HOURS PRN
Qty: 90 VIAL | Refills: 3 | Status: SHIPPED | OUTPATIENT
Start: 2020-09-05 | End: 2020-09-08 | Stop reason: SDUPTHER

## 2020-09-08 ENCOUNTER — TELEPHONE (OUTPATIENT)
Dept: FAMILY MEDICINE CLINIC | Facility: CLINIC | Age: 75
End: 2020-09-08

## 2020-09-08 DIAGNOSIS — R06.2 WHEEZE: ICD-10-CM

## 2020-09-08 DIAGNOSIS — J45.909 UNCOMPLICATED ASTHMA, UNSPECIFIED ASTHMA SEVERITY, UNSPECIFIED WHETHER PERSISTENT: ICD-10-CM

## 2020-09-08 RX ORDER — IPRATROPIUM BROMIDE AND ALBUTEROL SULFATE 2.5; .5 MG/3ML; MG/3ML
3 SOLUTION RESPIRATORY (INHALATION) EVERY 6 HOURS PRN
Qty: 90 VIAL | Refills: 3 | Status: SHIPPED | OUTPATIENT
Start: 2020-09-08 | End: 2021-03-18

## 2020-09-08 RX ORDER — IPRATROPIUM BROMIDE AND ALBUTEROL SULFATE 2.5; .5 MG/3ML; MG/3ML
3 SOLUTION RESPIRATORY (INHALATION) EVERY 6 HOURS PRN
Qty: 90 VIAL | Refills: 3 | Status: SHIPPED | OUTPATIENT
Start: 2020-09-08 | End: 2020-09-08 | Stop reason: SDUPTHER

## 2020-09-09 DIAGNOSIS — J45.909 MODERATE ASTHMA, UNSPECIFIED WHETHER COMPLICATED, UNSPECIFIED WHETHER PERSISTENT: ICD-10-CM

## 2020-09-09 RX ORDER — ALBUTEROL SULFATE 2.5 MG/3ML
SOLUTION RESPIRATORY (INHALATION)
Qty: 225 ML | Refills: 3 | Status: SHIPPED | OUTPATIENT
Start: 2020-09-09 | End: 2020-09-28

## 2020-09-28 ENCOUNTER — TELEPHONE (OUTPATIENT)
Dept: FAMILY MEDICINE CLINIC | Facility: CLINIC | Age: 75
End: 2020-09-28

## 2020-09-28 DIAGNOSIS — J45.909 MODERATE ASTHMA, UNSPECIFIED WHETHER COMPLICATED, UNSPECIFIED WHETHER PERSISTENT: ICD-10-CM

## 2020-09-28 RX ORDER — ALBUTEROL SULFATE 2.5 MG/3ML
SOLUTION RESPIRATORY (INHALATION)
Qty: 225 ML | Refills: 3 | Status: SHIPPED | OUTPATIENT
Start: 2020-09-28 | End: 2022-03-04 | Stop reason: HOSPADM

## 2020-09-28 NOTE — TELEPHONE ENCOUNTER
Patient said duoneb works better    She also has an appt with a pulminologist for her cough coming up (FYI) tc/cma

## 2020-09-28 NOTE — TELEPHONE ENCOUNTER
[facility] DR MARIANNA KABA called - Insurance will only cover Duoneb or Albuterol - insurance will not cover both - if you want patient to be on both, the pharmacist said she can "cash it out" and patient will have to pay out of pocket for the Albuterol   [phone]

## 2020-11-28 DIAGNOSIS — K21.00 GASTROESOPHAGEAL REFLUX DISEASE WITH ESOPHAGITIS: ICD-10-CM

## 2020-11-28 RX ORDER — OMEPRAZOLE 40 MG/1
CAPSULE, DELAYED RELEASE ORAL
Qty: 90 CAPSULE | Refills: 3 | Status: SHIPPED | OUTPATIENT
Start: 2020-11-28

## 2020-12-04 ENCOUNTER — TELEMEDICINE (OUTPATIENT)
Dept: FAMILY MEDICINE CLINIC | Facility: CLINIC | Age: 75
End: 2020-12-04
Payer: MEDICARE

## 2020-12-04 VITALS
TEMPERATURE: 97.8 F | RESPIRATION RATE: 16 BRPM | HEART RATE: 88 BPM | HEIGHT: 64 IN | DIASTOLIC BLOOD PRESSURE: 60 MMHG | BODY MASS INDEX: 31.07 KG/M2 | WEIGHT: 182 LBS | SYSTOLIC BLOOD PRESSURE: 120 MMHG

## 2020-12-04 DIAGNOSIS — E11.69 DM TYPE 2 WITH DIABETIC MIXED HYPERLIPIDEMIA (HCC): ICD-10-CM

## 2020-12-04 DIAGNOSIS — Z00.00 MEDICARE ANNUAL WELLNESS VISIT, SUBSEQUENT: ICD-10-CM

## 2020-12-04 DIAGNOSIS — E78.2 DM TYPE 2 WITH DIABETIC MIXED HYPERLIPIDEMIA (HCC): ICD-10-CM

## 2020-12-04 DIAGNOSIS — G62.9 NEUROPATHY: ICD-10-CM

## 2020-12-04 DIAGNOSIS — R05.9 COUGH: Primary | ICD-10-CM

## 2020-12-04 DIAGNOSIS — Z12.31 ENCOUNTER FOR SCREENING MAMMOGRAM FOR MALIGNANT NEOPLASM OF BREAST: ICD-10-CM

## 2020-12-04 LAB — SL AMB POCT HEMOGLOBIN AIC: 5.6 (ref ?–6.5)

## 2020-12-04 PROCEDURE — 1123F ACP DISCUSS/DSCN MKR DOCD: CPT | Performed by: FAMILY MEDICINE

## 2020-12-04 PROCEDURE — 99214 OFFICE O/P EST MOD 30 MIN: CPT | Performed by: FAMILY MEDICINE

## 2020-12-04 PROCEDURE — G0439 PPPS, SUBSEQ VISIT: HCPCS | Performed by: FAMILY MEDICINE

## 2020-12-04 PROCEDURE — 83036 HEMOGLOBIN GLYCOSYLATED A1C: CPT | Performed by: FAMILY MEDICINE

## 2020-12-04 RX ORDER — GABAPENTIN 300 MG/1
300 CAPSULE ORAL
Qty: 30 CAPSULE | Refills: 2 | Status: SHIPPED | OUTPATIENT
Start: 2020-12-04 | End: 2022-03-04 | Stop reason: HOSPADM

## 2020-12-04 RX ORDER — BENZONATATE 200 MG/1
200 CAPSULE ORAL 3 TIMES DAILY PRN
Qty: 30 CAPSULE | Refills: 1 | Status: SHIPPED | OUTPATIENT
Start: 2020-12-04 | End: 2021-11-11

## 2020-12-05 PROBLEM — G62.9 NEUROPATHY: Status: ACTIVE | Noted: 2020-12-05

## 2020-12-05 PROBLEM — Z00.00 MEDICARE ANNUAL WELLNESS VISIT, SUBSEQUENT: Status: ACTIVE | Noted: 2020-06-30

## 2020-12-07 ENCOUNTER — TELEPHONE (OUTPATIENT)
Dept: ADMINISTRATIVE | Facility: OTHER | Age: 75
End: 2020-12-07

## 2021-02-01 DIAGNOSIS — Z12.9 ENCOUNTER FOR SCREENING FOR MALIGNANT NEOPLASM: ICD-10-CM

## 2021-02-03 DIAGNOSIS — I10 ESSENTIAL HYPERTENSION: ICD-10-CM

## 2021-02-03 RX ORDER — AMLODIPINE BESYLATE 5 MG/1
TABLET ORAL
Qty: 180 TABLET | Refills: 3 | Status: SHIPPED | OUTPATIENT
Start: 2021-02-03 | End: 2022-02-22

## 2021-03-09 DIAGNOSIS — E78.5 HYPERLIPIDEMIA, UNSPECIFIED HYPERLIPIDEMIA TYPE: ICD-10-CM

## 2021-03-09 RX ORDER — ROSUVASTATIN CALCIUM 5 MG/1
TABLET, COATED ORAL
Qty: 90 TABLET | Refills: 3 | Status: SHIPPED | OUTPATIENT
Start: 2021-03-09 | End: 2022-02-22

## 2021-03-18 DIAGNOSIS — J45.909 UNCOMPLICATED ASTHMA, UNSPECIFIED ASTHMA SEVERITY, UNSPECIFIED WHETHER PERSISTENT: ICD-10-CM

## 2021-03-18 DIAGNOSIS — R06.2 WHEEZE: ICD-10-CM

## 2021-03-18 RX ORDER — IPRATROPIUM BROMIDE AND ALBUTEROL SULFATE 2.5; .5 MG/3ML; MG/3ML
SOLUTION RESPIRATORY (INHALATION)
Qty: 270 ML | Refills: 0 | Status: SHIPPED | OUTPATIENT
Start: 2021-03-18 | End: 2021-05-15 | Stop reason: SDUPTHER

## 2021-03-25 DIAGNOSIS — J45.909 MODERATE ASTHMA WITHOUT COMPLICATION, UNSPECIFIED WHETHER PERSISTENT: ICD-10-CM

## 2021-03-25 RX ORDER — MONTELUKAST SODIUM 10 MG/1
TABLET ORAL
Qty: 90 TABLET | Refills: 3 | Status: SHIPPED | OUTPATIENT
Start: 2021-03-25 | End: 2022-02-25

## 2021-03-30 DIAGNOSIS — Z23 ENCOUNTER FOR IMMUNIZATION: ICD-10-CM

## 2021-05-15 DIAGNOSIS — J45.909 UNCOMPLICATED ASTHMA, UNSPECIFIED ASTHMA SEVERITY, UNSPECIFIED WHETHER PERSISTENT: ICD-10-CM

## 2021-05-15 DIAGNOSIS — R06.2 WHEEZE: ICD-10-CM

## 2021-05-17 DIAGNOSIS — R06.2 WHEEZE: ICD-10-CM

## 2021-05-17 DIAGNOSIS — J45.909 UNCOMPLICATED ASTHMA, UNSPECIFIED ASTHMA SEVERITY, UNSPECIFIED WHETHER PERSISTENT: ICD-10-CM

## 2021-05-17 RX ORDER — IPRATROPIUM BROMIDE AND ALBUTEROL SULFATE 2.5; .5 MG/3ML; MG/3ML
3 SOLUTION RESPIRATORY (INHALATION) EVERY 6 HOURS PRN
Qty: 270 ML | Refills: 6 | Status: SHIPPED | OUTPATIENT
Start: 2021-05-17 | End: 2022-05-18 | Stop reason: SDUPTHER

## 2021-05-17 RX ORDER — IPRATROPIUM BROMIDE AND ALBUTEROL SULFATE 2.5; .5 MG/3ML; MG/3ML
3 SOLUTION RESPIRATORY (INHALATION) EVERY 6 HOURS PRN
Qty: 270 ML | Refills: 0 | Status: SHIPPED | OUTPATIENT
Start: 2021-05-17 | End: 2021-05-17 | Stop reason: SDUPTHER

## 2021-06-16 LAB
LEFT EYE DIABETIC RETINOPATHY: NORMAL
RIGHT EYE DIABETIC RETINOPATHY: NORMAL

## 2021-06-18 DIAGNOSIS — J45.909 UNCOMPLICATED ASTHMA, UNSPECIFIED ASTHMA SEVERITY, UNSPECIFIED WHETHER PERSISTENT: ICD-10-CM

## 2021-07-02 ENCOUNTER — TELEPHONE (OUTPATIENT)
Dept: ADMINISTRATIVE | Facility: OTHER | Age: 76
End: 2021-07-02

## 2021-07-02 ENCOUNTER — OFFICE VISIT (OUTPATIENT)
Dept: FAMILY MEDICINE CLINIC | Facility: CLINIC | Age: 76
End: 2021-07-02
Payer: MEDICARE

## 2021-07-02 VITALS
HEIGHT: 64 IN | RESPIRATION RATE: 18 BRPM | HEART RATE: 88 BPM | WEIGHT: 186.8 LBS | BODY MASS INDEX: 31.89 KG/M2 | SYSTOLIC BLOOD PRESSURE: 128 MMHG | DIASTOLIC BLOOD PRESSURE: 70 MMHG | TEMPERATURE: 97.8 F

## 2021-07-02 DIAGNOSIS — Z86.39 HISTORY OF DIABETES MELLITUS: ICD-10-CM

## 2021-07-02 DIAGNOSIS — R30.0 DYSURIA: Primary | ICD-10-CM

## 2021-07-02 LAB
SL AMB  POCT GLUCOSE, UA: ABNORMAL
SL AMB LEUKOCYTE ESTERASE,UA: 500
SL AMB POCT BILIRUBIN,UA: ABNORMAL
SL AMB POCT BLOOD,UA: 50
SL AMB POCT CLARITY,UA: ABNORMAL
SL AMB POCT COLOR,UA: ABNORMAL
SL AMB POCT HEMOGLOBIN AIC: 5.5 (ref ?–6.5)
SL AMB POCT KETONES,UA: ABNORMAL
SL AMB POCT NITRITE,UA: ABNORMAL
SL AMB POCT PH,UA: 6.5
SL AMB POCT SPECIFIC GRAVITY,UA: 1.01
SL AMB POCT URINE PROTEIN: ABNORMAL
SL AMB POCT UROBILINOGEN: ABNORMAL

## 2021-07-02 PROCEDURE — 81003 URINALYSIS AUTO W/O SCOPE: CPT | Performed by: FAMILY MEDICINE

## 2021-07-02 PROCEDURE — 99213 OFFICE O/P EST LOW 20 MIN: CPT | Performed by: FAMILY MEDICINE

## 2021-07-02 PROCEDURE — 83036 HEMOGLOBIN GLYCOSYLATED A1C: CPT | Performed by: FAMILY MEDICINE

## 2021-07-02 RX ORDER — NITROFURANTOIN 25; 75 MG/1; MG/1
100 CAPSULE ORAL 2 TIMES DAILY
Qty: 10 CAPSULE | Refills: 0 | Status: SHIPPED | OUTPATIENT
Start: 2021-07-02 | End: 2021-07-07

## 2021-07-02 RX ORDER — VITAMIN B COMPLEX
1 CAPSULE ORAL
COMMUNITY

## 2021-07-02 NOTE — TELEPHONE ENCOUNTER
----- Message from Demian Coyne sent at 7/2/2021 10:57 AM EDT -----  Regarding: dm eye exam  07/02/21 10:57 AM    Hello, our patient Prosper Lira has had  dmeye exam  completed/performed  Please assist in updating the patient chart by Dr Claudia Mc[facility]   [facility]  The date of service is 6/2021    Thank you,  PALOMA Dumont PG

## 2021-07-02 NOTE — LETTER
Diabetic Eye Exam Form    Date Requested: 21  Patient: Jere Aguilar  Patient : 1945   Referring Provider: Herbie Betnacur MD    Dilated Retinal Exam, Optomap-Iris Exam, or Fundus Photography Done         Yes ([place] one above)         No     Date of Diabetic Eye Exam ______________________________  Left Eye      Exam did show retinopathy    Exam did not show retinopathy         Mild       Moderate       None       Proliferative       Severe     Right Eye     Exam did show retinopathy    Exam did not show retinopathy         Mild       Moderate       None       Proliferative       Severe     Comments __________________________________________________________    Practice Providing Exam ______________________________________________    Exam Performed By (print name) _______________________________________      Provider Signature ___________________________________________________      These reports are needed for  compliance    Please fax this completed form and a copy of the Diabetic Eye Exam report to our office located at [place] [address] as soon as possible to [phone] halley Aguilera: Phone [phone]    We thank you for your assistance in treating our mutual patient

## 2021-07-02 NOTE — TELEPHONE ENCOUNTER
Upon review of the In Basket request and the patient's chart, initial outreach has been made via fax, please see Contacts section for details        [phone]    Thank you  Siomara Lance MA

## 2021-07-02 NOTE — LETTER
Diabetic Eye Exam Form/2nd request    Date Requested: 21  Patient: Nicolle Soliz  Patient : 1945   Referring Provider: Kristen Salazar MD    Dilated Retinal Exam, Optomap-Iris Exam, or Fundus Photography Done         Yes ([place] one above)         No     Date of Diabetic Eye Exam ______________________________  Left Eye      Exam did show retinopathy    Exam did not show retinopathy         Mild       Moderate       None       Proliferative       Severe     Right Eye     Exam did show retinopathy    Exam did not show retinopathy         Mild       Moderate       None       Proliferative       Severe     Comments __________________________________________________________    Practice Providing Exam ______________________________________________    Exam Performed By (print name) _______________________________________      Provider Signature ___________________________________________________      These reports are needed for  compliance    Please fax this completed form and a copy of the Diabetic Eye Exam report to our office located at [place] [address] as soon as possible to [phone] halley Abbott: Phone [phone]    We thank you for your assistance in treating our mutual patient

## 2021-07-03 NOTE — PROGRESS NOTES
Assessment/Plan:    1  Dysuria  -     POCT urine dip auto non-scope  -     Urine culture  -     nitrofurantoin (MACROBID) 100 mg capsule; Take 1 capsule (100 mg total) by mouth 2 (two) times a day for 5 days    2  History of diabetes mellitus  Assessment & Plan:    Lab Results   Component Value Date    HGBA1C 5 5 07/02/2021   resolved      Orders:  -     POCT hemoglobin A1c    3  BMI 32 0-32 9,adult    BMI Counseling: Body mass index is 32 06 kg/m²  The BMI is above normal  Nutrition recommendations include encouraging healthy choices of fruits and vegetables, consuming healthier snacks, moderation in carbohydrate intake, increasing intake of lean protein, reducing intake of saturated and trans fat and reducing intake of cholesterol  Exercise recommendations include exercising 3-5 times per week and strength training exercises  No pharmacotherapy was ordered  Subjective:      Patient ID: Crissy Gonzales is a 78 y o  female  Chief Complaint   Patient presents with    Difficulty Urinating     frequency , burning        HPI  C/o dysuria--frequency/burning  No f/c/rash or vaginal d/c or itchiness  UA-+leukos/+bld  Jjx8s=7 5%    The following portions of the patient's history were reviewed and updated as appropriate: allergies, current medications, past family history, past medical history, past social history, past surgical history and problem list     Review of Systems   Constitutional: Positive for fatigue  Negative for fever  Respiratory:        Hx asthma   Cardiovascular: Negative  Gastrointestinal:        Gerd   Genitourinary: Positive for dysuria  Skin: Negative for rash  Allergic/Immunologic: Positive for environmental allergies           Current Outpatient Medications   Medication Sig Dispense Refill    Advair Diskus 500-50 MCG/DOSE inhaler USE 1 INHALATION BY MOUTH  TWICE DAILY 60 blister 3    albuterol (2 5 mg/3 mL) 0 083 % nebulizer solution USE 1 VIAL IN NEBULIZER EVERY 4 HOURS AS NEEDED FOR WHEEZING DX code J44 9 225 mL 3    albuterol (PROAIR HFA) 90 mcg/act inhaler Inhale 2 puffs every 4 (four) hours as needed for wheezing 1 Inhaler 5    amLODIPine (NORVASC) 5 mg tablet TAKE 1 TABLET BY MOUTH  TWICE A  tablet 3    benzonatate (TESSALON) 200 MG capsule Take 1 capsule (200 mg total) by mouth 3 (three) times a day as needed for cough 30 capsule 1    betamethasone dipropionate (DIPROSONE) 0 05 % cream Apply topically 2 (two) times a day 30 g 5    Biotin 5000 MCG CAPS Take 2 capsules by mouth daily      CALCIUM PO Take 600 mg by mouth      cetirizine (ZyrTEC) 10 mg tablet       Cholecalciferol 1000 UNIT/10ML LIQD Take 1 g by mouth daily      diclofenac sodium (VOLTAREN) 1 % Apply 2 g topically 4 (four) times a day To affected areas right wrist 2 Tube 1    fluticasone-salmeterol (ADVAIR DISKUS) 500-50 mcg/dose inhaler Inhale 1 puff 2 (two) times a day      gabapentin (NEURONTIN) 300 mg capsule Take 1 capsule (300 mg total) by mouth daily at bedtime 30 capsule 2    ipratropium-albuterol (DUO-NEB) 0 5-2 5 mg/3 mL nebulizer solution Take 1 vial (3 mL total) by nebulization every 6 (six) hours as needed for wheezing or shortness of breath 270 mL 6    losartan (COZAAR) 100 MG tablet Take 100 mg by mouth daily       Magnesium 400 MG TABS Take by mouth      montelukast (SINGULAIR) 10 mg tablet TAKE 1 TABLET BY MOUTH  DAILY AT BEDTIME 90 tablet 3    omeprazole (PriLOSEC) 40 MG capsule TAKE 1 CAPSULE BY MOUTH  DAILY 90 capsule 3    promethazine-codeine (PHENERGAN WITH CODEINE) 6 25-10 mg/5 mL syrup Take 5 mL by mouth every 4 (four) hours as needed for cough 120 mL 0    rosuvastatin (CRESTOR) 5 mg tablet TAKE 1 TABLET BY MOUTH  DAILY 90 tablet 3    SPIRIVA RESPIMAT 2 5 MCG/ACT AERS inhaler       b complex vitamins capsule Take 1 capsule by mouth      chlorthalidone 25 mg tablet Take 25 mg by mouth daily      nitrofurantoin (MACROBID) 100 mg capsule Take 1 capsule (100 mg total) by mouth 2 (two) times a day for 5 days 10 capsule 0     No current facility-administered medications for this visit  Objective:    /70 (BP Location: Left arm, Patient Position: Sitting, Cuff Size: Large)   Pulse 88   Temp 97 8 °F (36 6 °C)   Resp 18   Ht 5' 4" (1 626 m)   Wt 84 7 kg (186 lb 12 8 oz)   BMI 32 06 kg/m²        Physical Exam  Vitals reviewed  Cardiovascular:      Rate and Rhythm: Normal rate and regular rhythm  Pulmonary:      Effort: Pulmonary effort is normal  No respiratory distress  Breath sounds: Normal breath sounds  Abdominal:      General: Bowel sounds are normal       Palpations: Abdomen is soft  Tenderness: There is abdominal tenderness (mild suprapubic)  There is no right CVA tenderness, left CVA tenderness, guarding or rebound  Musculoskeletal:      Cervical back: Neck supple  Skin:     General: Skin is warm and dry  Coloration: Skin is not jaundiced  Findings: No rash  Neurological:      Mental Status: She is oriented to person, place, and time             Jennifer Sims MD

## 2021-07-08 LAB
BACTERIA UR CULT: ABNORMAL
Lab: ABNORMAL
SL AMB ANTIMICROBIAL SUSCEPTIBILITY: ABNORMAL

## 2021-07-12 NOTE — TELEPHONE ENCOUNTER
As a follow-up, a second attempt has been made for outreach via fax, please see Contacts section for details      Thank you  Michael Benites MA

## 2021-08-12 ENCOUNTER — TELEPHONE (OUTPATIENT)
Dept: FAMILY MEDICINE CLINIC | Facility: CLINIC | Age: 76
End: 2021-08-12

## 2021-08-12 NOTE — TELEPHONE ENCOUNTER
Dr Sydni Kemp,    Patient went to see her pulmologist and he wants to put her on Bactrim  Patient thinks she is allergic to that medication  Could someone please check and let patient know

## 2021-11-11 ENCOUNTER — OFFICE VISIT (OUTPATIENT)
Dept: FAMILY MEDICINE CLINIC | Facility: CLINIC | Age: 76
End: 2021-11-11
Payer: MEDICARE

## 2021-11-11 VITALS
HEART RATE: 84 BPM | TEMPERATURE: 97.9 F | HEIGHT: 64 IN | BODY MASS INDEX: 31.92 KG/M2 | DIASTOLIC BLOOD PRESSURE: 68 MMHG | WEIGHT: 187 LBS | RESPIRATION RATE: 14 BRPM | SYSTOLIC BLOOD PRESSURE: 110 MMHG

## 2021-11-11 DIAGNOSIS — L08.9 WOUND INFECTION: ICD-10-CM

## 2021-11-11 DIAGNOSIS — R31.9 HEMATURIA, UNSPECIFIED TYPE: ICD-10-CM

## 2021-11-11 DIAGNOSIS — R35.0 URINARY FREQUENCY: Primary | ICD-10-CM

## 2021-11-11 DIAGNOSIS — T14.8XXA WOUND INFECTION: ICD-10-CM

## 2021-11-11 LAB
SL AMB  POCT GLUCOSE, UA: ABNORMAL
SL AMB LEUKOCYTE ESTERASE,UA: 500
SL AMB POCT BILIRUBIN,UA: ABNORMAL
SL AMB POCT BLOOD,UA: 50
SL AMB POCT CLARITY,UA: CLEAR
SL AMB POCT COLOR,UA: YELLOW
SL AMB POCT KETONES,UA: ABNORMAL
SL AMB POCT NITRITE,UA: ABNORMAL
SL AMB POCT PH,UA: 5
SL AMB POCT SPECIFIC GRAVITY,UA: 1.02
SL AMB POCT URINE PROTEIN: ABNORMAL
SL AMB POCT UROBILINOGEN: ABNORMAL

## 2021-11-11 PROCEDURE — 81003 URINALYSIS AUTO W/O SCOPE: CPT | Performed by: FAMILY MEDICINE

## 2021-11-11 PROCEDURE — 99214 OFFICE O/P EST MOD 30 MIN: CPT | Performed by: FAMILY MEDICINE

## 2021-11-11 RX ORDER — FLUTICASONE FUROATE, UMECLIDINIUM BROMIDE AND VILANTEROL TRIFENATATE 200; 62.5; 25 UG/1; UG/1; UG/1
POWDER RESPIRATORY (INHALATION)
COMMUNITY
Start: 2021-09-14

## 2021-11-11 RX ORDER — CEPHALEXIN 500 MG/1
500 CAPSULE ORAL EVERY 8 HOURS SCHEDULED
Qty: 21 CAPSULE | Refills: 0 | Status: SHIPPED | OUTPATIENT
Start: 2021-11-11 | End: 2021-11-18

## 2021-11-16 LAB
BACTERIA UR CULT: ABNORMAL
Lab: ABNORMAL
SL AMB ANTIMICROBIAL SUSCEPTIBILITY: ABNORMAL

## 2022-02-02 ENCOUNTER — TELEPHONE (OUTPATIENT)
Dept: FAMILY MEDICINE CLINIC | Facility: CLINIC | Age: 77
End: 2022-02-02

## 2022-02-22 ENCOUNTER — TELEMEDICINE (OUTPATIENT)
Dept: FAMILY MEDICINE CLINIC | Facility: CLINIC | Age: 77
End: 2022-02-22
Payer: MEDICARE

## 2022-02-22 VITALS — WEIGHT: 186 LBS | BODY MASS INDEX: 31.76 KG/M2 | HEIGHT: 64 IN | TEMPERATURE: 99.4 F

## 2022-02-22 DIAGNOSIS — Z78.0 POST-MENOPAUSE: ICD-10-CM

## 2022-02-22 DIAGNOSIS — Z12.12 ENCOUNTER FOR COLORECTAL CANCER SCREENING: Primary | ICD-10-CM

## 2022-02-22 DIAGNOSIS — I10 ESSENTIAL HYPERTENSION: ICD-10-CM

## 2022-02-22 DIAGNOSIS — E78.5 HYPERLIPIDEMIA, UNSPECIFIED HYPERLIPIDEMIA TYPE: ICD-10-CM

## 2022-02-22 DIAGNOSIS — J40 BRONCHITIS: ICD-10-CM

## 2022-02-22 DIAGNOSIS — Z12.11 ENCOUNTER FOR COLORECTAL CANCER SCREENING: Primary | ICD-10-CM

## 2022-02-22 PROCEDURE — 99213 OFFICE O/P EST LOW 20 MIN: CPT | Performed by: FAMILY MEDICINE

## 2022-02-22 RX ORDER — PREDNISONE 20 MG/1
20 TABLET ORAL DAILY
Qty: 7 TABLET | Refills: 0 | Status: SHIPPED | OUTPATIENT
Start: 2022-02-22 | End: 2022-03-04 | Stop reason: ALTCHOICE

## 2022-02-22 RX ORDER — AMLODIPINE BESYLATE 5 MG/1
TABLET ORAL
Qty: 180 TABLET | Refills: 3 | Status: SHIPPED | OUTPATIENT
Start: 2022-02-22

## 2022-02-22 RX ORDER — ROSUVASTATIN CALCIUM 5 MG/1
TABLET, COATED ORAL
Qty: 90 TABLET | Refills: 3 | Status: SHIPPED | OUTPATIENT
Start: 2022-02-22

## 2022-02-22 RX ORDER — AZITHROMYCIN 250 MG/1
TABLET, FILM COATED ORAL
Qty: 6 TABLET | Refills: 0 | Status: SHIPPED | OUTPATIENT
Start: 2022-02-22 | End: 2022-02-27

## 2022-02-22 RX ORDER — PROMETHAZINE HYDROCHLORIDE AND CODEINE PHOSPHATE 6.25; 1 MG/5ML; MG/5ML
5 SYRUP ORAL EVERY 6 HOURS PRN
Qty: 120 ML | Refills: 0 | Status: SHIPPED | OUTPATIENT
Start: 2022-02-22 | End: 2022-03-29

## 2022-02-23 ENCOUNTER — TELEMEDICINE (OUTPATIENT)
Dept: FAMILY MEDICINE CLINIC | Facility: CLINIC | Age: 77
End: 2022-02-23
Payer: MEDICARE

## 2022-02-23 ENCOUNTER — TELEPHONE (OUTPATIENT)
Dept: OTHER | Facility: OTHER | Age: 77
End: 2022-02-23

## 2022-02-23 VITALS — TEMPERATURE: 101.1 F

## 2022-02-23 DIAGNOSIS — R05.9 COUGH: Primary | ICD-10-CM

## 2022-02-23 PROBLEM — J15.211 PNEUMONIA DUE TO STAPHYLOCOCCUS AUREUS (HCC): Status: ACTIVE | Noted: 2022-02-23

## 2022-02-23 PROBLEM — J15.69 ACHROMOBACTER PNEUMONIA: Status: ACTIVE | Noted: 2022-02-23

## 2022-02-23 PROBLEM — J15.6 ACHROMOBACTER PNEUMONIA (HCC): Status: ACTIVE | Noted: 2022-02-23

## 2022-02-23 PROCEDURE — 99213 OFFICE O/P EST LOW 20 MIN: CPT | Performed by: FAMILY MEDICINE

## 2022-02-23 NOTE — TELEPHONE ENCOUNTER
Patient is currently at [facility] ER, paperwork states that Dr López Patient sent the patient  Dr Ban Rick is requesting a call back discuss the patient  On call provider was paged

## 2022-02-24 NOTE — TELEPHONE ENCOUNTER
Spoke w ED-Dr Toni Pérez last night-case discussed--sent pt for eval after televideo sec to concern for pneumonia and possible sepsis  Pt admitted---will cont to follow  Pt will need TCM post d/c

## 2022-02-25 DIAGNOSIS — J45.909 MODERATE ASTHMA WITHOUT COMPLICATION, UNSPECIFIED WHETHER PERSISTENT: ICD-10-CM

## 2022-02-25 RX ORDER — MONTELUKAST SODIUM 10 MG/1
TABLET ORAL
Qty: 90 TABLET | Refills: 3 | Status: SHIPPED | OUTPATIENT
Start: 2022-02-25

## 2022-02-25 NOTE — PROGRESS NOTES
Virtual Regular Visit    Verification of patient location:    Patient is located in the following state in which I hold an active license [place]      Assessment/Plan:    Problem List Items Addressed This Visit     None      Visit Diagnoses     Encounter for colorectal cancer screening    -  Primary    Relevant Orders    Cologuard    Post-menopause        Relevant Orders    DXA bone density spine hip and pelvis    Bronchitis        Relevant Medications    azithromycin (Zithromax) 250 mg tablet    predniSONE 20 mg tablet    promethazine-codeine (PHENERGAN WITH CODEINE) 6 25-10 mg/5 mL syrup      cont inh use  Maintain hydration  Virtual follow-up sched for tomorrow  Call sooner if worsening sxs prior          Reason for visit is   Chief Complaint   Patient presents with    Cough     feels like she cracked a rib , rapid home test were negative ,she took 2     Nasal Congestion    Fever     chills over the weekend     Virtual Regular Visit        Encounter provider Tawanna Mcfadden MD    Provider located at [address] [place] [id number]      Recent Visits  Date Type Provider Dept   02/23/22 [address], MD [address] [facility]   02/22/22 [address],  [facility] recent visits within past 7 days and meeting all other requirements  Future Appointments  No visits were found meeting these conditions  Showing future appointments within next 150 days and meeting all other requirements       The patient was identified by name and date of birth  Jojo Hernandez was informed that this is a telemedicine visit and that the visit is being conducted through [employer] and patient was informed that this is not a secure, HIPAA-compliant platform  She agrees to proceed     My office door was closed  No one else was in the room    She acknowledged consent and understanding of privacy and security of the video platform  The patient has agreed to participate and understands they can discontinue the visit at any time  Patient is aware this is a billable service  Subjective  Toby Noel is a 78 y o  female          HPI   C/o cough/congestion  +fever/chills/achiness  sxs began 3-4d ago over the weekend  Pt utd w all vaccs inc covid primary series and booster/flu vacc/pneum and   otc preps not helping  "ribs hurt" from coughing so hard    Past Medical History:   Diagnosis Date    Allergic rhinitis 08/25/2008    Arthritis     LAST ASSESSED: [id number]    Asthma     Asthma with acute exacerbation 06/24/2009    Asthma-COPD overlap syndrome (HCC)     Bilateral renal cysts     RESOLVED: [id number]    COPD (chronic obstructive pulmonary disease) (HCC)     Eczema     LAST ASSESSED: [id number]    Hiatal hernia     LAST ASSESSED: [id number]    Hirsutism 09/26/2011    Osteopenia of multiple sites 9/5/2012    Procedure/Onset: 05/22/2009    Pleurisy     LAST ASSESSED: [id number]    Pneumonia     LAST ASSESSED: [id number]    Umbilical hernia 20/63/9556       Past Surgical History:   Procedure Laterality Date    APPENDECTOMY      LAST ASSESSED: [id number]    CYSTECTOMY      OVARIAN    INCISIONAL BREAST BIOPSY      TONSILLECTOMY         Current Outpatient Medications   Medication Sig Dispense Refill    albuterol (2 5 mg/3 mL) 0 083 % nebulizer solution USE 1 VIAL IN NEBULIZER EVERY 4 HOURS AS NEEDED FOR WHEEZING DX code J44 9 225 mL 3    albuterol (PROAIR HFA) 90 mcg/act inhaler Inhale 2 puffs every 4 (four) hours as needed for wheezing 1 Inhaler 5    b complex vitamins capsule Take 1 capsule by mouth      betamethasone dipropionate (DIPROSONE) 0 05 % cream Apply topically 2 (two) times a day 30 g 5    Biotin 5000 MCG CAPS Take 2 capsules by mouth daily      CALCIUM PO Take 600 mg by mouth      cetirizine (ZyrTEC) 10 mg tablet       Cholecalciferol 1000 UNIT/10ML LIQD Take 1 g by mouth daily      diclofenac sodium (VOLTAREN) 1 % Apply 2 g topically 4 (four) times a day To affected areas right wrist 2 Tube 1    fluticasone-salmeterol (ADVAIR DISKUS) 500-50 mcg/dose inhaler Inhale 1 puff 2 (two) times a day        gabapentin (NEURONTIN) 300 mg capsule Take 1 capsule (300 mg total) by mouth daily at bedtime 30 capsule 2    ipratropium-albuterol (DUO-NEB) 0 5-2 5 mg/3 mL nebulizer solution Take 1 vial (3 mL total) by nebulization every 6 (six) hours as needed for wheezing or shortness of breath 270 mL 6    losartan (COZAAR) 100 MG tablet Take 100 mg by mouth daily       Magnesium 400 MG TABS Take by mouth      montelukast (SINGULAIR) 10 mg tablet TAKE 1 TABLET BY MOUTH  DAILY AT BEDTIME 90 tablet 3    mupirocin (BACTROBAN) 2 % ointment Apply topically 2 (two) times a day 22 g 0    omeprazole (PriLOSEC) 40 MG capsule TAKE 1 CAPSULE BY MOUTH  DAILY 90 capsule 3    SPIRIVA RESPIMAT 2 5 MCG/ACT AERS inhaler        Trelegy Ellipta 200-62 5-25 MCG/INH AEPB inhaler       amLODIPine (NORVASC) 5 mg tablet TAKE 1 TABLET BY MOUTH  TWICE DAILY 180 tablet 3    azithromycin (Zithromax) 250 mg tablet Take 2 tablets (500 mg total) by mouth daily for 1 day, THEN 1 tablet (250 mg total) daily for 4 days  6 tablet 0    chlorthalidone 25 mg tablet Take 25 mg by mouth daily      predniSONE 20 mg tablet Take 1 tablet (20 mg total) by mouth daily 2 tabs po x2d, then 1 tab po x2d, then 1/2 tab po x 2d--take w food 7 tablet 0    promethazine-codeine (PHENERGAN WITH CODEINE) 6 25-10 mg/5 mL syrup Take 5 mL by mouth every 6 (six) hours as needed for cough 120 mL 0    rosuvastatin (CRESTOR) 5 mg tablet TAKE 1 TABLET BY MOUTH  DAILY 90 tablet 3     No current facility-administered medications for this visit          Allergies   Allergen Reactions    Aspirin      Stomach bleeding  Stomach bleeding  Stomach bleeding    Cimetidine      Reaction Date: 25Aug2008;     Ciprofloxacin Other (See Comments)     Acid reflux  Acid reflux  Reaction Date: 22Dec2011; Annotation - [id number]: pt had cramping  tc/cma    Eggs Or Egg-Derived Products - Food Allergy Other (See Comments)    Nuts - Food Allergy Other (See Comments) and Hives     [place] nuts, hazelnut    Other Hives    Pollen Extract     Sulfamethoxazole-Trimethoprim Other (See Comments)       Review of Systems  Per hpi  Video Exam    Vitals:    02/22/22 0920   Temp: 99 4 °F (37 4 °C)   Weight: 84 4 kg (186 lb)   Height: 5' 4" (1 626 m)       Physical Exam   Looks tired, sounds congested, coughing during televisit  I spent 15 minutes directly with the patient during this visit    VIRTUAL VISIT [address] verbally agrees to participate in [facility]  Pt is aware that [facility] could be limited without vital signs or the ability to perform a full hands-on physical Lizette Fadumo understands she or the provider may request at any time to terminate the video visit and request the patient to seek care or treatment in person

## 2022-02-25 NOTE — PROGRESS NOTES
Virtual Regular Visit    Verification of patient location:    Patient is located in the following state in which I hold an active license [place]      Assessment/Plan:    Problem List Items Addressed This Visit     Cough - Primary      worsening despite tx  Ongoing fever/chills  Concern for pneumonia-possible sepsis--need for addtl testing/prob IV abx/IVF/supplemental O2  Advised ED eval--pt agreeable--will go to closest hospital t her home--[facility]           Reason for visit is   Chief Complaint   Patient presents with    Cough    Virtual Regular Visit        Encounter provider Jean Lane MD    Provider located at [address] [place] [id number]      Recent Visits  Date Type Provider Dept   02/23/22 [address], MD [address]  [facility]   02/22/22 [address] MD [address] [facility] recent visits within past 7 days and meeting all other requirements  Future Appointments  No visits were found meeting these conditions  Showing future appointments within next 150 days and meeting all other requirements       The patient was identified by name and date of birth  Liliam Banerjee was informed that this is a telemedicine visit and that the visit is being conducted through [employer] and patient was informed that this is not a secure, HIPAA-compliant platform  She agrees to proceed     My office door was closed  No one else was in the room  She acknowledged consent and understanding of privacy and security of the video platform  The patient has agreed to participate and understands they can discontinue the visit at any time  Patient is aware this is a billable service       Subjective  Liliam Banerjee is a 78 y o  female       HPI   sxs worsening on antibiotic and steroid  Still with harsh cough despite promethazine w codeine  Cont fever-Lk=260 1 today  Dec appetite, +/-nausea/chills-no v/d or rash  Past Medical History:   Diagnosis Date    Allergic rhinitis 08/25/2008    Arthritis     LAST ASSESSED: [id number]    Asthma     Asthma with acute exacerbation 06/24/2009    Asthma-COPD overlap syndrome (HCC)     Bilateral renal cysts     RESOLVED: [id number]    COPD (chronic obstructive pulmonary disease) (HCC)     Eczema     LAST ASSESSED: [id number]    Hiatal hernia     LAST ASSESSED: [id number]    Hirsutism 09/26/2011    Osteopenia of multiple sites 9/5/2012    Procedure/Onset: 05/22/2009    Pleurisy     LAST ASSESSED: [id number]    Pneumonia     LAST ASSESSED: [id number]    Umbilical hernia 56/96/0216       Past Surgical History:   Procedure Laterality Date    APPENDECTOMY      LAST ASSESSED: [id number]    CYSTECTOMY      OVARIAN    INCISIONAL BREAST BIOPSY      TONSILLECTOMY         Current Outpatient Medications   Medication Sig Dispense Refill    albuterol (2 5 mg/3 mL) 0 083 % nebulizer solution USE 1 VIAL IN NEBULIZER EVERY 4 HOURS AS NEEDED FOR WHEEZING DX code J44 9 225 mL 3    albuterol (PROAIR HFA) 90 mcg/act inhaler Inhale 2 puffs every 4 (four) hours as needed for wheezing 1 Inhaler 5    amLODIPine (NORVASC) 5 mg tablet TAKE 1 TABLET BY MOUTH  TWICE DAILY 180 tablet 3    azithromycin (Zithromax) 250 mg tablet Take 2 tablets (500 mg total) by mouth daily for 1 day, THEN 1 tablet (250 mg total) daily for 4 days   6 tablet 0    b complex vitamins capsule Take 1 capsule by mouth      betamethasone dipropionate (DIPROSONE) 0 05 % cream Apply topically 2 (two) times a day 30 g 5    Biotin 5000 MCG CAPS Take 2 capsules by mouth daily      CALCIUM PO Take 600 mg by mouth      cetirizine (ZyrTEC) 10 mg tablet       chlorthalidone 25 mg tablet Take 25 mg by mouth daily      Cholecalciferol 1000 UNIT/10ML LIQD Take 1 g by mouth daily      diclofenac sodium (VOLTAREN) 1 % Apply 2 g topically 4 (four) times a day To affected areas right wrist 2 Tube 1    fluticasone-salmeterol (ADVAIR DISKUS) 500-50 mcg/dose inhaler Inhale 1 puff 2 (two) times a day        gabapentin (NEURONTIN) 300 mg capsule Take 1 capsule (300 mg total) by mouth daily at bedtime 30 capsule 2    ipratropium-albuterol (DUO-NEB) 0 5-2 5 mg/3 mL nebulizer solution Take 1 vial (3 mL total) by nebulization every 6 (six) hours as needed for wheezing or shortness of breath 270 mL 6    losartan (COZAAR) 100 MG tablet Take 100 mg by mouth daily       Magnesium 400 MG TABS Take by mouth      montelukast (SINGULAIR) 10 mg tablet TAKE 1 TABLET BY MOUTH  DAILY AT BEDTIME 90 tablet 3    mupirocin (BACTROBAN) 2 % ointment Apply topically 2 (two) times a day 22 g 0    omeprazole (PriLOSEC) 40 MG capsule TAKE 1 CAPSULE BY MOUTH  DAILY 90 capsule 3    predniSONE 20 mg tablet Take 1 tablet (20 mg total) by mouth daily 2 tabs po x2d, then 1 tab po x2d, then 1/2 tab po x 2d--take w food 7 tablet 0    promethazine-codeine (PHENERGAN WITH CODEINE) 6 25-10 mg/5 mL syrup Take 5 mL by mouth every 6 (six) hours as needed for cough 120 mL 0    rosuvastatin (CRESTOR) 5 mg tablet TAKE 1 TABLET BY MOUTH  DAILY 90 tablet 3    SPIRIVA RESPIMAT 2 5 MCG/ACT AERS inhaler        Trelegy Ellipta 200-62 5-25 MCG/INH AEPB inhaler        No current facility-administered medications for this visit  Allergies   Allergen Reactions    Aspirin      Stomach bleeding  Stomach bleeding  Stomach bleeding    Cimetidine      Reaction Date: 25Aug2008;     Ciprofloxacin Other (See Comments)     Acid reflux  Acid reflux  Reaction Date: 22Dec2011; Annotation - [id number]: pt had cramping  tc/cma    Eggs Or Egg-Derived Products - Food Allergy Other (See Comments)    Nuts - Food Allergy Other (See Comments) and Hives     [place] nuts, hazelnut    Other Hives    Pollen Extract     Sulfamethoxazole-Trimethoprim Other (See Comments)       Review of Systems  Per hpi  Video Exam    Vitals:    02/23/22 1625   Temp: (!) 101 1 °F (38 4 °C)       Physical Exam   Looks pale, tired, coughing-+wheeze    I spent 12 minutes directly with the patient during this visit    VIRTUAL VISIT [address] verbally agrees to participate in [employer]  Pt is aware that [employer] could be limited without vital signs or the ability to perform a full hands-on physical Rita Iglesias understands she or the provider may request at any time to terminate the video visit and request the patient to seek care or treatment in person

## 2022-02-26 ENCOUNTER — TELEPHONE (OUTPATIENT)
Dept: OTHER | Facility: OTHER | Age: 77
End: 2022-02-26

## 2022-02-26 NOTE — TELEPHONE ENCOUNTER
Patient's  called today regarding a Hospital Admission in [facility], needs Medical Advise  Please call Patient's  back on Monday

## 2022-02-28 ENCOUNTER — TRANSITIONAL CARE MANAGEMENT (OUTPATIENT)
Dept: FAMILY MEDICINE CLINIC | Facility: CLINIC | Age: 77
End: 2022-02-28

## 2022-02-28 RX ORDER — AMOXICILLIN AND CLAVULANATE POTASSIUM 875; 125 MG/1; MG/1
1 TABLET, FILM COATED ORAL 2 TIMES DAILY
COMMUNITY
Start: 2022-02-28 | End: 2022-03-05

## 2022-03-04 ENCOUNTER — OFFICE VISIT (OUTPATIENT)
Dept: FAMILY MEDICINE CLINIC | Facility: CLINIC | Age: 77
End: 2022-03-04
Payer: MEDICARE

## 2022-03-04 VITALS
RESPIRATION RATE: 64 BRPM | OXYGEN SATURATION: 95 % | HEIGHT: 64 IN | BODY MASS INDEX: 33.05 KG/M2 | HEART RATE: 64 BPM | DIASTOLIC BLOOD PRESSURE: 52 MMHG | TEMPERATURE: 97.8 F | WEIGHT: 193.6 LBS | SYSTOLIC BLOOD PRESSURE: 152 MMHG

## 2022-03-04 DIAGNOSIS — Z76.89 ENCOUNTER FOR SUPPORT AND COORDINATION OF TRANSITION OF CARE: Primary | ICD-10-CM

## 2022-03-04 DIAGNOSIS — R73.03 PREDIABETES: ICD-10-CM

## 2022-03-04 DIAGNOSIS — I10 BENIGN ESSENTIAL HYPERTENSION: ICD-10-CM

## 2022-03-04 DIAGNOSIS — J18.9 MULTIFOCAL PNEUMONIA: ICD-10-CM

## 2022-03-04 DIAGNOSIS — J44.9 ASTHMA-COPD OVERLAP SYNDROME (HCC): ICD-10-CM

## 2022-03-04 LAB — SL AMB POCT HEMOGLOBIN AIC: 5.8 (ref ?–6.5)

## 2022-03-04 PROCEDURE — 83036 HEMOGLOBIN GLYCOSYLATED A1C: CPT | Performed by: FAMILY MEDICINE

## 2022-03-04 PROCEDURE — 99496 TRANSJ CARE MGMT HIGH F2F 7D: CPT | Performed by: FAMILY MEDICINE

## 2022-03-04 RX ORDER — ALBUTEROL SULFATE 90 UG/1
2 AEROSOL, METERED RESPIRATORY (INHALATION) EVERY 6 HOURS PRN
Qty: 18 G | Refills: 0 | Status: SHIPPED | OUTPATIENT
Start: 2022-03-04 | End: 2022-03-29 | Stop reason: SDUPTHER

## 2022-03-07 LAB
BASOPHILS # BLD AUTO: 0.1 X10E3/UL (ref 0–0.2)
BASOPHILS NFR BLD AUTO: 1 %
BUN SERPL-MCNC: 10 MG/DL (ref 8–27)
BUN/CREAT SERPL: 19 (ref 12–28)
CALCIUM SERPL-MCNC: 8.8 MG/DL (ref 8.7–10.3)
CHLORIDE SERPL-SCNC: 104 MMOL/L (ref 96–106)
CO2 SERPL-SCNC: 22 MMOL/L (ref 20–29)
CREAT SERPL-MCNC: 0.54 MG/DL (ref 0.57–1)
EGFR: 97 ML/MIN/1.73
EOSINOPHIL # BLD AUTO: 0.2 X10E3/UL (ref 0–0.4)
EOSINOPHIL NFR BLD AUTO: 2 %
ERYTHROCYTE [DISTWIDTH] IN BLOOD BY AUTOMATED COUNT: 11.1 % (ref 11.7–15.4)
GLUCOSE SERPL-MCNC: 101 MG/DL (ref 65–99)
HCT VFR BLD AUTO: 41.2 % (ref 34–46.6)
HGB BLD-MCNC: 14 G/DL (ref 11.1–15.9)
IMM GRANULOCYTES # BLD: 0.2 X10E3/UL (ref 0–0.1)
IMM GRANULOCYTES NFR BLD: 1 %
LYMPHOCYTES # BLD AUTO: 2.9 X10E3/UL (ref 0.7–3.1)
LYMPHOCYTES NFR BLD AUTO: 25 %
MCH RBC QN AUTO: 30.8 PG (ref 26.6–33)
MCHC RBC AUTO-ENTMCNC: 34 G/DL (ref 31.5–35.7)
MCV RBC AUTO: 91 FL (ref 79–97)
MICRODELETION SYND BLD/T FISH: NORMAL
MONOCYTES # BLD AUTO: 0.7 X10E3/UL (ref 0.1–0.9)
MONOCYTES NFR BLD AUTO: 6 %
NEUTROPHILS # BLD AUTO: 7.4 X10E3/UL (ref 1.4–7)
NEUTROPHILS NFR BLD AUTO: 65 %
PLATELET # BLD AUTO: 259 X10E3/UL (ref 150–450)
POTASSIUM SERPL-SCNC: 4.4 MMOL/L (ref 3.5–5.2)
RBC # BLD AUTO: 4.54 X10E6/UL (ref 3.77–5.28)
SODIUM SERPL-SCNC: 141 MMOL/L (ref 134–144)
WBC # BLD AUTO: 11.4 X10E3/UL (ref 3.4–10.8)

## 2022-03-17 ENCOUNTER — TELEPHONE (OUTPATIENT)
Dept: FAMILY MEDICINE CLINIC | Facility: CLINIC | Age: 77
End: 2022-03-17

## 2022-03-20 PROBLEM — R73.03 PREDIABETES: Status: ACTIVE | Noted: 2022-03-20

## 2022-03-20 PROBLEM — J18.9 MULTIFOCAL PNEUMONIA: Status: ACTIVE | Noted: 2022-02-23

## 2022-03-20 PROBLEM — Z76.89 ENCOUNTER FOR SUPPORT AND COORDINATION OF TRANSITION OF CARE: Status: ACTIVE | Noted: 2020-06-30

## 2022-03-20 PROBLEM — A41.9 SEPSIS (HCC): Status: ACTIVE | Noted: 2022-02-23

## 2022-03-20 NOTE — ASSESSMENT & PLAN NOTE
Some maintenance inh have proven cost-prohibitive to pt  Pt will address w pulm  -?qual for pt assistance program/[facility] [place] meds

## 2022-03-20 NOTE — PROGRESS NOTES
Assessment/Plan:    1  Encounter for support and coordination of transition of care    2  Multifocal pneumonia  Assessment & Plan:  Complete abx  sched pulm appt-Dr Jeff Green  Defer repeat imaging to pulm  recommendation  Orders:  -     CBC; Future  -     Basic metabolic panel; Future    3  Asthma-COPD overlap syndrome (HCC)  Assessment & Plan:  Some maintenance inh have proven cost-prohibitive to pt  Pt will address w pulm  -?qual for pt assistance program/sple meds    Orders:  -     CBC; Future  -     Basic metabolic panel; Future  -     albuterol (Proventil HFA) 90 mcg/act inhaler; Inhale 2 puffs every 6 (six) hours as needed for wheezing    4  Prediabetes  -     POCT hemoglobin A1c  -     Basic metabolic panel; Future    5  Benign essential hypertension    6  BMI 33 0-33 9,adult    BMI Counseling: Body mass index is 33 23 kg/m²  The BMI is above normal  Nutrition recommendations include encouraging healthy choices of fruits and vegetables, consuming healthier snacks, moderation in carbohydrate intake, increasing intake of lean protein, reducing intake of saturated and trans fat and reducing intake of cholesterol  Exercise recommendations include strength training exercises  No pharmacotherapy was ordered  Rationale for BMI follow-up plan is due to patient being overweight or obese  Subjective:      Patient ID: Prema De La Cruz is a 78 y o  female      Chief Complaint   Patient presents with    Transition of Care Management     pnuemonia -pt feeling alittlle better but still has cough        HPI  TCM Call (since 2/17/2022)     Date and time call was made  2/28/2022  6:19 PM    Hospital care reviewed  Records reviewed        Patient was hospitialized at  Other (comment)        Comment  [facility]    Date of Admission  02/23/22    Date of discharge  02/28/22    Diagnosis  pnuemonia bilateral, sepsis    Disposition  Home    Were the patients medications reviewed and updated  Yes    Current Symptoms Cough; Weakness; Fatigue    Cough Severity  Moderate    Weakness severity  Moderate    Fatigue severity  Moderate      TCM Call (since 2/17/2022)     Should patient be enrolled in anticoag monitoring? No    Scheduled for follow up? Yes    Referrals needed  pulminologist in [place]    Did you obtain your prescribed medications  Yes    Do you need help managing your prescriptions or medications  No    Is transportation to your appointment needed  No    I have advised the patient to call PCP with any new or worsening symptoms  Veronique fox/cma 2/28/2022    Living Arrangements  Spouse or Significiant other    Support System  Spouse    Are you recieving any outpatient services  No    Are you recieving home care services  No    Are you using any community resources  No    Current waiver services  No    Have you fallen in the last 12 months  No    Interperter language line needed  No      hospital records inc pulm and ID consults reviewed   tx w IV Zosyn and IV steroids  Transitioned to oral Augmentin at d/c  CT-+b/l pneum; CT angio-neg PE  Pt w some improvement but still w fatigue/cough, no fever    The following portions of the patient's history were reviewed and updated as appropriate: allergies, current medications, past family history, past medical history, past social history, past surgical history and problem list     Review of Systems   Constitutional: Positive for fatigue  Negative for fever  HENT: Positive for congestion, postnasal drip and rhinorrhea  Eyes:        Wears glasses   Respiratory: Positive for cough  Hx asthma   Cardiovascular: Negative  Gastrointestinal:        Gerd   Genitourinary: Negative for dysuria  Musculoskeletal: Positive for arthralgias  Skin: Negative for rash  Allergic/Immunologic: Positive for environmental allergies  Psychiatric/Behavioral: Positive for sleep disturbance  The patient is nervous/anxious            Current Outpatient Medications   Medication Sig Dispense Refill    albuterol (PROAIR HFA) 90 mcg/act inhaler Inhale 2 puffs every 4 (four) hours as needed for wheezing 1 Inhaler 5    amLODIPine (NORVASC) 5 mg tablet TAKE 1 TABLET BY MOUTH  TWICE DAILY 180 tablet 3    betamethasone dipropionate (DIPROSONE) 0 05 % cream Apply topically 2 (two) times a day 30 g 5    cetirizine (ZyrTEC) 10 mg tablet       diclofenac sodium (VOLTAREN) 1 % Apply 2 g topically 4 (four) times a day To affected areas right wrist 2 Tube 1    fluticasone-salmeterol (ADVAIR DISKUS) 500-50 mcg/dose inhaler Inhale 1 puff 2 (two) times a day        ipratropium-albuterol (DUO-NEB) 0 5-2 5 mg/3 mL nebulizer solution Take 1 vial (3 mL total) by nebulization every 6 (six) hours as needed for wheezing or shortness of breath 270 mL 6    losartan (COZAAR) 100 MG tablet Take 100 mg by mouth daily       Magnesium 400 MG TABS Take by mouth      mupirocin (BACTROBAN) 2 % ointment Apply topically 2 (two) times a day 22 g 0    omeprazole (PriLOSEC) 40 MG capsule TAKE 1 CAPSULE BY MOUTH  DAILY 90 capsule 3    promethazine-codeine (PHENERGAN WITH CODEINE) 6 25-10 mg/5 mL syrup Take 5 mL by mouth every 6 (six) hours as needed for cough 120 mL 0    rosuvastatin (CRESTOR) 5 mg tablet TAKE 1 TABLET BY MOUTH  DAILY 90 tablet 3    SPIRIVA RESPIMAT 2 5 MCG/ACT AERS inhaler        Trelegy Ellipta 200-62 5-25 MCG/INH AEPB inhaler       albuterol (Proventil HFA) 90 mcg/act inhaler Inhale 2 puffs every 6 (six) hours as needed for wheezing 18 g 0    b complex vitamins capsule Take 1 capsule by mouth (Patient not taking: Reported on 3/4/2022 )      Biotin 5000 MCG CAPS Take 2 capsules by mouth daily (Patient not taking: Reported on 3/4/2022 )      CALCIUM PO Take 600 mg by mouth (Patient not taking: Reported on 3/4/2022 )      Cholecalciferol 1000 UNIT/10ML LIQD Take 1 g by mouth daily (Patient not taking: Reported on 3/4/2022 )      clotrimazole-betamethasone (LOTRISONE) 1-0 05 % cream Apply topically 2 (two) times a day 45 g 1    montelukast (SINGULAIR) 10 mg tablet TAKE 1 TABLET BY MOUTH  DAILY AT BEDTIME (Patient not taking: Reported on 3/4/2022) 90 tablet 3     No current facility-administered medications for this visit  Objective:    /52 (BP Location: Left arm, Patient Position: Sitting, Cuff Size: Large)   Pulse 64   Temp 97 8 °F (36 6 °C)   Resp (!) 64   Ht 5' 4" (1 626 m)   Wt 87 8 kg (193 lb 9 6 oz)   SpO2 95% Comment: RA  BMI 33 23 kg/m²        Physical Exam  Vitals and nursing note reviewed  Eyes:      General: No scleral icterus  Conjunctiva/sclera: Conjunctivae normal    Cardiovascular:      Rate and Rhythm: Normal rate and regular rhythm  Pulmonary:      Effort: Pulmonary effort is normal  No respiratory distress  Comments: Few, scattered coarse rhonchi, no localization  Abdominal:      General: Bowel sounds are normal       Palpations: Abdomen is soft  Tenderness: There is no abdominal tenderness  There is no right CVA tenderness or left CVA tenderness  Musculoskeletal:      Cervical back: Neck supple  Skin:     General: Skin is warm and dry  Coloration: Skin is not jaundiced  Findings: No rash  Neurological:      General: No focal deficit present  Mental Status: She is oriented to person, place, and time  Cranial Nerves: No cranial nerve deficit     Psychiatric:         Mood and Affect: Mood normal             Gaurav Douglas MD

## 2022-03-22 ENCOUNTER — RA CDI HCC (OUTPATIENT)
Dept: OTHER | Facility: HOSPITAL | Age: 77
End: 2022-03-22

## 2022-03-22 NOTE — PROGRESS NOTES
E11 40  [facility] 75  coding opportunities          Chart Reviewed number of suggestions sent to Provider: 1     Patients Insurance     Medicare Insurance: [employer]

## 2022-03-29 ENCOUNTER — OFFICE VISIT (OUTPATIENT)
Dept: FAMILY MEDICINE CLINIC | Facility: CLINIC | Age: 77
End: 2022-03-29
Payer: MEDICARE

## 2022-03-29 VITALS
WEIGHT: 200 LBS | RESPIRATION RATE: 16 BRPM | OXYGEN SATURATION: 97 % | HEART RATE: 74 BPM | SYSTOLIC BLOOD PRESSURE: 144 MMHG | DIASTOLIC BLOOD PRESSURE: 70 MMHG | HEIGHT: 64 IN | BODY MASS INDEX: 34.15 KG/M2 | TEMPERATURE: 98.2 F

## 2022-03-29 DIAGNOSIS — Z00.00 MEDICARE ANNUAL WELLNESS VISIT, SUBSEQUENT: ICD-10-CM

## 2022-03-29 DIAGNOSIS — E78.5 HYPERLIPIDEMIA, UNSPECIFIED HYPERLIPIDEMIA TYPE: ICD-10-CM

## 2022-03-29 DIAGNOSIS — E78.5 BORDERLINE HYPERLIPIDEMIA: ICD-10-CM

## 2022-03-29 DIAGNOSIS — R53.82 CHRONIC FATIGUE: Primary | ICD-10-CM

## 2022-03-29 DIAGNOSIS — R61 CHRONIC NIGHT SWEATS: ICD-10-CM

## 2022-03-29 DIAGNOSIS — E07.89 THYROID FULLNESS: ICD-10-CM

## 2022-03-29 DIAGNOSIS — I70.90 ARTERIAL CALCIFICATION: ICD-10-CM

## 2022-03-29 DIAGNOSIS — I10 BENIGN ESSENTIAL HYPERTENSION: ICD-10-CM

## 2022-03-29 PROCEDURE — G0439 PPPS, SUBSEQ VISIT: HCPCS | Performed by: FAMILY MEDICINE

## 2022-03-29 PROCEDURE — 99214 OFFICE O/P EST MOD 30 MIN: CPT | Performed by: FAMILY MEDICINE

## 2022-03-29 NOTE — PROGRESS NOTES
Assessment and Plan:     Problem List Items Addressed This Visit     None           Preventive health issues were discussed with patient, and age appropriate screening tests were ordered as noted in patient's After Visit Summary  Personalized health advice and appropriate referrals for health education or preventive services given if needed, as noted in patient's After Visit Summary       History of Present Illness:     Patient presents for Medicare Annual Wellness visit    Patient Care Team:  Johnny Pa MD as PCP - MD Marry De Jesus MD     Problem List:     Patient Active Problem List   Diagnosis    DM type 2 with diabetic mixed hyperlipidemia ([facility] )    Borderline hyperlipidemia    Benign essential hypertension    [facility]    Gastroesophageal reflux disease with esophagitis    Hyperlipidemia    Osteopenia of multiple sites    Nonalcoholic fatty liver disease    Pulmonary nodule seen on imaging study    Suspected sleep apnea    Infected abrasion of right hand    Anterior scleritis    Senile cataract    Simple chronic bronchitis ([facility] )    Dizziness    Hypertension    Class 2 severe obesity due to excess calories with serious comorbidity in adult [facility])    Sedentary lifestyle    Mitral valve disease    Osteoporosis    Vitamin D deficiency    Sprain of right wrist    Cough    Need for hepatitis C screening test    Encounter for support and coordination of transition of care    BMI 33 0-33 9,adult    Neuropathy    Dysuria    Achromobacter pneumonia ([facility] )    Pneumonia due to Staphylococcus aureus ([facility]    Multifocal pneumonia    Sepsis ([facility] )    Prediabetes      Past Medical and Surgical History:     Past Medical History:   Diagnosis Date    Allergic rhinitis 08/25/2008    Arthritis     LAST ASSESSED: [id number]    Asthma     Asthma with acute exacerbation 06/24/2009    Asthma-COPD overlap syndrome ([facility] )     Bilateral renal cysts     RESOLVED: [id number]    COPD (chronic obstructive pulmonary disease) (HCC)     Eczema     LAST ASSESSED: [id number]    Hiatal hernia     LAST ASSESSED: [id number]    Hirsutism 09/26/2011    Osteopenia of multiple sites 9/5/2012    Procedure/Onset: 05/22/2009    Pleurisy     LAST ASSESSED: [id number]    Pneumonia     LAST ASSESSED: [id number]    Umbilical hernia 57/93/0267     Past Surgical History:   Procedure Laterality Date    APPENDECTOMY      LAST ASSESSED: [id number]    CYSTECTOMY      OVARIAN    INCISIONAL BREAST BIOPSY      TONSILLECTOMY        Family History:     Family History   Problem Relation Age of Onset    Cancer Mother     Hypertension Mother         BENIGN ESSENTIAL HYPERTENSION WITH TARGET BLOOD PRESSURE BELOW 140/90    Ovarian cancer Mother     Hypertension Father         BENIGN ESSENTIAL HYPERTENSION WITH TARGET BLOOD PRESSURE BELOW 140/90      Social History:     Social History     Socioeconomic History    Marital status: /Civil Union     Spouse name: None    Number of children: None    Years of education: None    Highest education level: None   Occupational History    Occupation: RETIRED WORKED AS    Tobacco Use    Smoking status: Former Smoker     Packs/day: 1 00     Years: 20 00     Pack years: 20 00    Smokeless tobacco: Never Used    Tobacco comment: QUIT AT THE AGE OF 50, DENIED: HISTORY OF SECOND HAND SMOKE EXPOSURE   Vaping Use    Vaping Use: Never used   Substance and Sexual Activity    Alcohol use: Yes     Comment: SOCIAL, DAILY    Drug use: No    Sexual activity: None   Other Topics Concern    None   Social History Narrative    DENIED: HISTORY OF PETS/ANIMALS    DENIED: HISTORY OF TRAVEL HISTORY     Social Determinants of Health     Financial Resource Strain: Not on file   Food Insecurity: Not on file   Transportation Needs: Not on file   Physical Activity: Not on file   Stress: Not on file   Social Connections: Not on file   Intimate Partner Violence: Not on file   Housing Stability: Not on file      Medications and Allergies:     Current Outpatient Medications   Medication Sig Dispense Refill    albuterol (PROAIR HFA) 90 mcg/act inhaler Inhale 2 puffs every 4 (four) hours as needed for wheezing 1 Inhaler 5    amLODIPine (NORVASC) 5 mg tablet TAKE 1 TABLET BY MOUTH  TWICE DAILY 180 tablet 3    b complex vitamins capsule Take 1 capsule by mouth        betamethasone dipropionate (DIPROSONE) 0 05 % cream Apply topically 2 (two) times a day 30 g 5    Biotin 5000 MCG CAPS Take 2 capsules by mouth daily        CALCIUM PO Take 600 mg by mouth        cetirizine (ZyrTEC) 10 mg tablet       Cholecalciferol 1000 UNIT/10ML LIQD Take 1 g by mouth daily        clotrimazole-betamethasone (LOTRISONE) 1-0 05 % cream Apply topically 2 (two) times a day 45 g 1    diclofenac sodium (VOLTAREN) 1 % Apply 2 g topically 4 (four) times a day To affected areas right wrist 2 Tube 1    ipratropium-albuterol (DUO-NEB) 0 5-2 5 mg/3 mL nebulizer solution Take 1 vial (3 mL total) by nebulization every 6 (six) hours as needed for wheezing or shortness of breath 270 mL 6    losartan (COZAAR) 100 MG tablet Take 100 mg by mouth daily       Magnesium 400 MG TABS Take by mouth      montelukast (SINGULAIR) 10 mg tablet TAKE 1 TABLET BY MOUTH  DAILY AT BEDTIME 90 tablet 3    mupirocin (BACTROBAN) 2 % ointment Apply topically 2 (two) times a day 22 g 0    omeprazole (PriLOSEC) 40 MG capsule TAKE 1 CAPSULE BY MOUTH  DAILY 90 capsule 3    rosuvastatin (CRESTOR) 5 mg tablet TAKE 1 TABLET BY MOUTH  DAILY 90 tablet 3    Trelegy Ellipta 200-62 5-25 MCG/INH AEPB inhaler       albuterol (Proventil HFA) 90 mcg/act inhaler Inhale 2 puffs every 6 (six) hours as needed for wheezing 18 g 0    fluticasone-salmeterol (ADVAIR DISKUS) 500-50 mcg/dose inhaler Inhale 1 puff 2 (two) times a day   (Patient not taking: Reported on 3/29/2022 )      promethazine-codeine (PHENERGAN WITH CODEINE) 6 25-10 mg/5 mL syrup Take 5 mL by mouth every 6 (six) hours as needed for cough (Patient not taking: Reported on 3/29/2022 ) 120 mL 0     No current facility-administered medications for this visit  Allergies   Allergen Reactions    Aspirin      Stomach bleeding  Stomach bleeding  Stomach bleeding    Cimetidine      Reaction Date: 25Aug2008;     Ciprofloxacin Other (See Comments)     Acid reflux  Acid reflux  Reaction Date: 22Dec2011; Annotation - [id number]: pt had cramping  tc/cma    Eggs Or Egg-Derived Products - Food Allergy Other (See Comments)    Nuts - Food Allergy Other (See Comments) and Hives     [place] nuts, hazelnut    Other Hives    Pollen Extract     Sulfamethoxazole-Trimethoprim Other (See Comments)      Immunizations:     Immunization History   Administered Date(s) Administered    COVID-19 PFIZER VACCINE 0 3 ML IM 02/21/2021, 03/14/2021, 10/26/2021    Influenza Split High Dose Preservative Free IM 10/06/2021    Influenza, recombinant, quadrivalent,injectable, preservative free 10/08/2020    Pneumococcal Conjugate 13-Valent 01/06/2016    Pneumococcal Polysaccharide PPV23 05/22/2013    Tdap 12/14/2012      Health Maintenance:         Topic Date Due    Colorectal Cancer Screening  01/18/2022    Breast Cancer Screening: Mammogram  01/25/2022    DXA SCAN  03/17/2024    Hepatitis C Screening  Completed     There are no preventive care reminders to display for this patient  Medicare Health Risk Assessment:     /70 (BP Location: Left arm, Patient Position: Sitting, Cuff Size: Large)   Pulse 74   Temp 98 2 °F (36 8 °C)   Resp 16   Ht 5' 4" (1 626 m)   Wt 90 7 kg (200 lb)   SpO2 97%   BMI 34 33 kg/m²      Juanita Brenner is here for her Subsequent Wellness visit  Last Medicare Wellness visit information reviewed, patient interviewed and updates made to the record today  Health Risk Assessment:   Patient rates overall health as good   Patient feels that their physical health rating is slightly worse  Patient is satisfied with their life  Eyesight was rated as same  Hearing was rated as same  Patient feels that their emotional and mental health rating is same  Patients states they are never, rarely angry  Patient states they are often unusually tired/fatigued  Pain experienced in the last 7 days has been none  Patient states that she has experienced no weight loss or gain in last 6 months  Depression Screening:   PHQ-2 Score: 0      Fall Risk Screening: In the past year, patient has experienced: no history of falling in past year      Urinary Incontinence Screening:   Patient has leaked urine accidently in the last six months  Home Safety:  Patient has trouble with stairs inside or outside of their home  Patient has working smoke alarms and has working carbon monoxide detector  Home safety hazards include: none  Nutrition:   Current diet is Regular and Frequent junk food  Medications:   Patient is currently taking over-the-counter supplements  OTC medications include: see medication list  Patient is able to manage medications  Activities of Daily Living (ADLs)/Instrumental Activities of Daily Living (IADLs):   Walk and transfer into and out of bed and chair?: Yes  Dress and groom yourself?: Yes    Bathe or shower yourself?: Yes    Feed yourself? Yes  Do your laundry/housekeeping?: Yes  Manage your money, pay your bills and track your expenses?: Yes  Make your own meals?: Yes    Do your own shopping?: Yes    Previous Hospitalizations:   Any hospitalizations or ED visits within the last 12 months?: Yes      Hospitalization Comments: Pneumonia     Advance Care Planning:   Living will: Yes    Durable POA for healthcare:  Yes    Advanced directive: Yes    Advanced directive counseling given: Yes    Five wishes given: Yes      Cognitive Screening:   Provider or family/friend/caregiver concerned regarding cognition?: No    PREVENTIVE SCREENINGS Cardiovascular Screening:    General: Screening Not Indicated and History Lipid Disorder      Diabetes Screening:     General: Screening Not Indicated and History Diabetes      Colorectal Cancer Screening:     General: Risks and Benefits Discussed      Breast Cancer Screening:     General: Screening Current      Cervical Cancer Screening:    General: Screening Not Indicated      Osteoporosis Screening:    General: History Osteoporosis and Risks and Benefits Discussed      Abdominal Aortic Aneurysm (AAA) Screening:        General: Risks and Benefits Discussed      Lung Cancer Screening:     General: Risks and Benefits Discussed and Screening Current      Hepatitis C Screening:    General: Screening Current    Hep C Screening Accepted: Yes      Screening, Brief Intervention, and Referral to Treatment (SBIRT)    Screening  Typical number of drinks in a day: 1  Typical number of drinks in a week: 7  Interpretation: Low risk drinking behavior  AUDIT-C Screenin) How often did you have a drink containing alcohol in the past year? monthly or less  2) How many drinks did you have on a typical day when you were drinking in the past year? 1 to 2  3) How often did you have 6 or more drinks on one occasion in the past year? never    AUDIT-C Score: 1  Interpretation: Score 0-2 (female): Negative screen for alcohol misuse    Single Item Drug Screening:  How often have you used an illegal drug (including marijuana) or a prescription medication for non-medical reasons in the past year? never    Single Item Drug Screen Score: 0  Interpretation: Negative screen for possible drug use disorder    Other Counseling Topics:   Car/seat belt/driving safety, skin self-exam, sunscreen and calcium and vitamin D intake and regular weightbearing exercise         Bismark Jones MD

## 2022-03-29 NOTE — PATIENT INSTRUCTIONS
Medicare Preventive Visit Patient Instructions  Thank you for completing your Welcome to Medicare Visit or Medicare Annual Wellness Visit today  Your next wellness visit will be due in one year (3/30/2023)  The screening/preventive services that you may require over the next 5-10 years are detailed below  Some tests may not apply to you based off risk factors and/or age  Screening tests ordered at today's visit but not completed yet may show as past due  Also, please note that scanned in results may not display below  Preventive Screenings:  Service Recommendations Previous Testing/Comments   Colorectal Cancer Screening  * Colonoscopy    * Fecal Occult Blood Test (FOBT)/Fecal Immunochemical Test (FIT)  * Fecal DNA/Cologuard Test  * Flexible Sigmoidoscopy Age: 54-65 years old   Colonoscopy: every 10 years (may be performed more frequently if at higher risk)  OR  FOBT/FIT: every 1 year  OR  Cologuard: every 3 years  OR  Sigmoidoscopy: every 5 years  Screening may be recommended earlier than age 48 if at higher risk for colorectal cancer  Also, an individualized decision between you and your healthcare provider will decide whether screening between the ages of 74-80 would be appropriate  Colonoscopy: 01/18/2012  FOBT/FIT: Not on file  Cologuard: Not on file  Sigmoidoscopy: Not on file          Breast Cancer Screening Age: 36 years old  Frequency: every 1-2 years  Not required if history of left and right mastectomy Mammogram: 01/25/2021    Screening Current   Cervical Cancer Screening Between the ages of 21-29, pap smear recommended once every 3 years  Between the ages of 33-67, can perform pap smear with HPV co-testing every 5 years     Recommendations may differ for women with a history of total hysterectomy, cervical cancer, or abnormal pap smears in past  Pap Smear: Not on file    Screening Not Indicated   Hepatitis C Screening Once for adults born between 1945 and 1965  More frequently in patients at high risk for Hepatitis C Hep C Antibody: 08/03/2020    Screening Current   Diabetes Screening 1-2 times per year if you're at risk for diabetes or have pre-diabetes Fasting glucose: No results in last 5 years   A1C: 5 8    Screening Not Indicated  History Diabetes   Cholesterol Screening Once every 5 years if you don't have a lipid disorder  May order more often based on risk factors  Lipid panel: 08/03/2020    Screening Not Indicated  History Lipid Disorder     Other Preventive Screenings Covered by Medicare:  1  Abdominal Aortic Aneurysm (AAA) Screening: covered once if your at risk  You're considered to be at risk if you have a family history of AAA  2  Lung Cancer Screening: covers low dose CT scan once per year if you meet all of the following conditions: (1) Age 50-69; (2) No signs or symptoms of lung cancer; (3) Current smoker or have quit smoking within the last 15 years; (4) You have a tobacco smoking history of at least 30 pack years (packs per day multiplied by number of years you smoked); (5) You get a written order from a healthcare provider  3  Glaucoma Screening: covered annually if you're considered high risk: (1) You have diabetes OR (2) Family history of glaucoma OR (3)  aged 48 and older OR (3)  American aged 72 and older  3  Osteoporosis Screening: covered every 2 years if you meet one of the following conditions: (1) You're estrogen deficient and at risk for osteoporosis based off medical history and other findings; (2) Have a vertebral abnormality; (3) On glucocorticoid therapy for more than 3 months; (4) Have primary hyperparathyroidism; (5) On osteoporosis medications and need to assess response to drug therapy  · Last bone density test (DXA Scan): 03/17/2022   5  HIV Screening: covered annually if you're between the age of 15-65  Also covered annually if you are younger than 13 and older than 72 with risk factors for HIV infection   For pregnant patients, it is covered up to 3 times per pregnancy  Immunizations:  Immunization Recommendations   Influenza Vaccine Annual influenza vaccination during flu season is recommended for all persons aged >= 6 months who do not have contraindications   Pneumococcal Vaccine (Prevnar and Pneumovax)  * Prevnar = PCV13  * Pneumovax = PPSV23   Adults 25-60 years old: 1-3 doses may be recommended based on certain risk factors  Adults 72 years old: Prevnar (PCV13) vaccine recommended followed by Pneumovax (PPSV23) vaccine  If already received PPSV23 since turning 65, then PCV13 recommended at least one year after PPSV23 dose  Hepatitis B Vaccine 3 dose series if at intermediate or high risk (ex: diabetes, end stage renal disease, liver disease)   Tetanus (Td) Vaccine - COST NOT COVERED BY MEDICARE PART B Following completion of primary series, a booster dose should be given every 10 years to maintain immunity against tetanus  Td may also be given as tetanus wound prophylaxis  Tdap Vaccine - COST NOT COVERED BY MEDICARE PART B Recommended at least once for all adults  For pregnant patients, recommended with each pregnancy  Shingles Vaccine (Shingrix) - COST NOT COVERED BY MEDICARE PART B  2 shot series recommended in those aged 48 and above     Health Maintenance Due:      Topic Date Due    Colorectal Cancer Screening  01/18/2022    Breast Cancer Screening: Mammogram  01/25/2022    DXA SCAN  03/17/2024    Hepatitis C Screening  Completed     Immunizations Due:  There are no preventive care reminders to display for this patient  Advance Directives   What are advance directives? Advance directives are legal documents that state your wishes and plans for medical care  These plans are made ahead of time in case you lose your ability to make decisions for yourself  Advance directives can apply to any medical decision, such as the treatments you want, and if you want to donate organs  What are the types of advance directives?   There are many types of advance directives, and each state has rules about how to use them  You may choose a combination of any of the following:  · Living will: This is a written record of the treatment you want  You can also choose which treatments you do not want, which to limit, and which to stop at a certain time  This includes surgery, medicine, IV fluid, and tube feedings  · Durable power of  for healthcare [facility]): This is a written record that states who you want to make healthcare choices for you when you are unable to make them for yourself  This person, called a proxy, is usually a family member or a friend  You may choose more than 1 proxy  · Do not resuscitate (DNR) order:  A DNR order is used in case your heart stops beating or you stop breathing  It is a request not to have certain forms of treatment, such as CPR  A DNR order may be included in other types of advance directives  · Medical directive: This covers the care that you want if you are in a coma, near death, or unable to make decisions for yourself  You can list the treatments you want for each condition  Treatment may include pain medicine, surgery, blood transfusions, dialysis, IV or tube feedings, and a ventilator (breathing machine)  · Values history: This document has questions about your views, beliefs, and how you feel and think about life  This information can help others choose the care that you would choose  Why are advance directives important? An advance directive helps you control your care  Although spoken wishes may be used, it is better to have your wishes written down  Spoken wishes can be misunderstood, or not followed  Treatments may be given even if you do not want them  An advance directive may make it easier for your family to make difficult choices about your care  Urinary Incontinence   Urinary incontinence (UI)  is when you lose control of your bladder   UI develops because your bladder cannot store or empty urine properly  The 3 most common types of UI are stress incontinence, urge incontinence, or both  Medicines:   · May be given to help strengthen your bladder control  Report any side effects of medication to your healthcare provider  Do pelvic muscle exercises often:  Your pelvic muscles help you stop urinating  Squeeze these muscles tight for 5 seconds, then relax for 5 seconds  Gradually work up to squeezing for 10 seconds  Do 3 sets of 15 repetitions a day, or as directed  This will help strengthen your pelvic muscles and improve bladder control  Train your bladder:  Go to the bathroom at set times, such as every 2 hours, even if you do not feel the urge to go  You can also try to hold your urine when you feel the urge to go  For example, hold your urine for 5 minutes when you feel the urge to go  As that becomes easier, hold your urine for 10 minutes  Self-care:   · Keep a UI record  Write down how often you leak urine and how much you leak  Make a note of what you were doing when you leaked urine  · Drink liquids as directed  You may need to limit the amount of liquid you drink to help control your urine leakage  Do not drink any liquid right before you go to bed  Limit or do not have drinks that contain caffeine or alcohol  · Prevent constipation  Eat a variety of high-fiber foods  Good examples are high-fiber cereals, beans, vegetables, and whole-grain breads  Walking is the best way to trigger your intestines to have a bowel movement  · Exercise regularly and maintain a healthy weight  Weight loss and exercise will decrease pressure on your bladder and help you control your leakage  · Use a catheter as directed  to help empty your bladder  A catheter is a tiny, plastic tube that is put into your bladder to drain your urine  · Go to behavior therapy as directed  Behavior therapy may be used to help you learn to control your urge to urinate      Weight Management   Why it is important to manage your weight:  Being overweight increases your risk of health conditions such as heart disease, high blood pressure, type 2 diabetes, and certain types of cancer  It can also increase your risk for osteoarthritis, sleep apnea, and other respiratory problems  Aim for a slow, steady weight loss  Even a small amount of weight loss can lower your risk of health problems  How to lose weight safely:  A safe and healthy way to lose weight is to eat fewer calories and get regular exercise  You can lose up about 1 pound a week by decreasing the number of calories you eat by 500 calories each day  Healthy meal plan for weight management:  A healthy meal plan includes a variety of foods, contains fewer calories, and helps you stay healthy  A healthy meal plan includes the following:  · Eat whole-grain foods more often  A healthy meal plan should contain fiber  Fiber is the part of grains, fruits, and vegetables that is not broken down by your body  Whole-grain foods are healthy and provide extra fiber in your diet  Some examples of whole-grain foods are whole-wheat breads and pastas, oatmeal, brown rice, and bulgur  · Eat a variety of vegetables every day  Include dark, leafy greens such as spinach, kale, angela greens, and mustard greens  Eat yellow and orange vegetables such as carrots, sweet potatoes, and winter squash  · Eat a variety of fruits every day  Choose fresh or canned fruit (canned in its own juice or light syrup) instead of juice  Fruit juice has very little or no fiber  · Eat low-fat dairy foods  Drink fat-free (skim) milk or 1% milk  Eat fat-free yogurt and low-fat cottage cheese  Try low-fat cheeses such as mozzarella and other reduced-fat cheeses  · Choose meat and other protein foods that are low in fat  Choose beans or other legumes such as split peas or lentils  Choose fish, skinless poultry (chicken or turkey), or lean cuts of red meat (beef or pork)   Before you cook meat or poultry, cut off any visible fat  · Use less fat and oil  Try baking foods instead of frying them  Add less fat, such as margarine, sour cream, regular salad dressing and mayonnaise to foods  Eat fewer high-fat foods  Some examples of high-fat foods include french fries, doughnuts, ice cream, and cakes  · Eat fewer sweets  Limit foods and drinks that are high in sugar  This includes candy, cookies, regular soda, and sweetened drinks  Exercise:  Exercise at least 30 minutes per day on most days of the week  Some examples of exercise include walking, biking, dancing, and swimming  You can also fit in more physical activity by taking the stairs instead of the elevator or parking farther away from stores  Ask your healthcare provider about the best exercise plan for you  © Copyright [employer] 2018 Information is for End User's use only and may not be sold, redistributed or otherwise used for commercial purposes   All illustrations and images included in CareNotes® are the copyrighted property of [employer]  or [address] [place] [address]

## 2022-03-30 LAB
BASOPHILS # BLD AUTO: 0.1 X10E3/UL (ref 0–0.2)
BASOPHILS NFR BLD AUTO: 1 %
CHOLEST SERPL-MCNC: 205 MG/DL (ref 100–199)
EOSINOPHIL # BLD AUTO: 0.2 X10E3/UL (ref 0–0.4)
EOSINOPHIL NFR BLD AUTO: 3 %
ERYTHROCYTE [DISTWIDTH] IN BLOOD BY AUTOMATED COUNT: 11.8 % (ref 11.7–15.4)
HCT VFR BLD AUTO: 41.1 % (ref 34–46.6)
HDLC SERPL-MCNC: 61 MG/DL
HGB BLD-MCNC: 14.2 G/DL (ref 11.1–15.9)
IMM GRANULOCYTES # BLD: 0 X10E3/UL (ref 0–0.1)
IMM GRANULOCYTES NFR BLD: 1 %
LDLC SERPL CALC-MCNC: 122 MG/DL (ref 0–99)
LYMPHOCYTES # BLD AUTO: 2.5 X10E3/UL (ref 0.7–3.1)
LYMPHOCYTES NFR BLD AUTO: 39 %
MCH RBC QN AUTO: 30.9 PG (ref 26.6–33)
MCHC RBC AUTO-ENTMCNC: 34.5 G/DL (ref 31.5–35.7)
MCV RBC AUTO: 90 FL (ref 79–97)
MICRODELETION SYND BLD/T FISH: NORMAL
MONOCYTES # BLD AUTO: 0.4 X10E3/UL (ref 0.1–0.9)
MONOCYTES NFR BLD AUTO: 7 %
NEUTROPHILS # BLD AUTO: 3.1 X10E3/UL (ref 1.4–7)
NEUTROPHILS NFR BLD AUTO: 49 %
PLATELET # BLD AUTO: 251 X10E3/UL (ref 150–450)
RBC # BLD AUTO: 4.59 X10E6/UL (ref 3.77–5.28)
T3FREE SERPL-MCNC: 3.3 PG/ML (ref 2–4.4)
T4 FREE SERPL-MCNC: 1.18 NG/DL (ref 0.82–1.77)
TRIGL SERPL-MCNC: 125 MG/DL (ref 0–149)
TSH SERPL DL<=0.005 MIU/L-ACNC: 2.44 UIU/ML (ref 0.45–4.5)
WBC # BLD AUTO: 6.3 X10E3/UL (ref 3.4–10.8)

## 2022-03-31 ENCOUNTER — TELEPHONE (OUTPATIENT)
Dept: FAMILY MEDICINE CLINIC | Facility: CLINIC | Age: 77
End: 2022-03-31

## 2022-03-31 NOTE — TELEPHONE ENCOUNTER
----- Message from Jaquan Conrad MD sent at 3/31/2022 10:44 AM EDT -----  Please inform cbc back into normal-remainder labs look good as well--cholesterol borderline high-

## 2022-04-03 PROBLEM — R53.82 CHRONIC FATIGUE: Status: ACTIVE | Noted: 2022-04-03

## 2022-04-03 PROBLEM — R61 CHRONIC NIGHT SWEATS: Status: ACTIVE | Noted: 2022-04-03

## 2022-04-03 PROBLEM — E07.89 THYROID FULLNESS: Status: ACTIVE | Noted: 2022-04-03

## 2022-04-03 PROBLEM — I70.90 ARTERIAL CALCIFICATION: Status: ACTIVE | Noted: 2022-04-03

## 2022-04-03 NOTE — PROGRESS NOTES
Assessment/Plan:    1  Chronic fatigue  -     CBC; Future  -     Lipid Panel with Direct LDL reflex; Future  -     Ambulatory Referral to Cardiology; Future    2  Chronic night sweats  -     CBC; Future  -     Lipid Panel with Direct LDL reflex; Future  -     Ambulatory Referral to Cardiology; Future    3  Thyroid fullness  -     TSH, 3rd generation; Future  -     T3, free; Future  -     T4, free; Future  -     US thyroid; Future; Expected date: 03/29/2022    4  Hyperlipidemia, unspecified hyperlipidemia type  -     Lipid Panel with Direct LDL reflex; Future  -     Ambulatory Referral to Cardiology; Future    5  Arterial calcification  -     Ambulatory Referral to Cardiology; Future    6  Borderline hyperlipidemia  -     Ambulatory Referral to Cardiology; Future    7  Benign essential hypertension  -     Ambulatory Referral to Cardiology; Future    8  Medicare annual wellness visit, subsequent            Patient Instructions       Medicare Preventive Visit Patient Instructions  Thank you for completing your Welcome to Medicare Visit or Medicare Annual Wellness Visit today  Your next wellness visit will be due in one year (3/30/2023)  The screening/preventive services that you may require over the next 5-10 years are detailed below  Some tests may not apply to you based off risk factors and/or age  Screening tests ordered at today's visit but not completed yet may show as past due  Also, please note that scanned in results may not display below    Preventive Screenings:  Service Recommendations Previous Testing/Comments   Colorectal Cancer Screening  * Colonoscopy    * Fecal Occult Blood Test (FOBT)/Fecal Immunochemical Test (FIT)  * Fecal DNA/Cologuard Test  * Flexible Sigmoidoscopy Age: 54-65 years old   Colonoscopy: every 10 years (may be performed more frequently if at higher risk)  OR  FOBT/FIT: every 1 year  OR  Cologuard: every 3 years  OR  Sigmoidoscopy: every 5 years  Screening may be recommended earlier than age 48 if at higher risk for colorectal cancer  Also, an individualized decision between you and your healthcare provider will decide whether screening between the ages of 74-80 would be appropriate  Colonoscopy: 01/18/2012  FOBT/FIT: Not on file  Cologuard: Not on file  Sigmoidoscopy: Not on file          Breast Cancer Screening Age: 36 years old  Frequency: every 1-2 years  Not required if history of left and right mastectomy Mammogram: 01/25/2021    Screening Current   Cervical Cancer Screening Between the ages of 21-29, pap smear recommended once every 3 years  Between the ages of 33-67, can perform pap smear with HPV co-testing every 5 years  Recommendations may differ for women with a history of total hysterectomy, cervical cancer, or abnormal pap smears in past  Pap Smear: Not on file    Screening Not Indicated   Hepatitis C Screening Once for adults born between 1945 and 1965  More frequently in patients at high risk for Hepatitis C Hep C Antibody: 08/03/2020    Screening Current   Diabetes Screening 1-2 times per year if you're at risk for diabetes or have pre-diabetes Fasting glucose: No results in last 5 years   A1C: 5 8    Screening Not Indicated  History Diabetes   Cholesterol Screening Once every 5 years if you don't have a lipid disorder  May order more often based on risk factors  Lipid panel: 08/03/2020    Screening Not Indicated  History Lipid Disorder     Other Preventive Screenings Covered by Medicare:  1  Abdominal Aortic Aneurysm (AAA) Screening: covered once if your at risk  You're considered to be at risk if you have a family history of AAA    2  Lung Cancer Screening: covers low dose CT scan once per year if you meet all of the following conditions: (1) Age 50-69; (2) No signs or symptoms of lung cancer; (3) Current smoker or have quit smoking within the last 15 years; (4) You have a tobacco smoking history of at least 30 pack years (packs per day multiplied by number of years you smoked); (5) You get a written order from a healthcare provider  3  Glaucoma Screening: covered annually if you're considered high risk: (1) You have diabetes OR (2) Family history of glaucoma OR (3)  aged 48 and older OR (3)  American aged 72 and older  3  Osteoporosis Screening: covered every 2 years if you meet one of the following conditions: (1) You're estrogen deficient and at risk for osteoporosis based off medical history and other findings; (2) Have a vertebral abnormality; (3) On glucocorticoid therapy for more than 3 months; (4) Have primary hyperparathyroidism; (5) On osteoporosis medications and need to assess response to drug therapy  · Last bone density test (DXA Scan): 03/17/2022   5  HIV Screening: covered annually if you're between the age of 15-65  Also covered annually if you are younger than 13 and older than 72 with risk factors for HIV infection  For pregnant patients, it is covered up to 3 times per pregnancy  Immunizations:  Immunization Recommendations   Influenza Vaccine Annual influenza vaccination during flu season is recommended for all persons aged >= 6 months who do not have contraindications   Pneumococcal Vaccine (Prevnar and Pneumovax)  * Prevnar = PCV13  * Pneumovax = PPSV23   Adults 25-60 years old: 1-3 doses may be recommended based on certain risk factors  Adults 72 years old: Prevnar (PCV13) vaccine recommended followed by Pneumovax (PPSV23) vaccine  If already received PPSV23 since turning 65, then PCV13 recommended at least one year after PPSV23 dose  Hepatitis B Vaccine 3 dose series if at intermediate or high risk (ex: diabetes, end stage renal disease, liver disease)   Tetanus (Td) Vaccine - COST NOT COVERED BY MEDICARE PART B Following completion of primary series, a booster dose should be given every 10 years to maintain immunity against tetanus  Td may also be given as tetanus wound prophylaxis     Tdap Vaccine - COST NOT COVERED BY MEDICARE PART B Recommended at least once for all adults  For pregnant patients, recommended with each pregnancy  Shingles Vaccine (Shingrix) - COST NOT COVERED BY MEDICARE PART B  2 shot series recommended in those aged 48 and above     Health Maintenance Due:      Topic Date Due    Colorectal Cancer Screening  01/18/2022    Breast Cancer Screening: Mammogram  01/25/2022    DXA SCAN  03/17/2024    Hepatitis C Screening  Completed     Immunizations Due:  There are no preventive care reminders to display for this patient  Advance Directives   What are advance directives? Advance directives are legal documents that state your wishes and plans for medical care  These plans are made ahead of time in case you lose your ability to make decisions for yourself  Advance directives can apply to any medical decision, such as the treatments you want, and if you want to donate organs  What are the types of advance directives? There are many types of advance directives, and each state has rules about how to use them  You may choose a combination of any of the following:  · Living will: This is a written record of the treatment you want  You can also choose which treatments you do not want, which to limit, and which to stop at a certain time  This includes surgery, medicine, IV fluid, and tube feedings  · Durable power of  for healthcare [place] [facility]): This is a written record that states who you want to make healthcare choices for you when you are unable to make them for yourself  This person, called a proxy, is usually a family member or a friend  You may choose more than 1 proxy  · Do not resuscitate (DNR) order:  A DNR order is used in case your heart stops beating or you stop breathing  It is a request not to have certain forms of treatment, such as CPR  A DNR order may be included in other types of advance directives  · Medical directive:   This covers the care that you want if you are in a coma, near death, or unable to make decisions for yourself  You can list the treatments you want for each condition  Treatment may include pain medicine, surgery, blood transfusions, dialysis, IV or tube feedings, and a ventilator (breathing machine)  · Values history: This document has questions about your views, beliefs, and how you feel and think about life  This information can help others choose the care that you would choose  Why are advance directives important? An advance directive helps you control your care  Although spoken wishes may be used, it is better to have your wishes written down  Spoken wishes can be misunderstood, or not followed  Treatments may be given even if you do not want them  An advance directive may make it easier for your family to make difficult choices about your care  Urinary Incontinence   Urinary incontinence (UI)  is when you lose control of your bladder  UI develops because your bladder cannot store or empty urine properly  The 3 most common types of UI are stress incontinence, urge incontinence, or both  Medicines:   · May be given to help strengthen your bladder control  Report any side effects of medication to your healthcare provider  Do pelvic muscle exercises often:  Your pelvic muscles help you stop urinating  Squeeze these muscles tight for 5 seconds, then relax for 5 seconds  Gradually work up to squeezing for 10 seconds  Do 3 sets of 15 repetitions a day, or as directed  This will help strengthen your pelvic muscles and improve bladder control  Train your bladder:  Go to the bathroom at set times, such as every 2 hours, even if you do not feel the urge to go  You can also try to hold your urine when you feel the urge to go  For example, hold your urine for 5 minutes when you feel the urge to go  As that becomes easier, hold your urine for 10 minutes  Self-care:   · Keep a UI record  Write down how often you leak urine and how much you leak   Make a note of what you were doing when you leaked urine  · Drink liquids as directed  You may need to limit the amount of liquid you drink to help control your urine leakage  Do not drink any liquid right before you go to bed  Limit or do not have drinks that contain caffeine or alcohol  · Prevent constipation  Eat a variety of high-fiber foods  Good examples are high-fiber cereals, beans, vegetables, and whole-grain breads  Walking is the best way to trigger your intestines to have a bowel movement  · Exercise regularly and maintain a healthy weight  Weight loss and exercise will decrease pressure on your bladder and help you control your leakage  · Use a catheter as directed  to help empty your bladder  A catheter is a tiny, plastic tube that is put into your bladder to drain your urine  · Go to behavior therapy as directed  Behavior therapy may be used to help you learn to control your urge to urinate  Weight Management   Why it is important to manage your weight:  Being overweight increases your risk of health conditions such as heart disease, high blood pressure, type 2 diabetes, and certain types of cancer  It can also increase your risk for osteoarthritis, sleep apnea, and other respiratory problems  Aim for a slow, steady weight loss  Even a small amount of weight loss can lower your risk of health problems  How to lose weight safely:  A safe and healthy way to lose weight is to eat fewer calories and get regular exercise  You can lose up about 1 pound a week by decreasing the number of calories you eat by 500 calories each day  Healthy meal plan for weight management:  A healthy meal plan includes a variety of foods, contains fewer calories, and helps you stay healthy  A healthy meal plan includes the following:  · Eat whole-grain foods more often  A healthy meal plan should contain fiber  Fiber is the part of grains, fruits, and vegetables that is not broken down by your body   Whole-grain foods are healthy and provide extra fiber in your diet  Some examples of whole-grain foods are whole-wheat breads and pastas, oatmeal, brown rice, and bulgur  · Eat a variety of vegetables every day  Include dark, leafy greens such as spinach, kale, angela greens, and mustard greens  Eat yellow and orange vegetables such as carrots, sweet potatoes, and winter squash  · Eat a variety of fruits every day  Choose fresh or canned fruit (canned in its own juice or light syrup) instead of juice  Fruit juice has very little or no fiber  · Eat low-fat dairy foods  Drink fat-free (skim) milk or 1% milk  Eat fat-free yogurt and low-fat cottage cheese  Try low-fat cheeses such as mozzarella and other reduced-fat cheeses  · Choose meat and other protein foods that are low in fat  Choose beans or other legumes such as split peas or lentils  Choose fish, skinless poultry (chicken or turkey), or lean cuts of red meat (beef or pork)  Before you cook meat or poultry, cut off any visible fat  · Use less fat and oil  Try baking foods instead of frying them  Add less fat, such as margarine, sour cream, regular salad dressing and mayonnaise to foods  Eat fewer high-fat foods  Some examples of high-fat foods include french fries, doughnuts, ice cream, and cakes  · Eat fewer sweets  Limit foods and drinks that are high in sugar  This includes candy, cookies, regular soda, and sweetened drinks  Exercise:  Exercise at least 30 minutes per day on most days of the week  Some examples of exercise include walking, biking, dancing, and swimming  You can also fit in more physical activity by taking the stairs instead of the elevator or parking farther away from stores  Ask your healthcare provider about the best exercise plan for you  © Copyright [employer] 2018 Information is for End User's use only and may not be sold, redistributed or otherwise used for commercial purposes   All illustrations and images included in CareNotes® are the copyrighted property of A D A M [employer] Inc  or [facility]        Subjective:      Patient ID: Medina Lamb is a 78 y o  female  Chief Complaint   Patient presents with    Medicare Wellness Visit     discuss hot flashes     Alopecia    Medication Refill     needs written scripts, review results        HPI  Follow-up  Interval hx inc pulm consult reviewed  +Improvement w breathing since last visit  Ongoing fatigue, +hair loss, diff w wgt  Due for recheck labs    The following portions of the patient's history were reviewed and updated as appropriate: allergies, current medications, past family history, past medical history, past social history, past surgical history and problem list     Review of Systems   Constitutional: Positive for fatigue  Negative for fever  Respiratory: Positive for wheezing (intermittent)  TIERNEY   Cardiovascular: Negative for chest pain  Musculoskeletal: Positive for arthralgias  Allergic/Immunologic: Positive for environmental allergies  Neurological: Negative  Psychiatric/Behavioral: Positive for sleep disturbance           Current Outpatient Medications   Medication Sig Dispense Refill    albuterol (PROAIR HFA) 90 mcg/act inhaler Inhale 2 puffs every 4 (four) hours as needed for wheezing 1 Inhaler 5    amLODIPine (NORVASC) 5 mg tablet TAKE 1 TABLET BY MOUTH  TWICE DAILY 180 tablet 3    b complex vitamins capsule Take 1 capsule by mouth        betamethasone dipropionate (DIPROSONE) 0 05 % cream Apply topically 2 (two) times a day 30 g 5    Biotin 5000 MCG CAPS Take 2 capsules by mouth daily        CALCIUM PO Take 600 mg by mouth        cetirizine (ZyrTEC) 10 mg tablet       Cholecalciferol 1000 UNIT/10ML LIQD Take 1 g by mouth daily        clotrimazole-betamethasone (LOTRISONE) 1-0 05 % cream Apply topically 2 (two) times a day 45 g 1    diclofenac sodium (VOLTAREN) 1 % Apply 2 g topically 4 (four) times a day To affected areas right wrist 2 Tube 1    ipratropium-albuterol (DUO-NEB) 0 5-2 5 mg/3 mL nebulizer solution Take 1 vial (3 mL total) by nebulization every 6 (six) hours as needed for wheezing or shortness of breath 270 mL 6    losartan (COZAAR) 100 MG tablet Take 100 mg by mouth daily       Magnesium 400 MG TABS Take by mouth      montelukast (SINGULAIR) 10 mg tablet TAKE 1 TABLET BY MOUTH  DAILY AT BEDTIME 90 tablet 3    mupirocin (BACTROBAN) 2 % ointment Apply topically 2 (two) times a day 22 g 0    omeprazole (PriLOSEC) 40 MG capsule TAKE 1 CAPSULE BY MOUTH  DAILY 90 capsule 3    rosuvastatin (CRESTOR) 5 mg tablet TAKE 1 TABLET BY MOUTH  DAILY 90 tablet 3    Trelegy Ellipta 200-62 5-25 MCG/INH AEPB inhaler        No current facility-administered medications for this visit  Objective:    /70 (BP Location: Left arm, Patient Position: Sitting, Cuff Size: Large)   Pulse 74   Temp 98 2 °F (36 8 °C)   Resp 16   Ht 5' 4" (1 626 m)   Wt 90 7 kg (200 lb)   SpO2 97%   BMI 34 33 kg/m²        Physical Exam  Vitals and nursing note reviewed  Constitutional:       General: She is not in acute distress  Cardiovascular:      Rate and Rhythm: Normal rate and regular rhythm  Pulmonary:      Effort: Pulmonary effort is normal  No respiratory distress  Comments: Few, scattered coarse rhonchi, no localization  Abdominal:      General: Bowel sounds are normal       Palpations: Abdomen is soft  Tenderness: There is no abdominal tenderness  There is no right CVA tenderness or left CVA tenderness  Musculoskeletal:         General: Tenderness present  Cervical back: Neck supple  Skin:     General: Skin is warm and dry  Coloration: Skin is not jaundiced  Findings: No rash  Neurological:      General: No focal deficit present  Mental Status: She is alert and oriented to person, place, and time  Cranial Nerves: No cranial nerve deficit     Psychiatric:         Mood and Affect: Mood normal               Moo Messina Stanford Castaneda MD

## 2022-04-26 ENCOUNTER — TELEPHONE (OUTPATIENT)
Dept: FAMILY MEDICINE CLINIC | Facility: CLINIC | Age: 77
End: 2022-04-26

## 2022-04-26 LAB — COLOGUARD RESULT REPORTABLE: POSITIVE

## 2022-04-26 NOTE — TELEPHONE ENCOUNTER
Advised patient , she complained about all her dr nicole and asked if she could put this off , I told her no this was important to look into this soon so she said she will call Dr Giana acotsa/charli----- Message from Ba Gonsalez MD sent at 4/26/2022  1:49 PM EDT -----  Please inform Cologuard POSITIVE-needs to see GI for colonoscopy-  Please check if pt has preference for GI and I will enter ref -thanks

## 2022-05-18 ENCOUNTER — OFFICE VISIT (OUTPATIENT)
Dept: FAMILY MEDICINE CLINIC | Facility: CLINIC | Age: 77
End: 2022-05-18
Payer: MEDICARE

## 2022-05-18 VITALS
SYSTOLIC BLOOD PRESSURE: 144 MMHG | HEIGHT: 64 IN | TEMPERATURE: 97.7 F | WEIGHT: 203.2 LBS | DIASTOLIC BLOOD PRESSURE: 62 MMHG | BODY MASS INDEX: 34.69 KG/M2 | HEART RATE: 80 BPM | RESPIRATION RATE: 18 BRPM

## 2022-05-18 DIAGNOSIS — R06.2 WHEEZE: ICD-10-CM

## 2022-05-18 DIAGNOSIS — M25.562 LEFT KNEE PAIN, UNSPECIFIED CHRONICITY: Primary | ICD-10-CM

## 2022-05-18 DIAGNOSIS — J45.909 UNCOMPLICATED ASTHMA, UNSPECIFIED ASTHMA SEVERITY, UNSPECIFIED WHETHER PERSISTENT: ICD-10-CM

## 2022-05-18 PROCEDURE — 99213 OFFICE O/P EST LOW 20 MIN: CPT | Performed by: FAMILY MEDICINE

## 2022-05-18 RX ORDER — IPRATROPIUM BROMIDE AND ALBUTEROL SULFATE 2.5; .5 MG/3ML; MG/3ML
3 SOLUTION RESPIRATORY (INHALATION) EVERY 6 HOURS PRN
Qty: 270 ML | Refills: 6 | Status: SHIPPED | OUTPATIENT
Start: 2022-05-18

## 2022-05-18 NOTE — PROGRESS NOTES
Assessment/Plan:  Diagnoses and all orders for this visit:    Left knee pain, unspecified chronicity  Left knee pain  otc Voltaren gel 3-4x daily   otc tylenol  rec imaging and ortho/pt follow-up      Subjective:      Patient ID: Carisa Henderson is a 78 y o  female  Chief Complaint   Patient presents with    Knee Pain     Left knee pain Started Sunday ,pain level 8 hurts more when she gets up from the sitting position , no injury        Knee Pain   There was no injury mechanism  The pain is present in the left knee  The quality of the pain is described as shooting  The pain is moderate  The pain has been worsening since onset  She reports no foreign bodies present  The symptoms are aggravated by movement  She has tried rest, acetaminophen and NSAIDs for the symptoms         The following portions of the patient's history were reviewed and updated as appropriate: allergies, current medications, past family history, past medical history, past social history, past surgical history and problem list     Review of Systems    Per hpi    Current Outpatient Medications   Medication Sig Dispense Refill    albuterol (PROAIR HFA) 90 mcg/act inhaler Inhale 2 puffs every 4 (four) hours as needed for wheezing 1 Inhaler 5    amLODIPine (NORVASC) 5 mg tablet TAKE 1 TABLET BY MOUTH  TWICE DAILY 180 tablet 3    b complex vitamins capsule Take 1 capsule by mouth        betamethasone dipropionate (DIPROSONE) 0 05 % cream Apply topically 2 (two) times a day 30 g 5    Biotin 5000 MCG CAPS Take 2 capsules by mouth daily        CALCIUM PO Take 600 mg by mouth        cetirizine (ZyrTEC) 10 mg tablet       Cholecalciferol 1000 UNIT/10ML LIQD Take 1 g by mouth daily        clotrimazole-betamethasone (LOTRISONE) 1-0 05 % cream Apply topically 2 (two) times a day 45 g 1    diclofenac sodium (VOLTAREN) 1 % Apply 2 g topically 4 (four) times a day To affected areas right wrist 2 Tube 1    losartan (COZAAR) 100 MG tablet Take 100 mg by mouth daily       Magnesium 400 MG TABS Take by mouth      montelukast (SINGULAIR) 10 mg tablet TAKE 1 TABLET BY MOUTH  DAILY AT BEDTIME 90 tablet 3    mupirocin (BACTROBAN) 2 % ointment Apply topically 2 (two) times a day 22 g 0    omeprazole (PriLOSEC) 40 MG capsule TAKE 1 CAPSULE BY MOUTH  DAILY 90 capsule 3    rosuvastatin (CRESTOR) 5 mg tablet TAKE 1 TABLET BY MOUTH  DAILY 90 tablet 3    Trelegy Ellipta 200-62 5-25 MCG/INH AEPB inhaler       ipratropium-albuterol (DUO-NEB) 0 5-2 5 mg/3 mL nebulizer solution Take 3 mL by nebulization every 6 (six) hours as needed for wheezing or shortness of breath 270 mL 6     No current facility-administered medications for this visit  Objective:    /62 (BP Location: Left arm, Patient Position: Sitting, Cuff Size: Large)   Pulse 80   Temp 97 7 °F (36 5 °C)   Resp 18   Ht 5' 4" (1 626 m)   Wt 92 2 kg (203 lb 3 2 oz)   BMI 34 88 kg/m²        Physical Exam  Vitals and nursing note reviewed  Constitutional:       Comments: OW   Cardiovascular:      Rate and Rhythm: Normal rate and regular rhythm  Pulmonary:      Effort: Pulmonary effort is normal  No respiratory distress  Musculoskeletal:         General: Tenderness (left knee w restricted ROM) present  Skin:     General: Skin is warm and dry  Findings: No erythema or rash  Neurological:      Mental Status: She is alert and oriented to person, place, and time     Psychiatric:         Mood and Affect: Mood normal          Bismark Jones MD

## 2022-06-05 PROBLEM — M25.562 LEFT KNEE PAIN: Status: ACTIVE | Noted: 2022-06-05

## 2022-10-11 PROBLEM — Z00.00 MEDICARE ANNUAL WELLNESS VISIT, SUBSEQUENT: Status: RESOLVED | Noted: 2020-06-30 | Resolved: 2022-10-11

## 2022-10-11 PROBLEM — A41.9 SEPSIS (HCC): Status: RESOLVED | Noted: 2022-02-23 | Resolved: 2022-10-11

## 2022-10-11 PROBLEM — J15.6 ACHROMOBACTER PNEUMONIA (HCC): Status: RESOLVED | Noted: 2022-02-23 | Resolved: 2022-10-11

## 2022-10-11 PROBLEM — J15.69 ACHROMOBACTER PNEUMONIA: Status: RESOLVED | Noted: 2022-02-23 | Resolved: 2022-10-11

## 2022-10-11 PROBLEM — J18.9 MULTIFOCAL PNEUMONIA: Status: RESOLVED | Noted: 2022-02-23 | Resolved: 2022-10-11

## 2022-10-11 PROBLEM — J15.211 PNEUMONIA DUE TO STAPHYLOCOCCUS AUREUS (HCC): Status: RESOLVED | Noted: 2022-02-23 | Resolved: 2022-10-11

## 2022-12-28 ENCOUNTER — TELEPHONE (OUTPATIENT)
Dept: FAMILY MEDICINE CLINIC | Facility: CLINIC | Age: 77
End: 2022-12-28

## 2022-12-28 DIAGNOSIS — J45.909 UNCOMPLICATED ASTHMA, UNSPECIFIED ASTHMA SEVERITY, UNSPECIFIED WHETHER PERSISTENT: ICD-10-CM

## 2022-12-28 DIAGNOSIS — R06.2 WHEEZE: ICD-10-CM

## 2022-12-28 RX ORDER — IPRATROPIUM BROMIDE AND ALBUTEROL SULFATE 2.5; .5 MG/3ML; MG/3ML
3 SOLUTION RESPIRATORY (INHALATION) EVERY 6 HOURS PRN
Qty: 270 ML | Refills: 6 | Status: SHIPPED | OUTPATIENT
Start: 2022-12-28 | End: 2023-02-10

## 2022-12-28 NOTE — TELEPHONE ENCOUNTER
Yes, patient called [facility] and they have 1 box left so please send refill request to [facility]

## 2023-01-03 NOTE — TELEPHONE ENCOUNTER
----- Message from Ana Bruno MD sent at 3/17/2022  3:25 PM EDT -----  Please inform dexa scan shows osteopenia (precursor to osteoporosis)  Recommend cont weight bearing exercise as tolerated (eg walking) and continued daily ca/vit d/mag supplements  Addended by: FABIAN HURD on: 1/3/2023 10:01 AM     Modules accepted: Orders

## 2023-02-10 DIAGNOSIS — J45.909 UNCOMPLICATED ASTHMA, UNSPECIFIED ASTHMA SEVERITY, UNSPECIFIED WHETHER PERSISTENT: ICD-10-CM

## 2023-02-10 DIAGNOSIS — R06.2 WHEEZE: ICD-10-CM

## 2023-02-10 RX ORDER — IPRATROPIUM BROMIDE AND ALBUTEROL SULFATE 2.5; .5 MG/3ML; MG/3ML
SOLUTION RESPIRATORY (INHALATION)
Qty: 270 ML | Refills: 0 | Status: SHIPPED | OUTPATIENT
Start: 2023-02-10

## 2023-03-13 DIAGNOSIS — R06.2 WHEEZE: ICD-10-CM

## 2023-03-13 DIAGNOSIS — J45.909 UNCOMPLICATED ASTHMA, UNSPECIFIED ASTHMA SEVERITY, UNSPECIFIED WHETHER PERSISTENT: ICD-10-CM

## 2023-03-13 RX ORDER — IPRATROPIUM BROMIDE AND ALBUTEROL SULFATE 2.5; .5 MG/3ML; MG/3ML
3 SOLUTION RESPIRATORY (INHALATION) EVERY 6 HOURS PRN
Qty: 270 ML | Refills: 0 | Status: SHIPPED | OUTPATIENT
Start: 2023-03-13

## 2023-03-30 NOTE — PATIENT INSTRUCTIONS
Asthma   AMBULATORY CARE:   Asthma  is a lung disease that makes breathing difficult  Chronic inflammation and reactions to triggers narrow the airways in your lungs  Asthma can become life-threatening if it is not managed  Cough-variant asthma  is a type of asthma that causes a dry cough that keeps coming back  A dry cough may be your only symptom, or you may also have chest tightness  These symptoms may be caused by exercise or exposure to odors, allergens, or respiratory tract infections  Cough-variant asthma is treated the same way as typical asthma  Common symptoms include the following:   · Coughing     · Wheezing     · Shortness of breath     · Chest tightness  Seek care immediately if:   · You have severe shortness of breath  · Your lips or nails turn blue or gray  · The skin around your neck and ribs pulls in with each breath  · You have shortness of breath, even after you take your short-term medicine as directed  · Your peak flow numbers are in the red zone of your AAP  Contact your healthcare provider if:   · You run out of medicine before your next refill is due  · Your symptoms get worse  · You need to take more medicine than usual to control your symptoms  · You have questions or concerns about your condition or care  Treatment for asthma  will depend on how severe your asthma is  Medicine may decrease inflammation, open airways, and make it easier to breathe  Medicines may be inhaled, taken as a pill, or injected  Short-term medicines relieve your symptoms quickly  Long-term medicines are used to prevent future attacks  You may also need medicine to help control your allergies  Manage and prevent future asthma attacks:   · Follow your asthma action plan  This is a written plan that you and your healthcare provider create  It explains which medicine you need and when to change doses if necessary   It also explains how you can monitor symptoms and use a peak flow meter  The meter measures how well your lungs are working  · Manage other health conditions , such as allergies, acid reflux, and sleep apnea  · Identify and avoid triggers  These may include pets, dust mites, mold, and cockroaches  · Do not smoke or be around others who smoke  Nicotine and other chemicals in cigarettes and cigars can cause lung damage  Ask your healthcare provider for information if you currently smoke and need help to quit  E-cigarettes or smokeless tobacco still contain nicotine  Talk to your healthcare provider before you use these products  · Ask about the flu vaccine  The flu can make your asthma worse  You may need a yearly flu shot  Follow up with your healthcare provider as directed: You will need to return to make sure your medicine is working and your symptoms are controlled  You may be referred to an asthma or allergy specialist  Kasi Castro may be asked to keep a record of your peak flow values and bring it with you to your appointments  Write down your questions so you remember to ask them during your visits  © 2017 [address] Information is for End User's use only and may not be sold, redistributed or otherwise used for commercial purposes  All illustrations and images included in CareNotes® are the copyrighted property of [employer]  or [employer]  The above information is an  only  It is not intended as medical advice for individual conditions or treatments  Talk to your doctor, nurse or pharmacist before following any medical regimen to see if it is safe and effective for you  Acute Bronchitis   AMBULATORY CARE:   Acute bronchitis  is swelling and irritation in the air passages of your lungs  This irritation may cause you to cough or have other breathing problems  Acute bronchitis often starts because of another illness, such as a cold or the flu   The illness spreads from your nose and throat to your windpipe and airways  Bronchitis is often called a chest cold  Acute bronchitis lasts about 3 to 6 weeks and is usually not a serious illness  Your cough can last for several weeks  You may have any of the following symptoms:   · A cough with sputum that may be clear, yellow, or green    · Feeling more tired than usual, and body aches    · A fever and chills    · Wheezing when you breathe    · A tight chest or pain when you breathe or cough  Seek care immediately if:   · You cough up blood  · Your lips or fingernails turn blue  · You feel like you are not getting enough air when you breathe  Contact your healthcare provider if:   · You have a fever  · Your breathing problems do not go away or get worse  · Your cough does not get better within 4 weeks  · You have questions or concerns about your condition or care  Self-care:   · Get more rest   Rest helps your body to heal  Slowly start to do more each day  Rest when you feel it is needed  · Avoid irritants in the air  Avoid chemicals, fumes, and dust  Wear a face mask if you must work around dust or fumes  Stay inside on days when air pollution levels are high  If you have allergies, stay inside when pollen counts are high  Do not use aerosol products, such as spray-on deodorant, bug spray, and hair spray  · Do not smoke or be around others who smoke  Nicotine and other chemicals in cigarettes and cigars damages the cilia that move mucus out of your lungs  Ask your healthcare provider for information if you currently smoke and need help to quit  E-cigarettes or smokeless tobacco still contain nicotine  Talk to your healthcare provider before you use these products  · Drink liquids as directed  Liquids help keep your air passages moist and help you cough up mucus  You may need to drink more liquids when you have acute bronchitis  Ask how much liquid to drink each day and which liquids are best for you  · Use a humidifier or vaporizer    Use a cool mist humidifier or a vaporizer to increase air moisture in your home  This may make it easier for you to breathe and help decrease your cough  Prevent acute bronchitis by doing the following:   · Get the vaccinations you need  Ask your healthcare provider if you should get vaccinated against the flu or pneumonia  · Prevent the spread of germs  You can decrease your risk of acute bronchitis and other illnesses by doing the following:     [facility] your hands often with soap and water  Carry germ-killing hand lotion or gel with you  You can use the lotion or gel to clean your hands when soap and water are not available  ¨ Do not touch your eyes, nose, or mouth unless you have washed your hands first     ¨ Always cover your mouth when you cough to prevent the spread of germs  It is best to cough into a tissue or your shirt sleeve instead of into your hand  Ask those around you cover their mouths when they cough  ¨ Try to avoid people who have a cold or the flu  If you are sick, stay away from others as much as possible  Medicines: Your healthcare provider may  give you any of the following:  · Ibuprofen or acetaminophen  are medicines that help lower your fever  They are available without a doctor's order  Ask your healthcare provider which medicine is right for you  Ask how much to take and how often to take it  Follow directions  These medicines can cause stomach bleeding if not taken correctly  Ibuprofen can cause kidney damage  Do not take ibuprofen if you have kidney disease, an ulcer, or allergies to aspirin  Acetaminophen can cause liver damage  Do not take more than 4,000 milligrams in 24 hours  · Decongestants  help loosen mucus in your lungs and make it easier to cough up  This can help you breathe easier  · Cough suppressants  decrease your urge to cough  If your cough produces mucus, do not take a cough suppressant unless your healthcare provider tells you to   Your healthcare provider may suggest that you take a cough suppressant at night so you can rest     · Inhalers  may be given  Your healthcare provider may give you one or more inhalers to help you breathe easier and cough less  An inhaler gives your medicine to open your airways  Ask your healthcare provider to show you how to use your inhaler correctly  Follow up with your healthcare provider as directed:  Write down questions you have so you will remember to ask them during your follow-up visits  © 2017 [address] Information is for End User's use only and may not be sold, redistributed or otherwise used for commercial purposes  All illustrations and images included in CareNotes® are the copyrighted property of [employer] M [employer]  or Breezy Edwards  The above information is an  only  It is not intended as medical advice for individual conditions or treatments  Talk to your doctor, nurse or pharmacist before following any medical regimen to see if it is safe and effective for you  Normal for race

## 2023-03-31 ENCOUNTER — TELEPHONE (OUTPATIENT)
Dept: FAMILY MEDICINE CLINIC | Facility: CLINIC | Age: 78
End: 2023-03-31

## 2023-05-08 ENCOUNTER — OFFICE VISIT (OUTPATIENT)
Dept: FAMILY MEDICINE CLINIC | Facility: CLINIC | Age: 78
End: 2023-05-08

## 2023-05-08 VITALS
SYSTOLIC BLOOD PRESSURE: 130 MMHG | HEART RATE: 76 BPM | HEIGHT: 64 IN | TEMPERATURE: 98 F | RESPIRATION RATE: 16 BRPM | BODY MASS INDEX: 33.46 KG/M2 | DIASTOLIC BLOOD PRESSURE: 70 MMHG | WEIGHT: 196 LBS

## 2023-05-08 DIAGNOSIS — Z12.31 ENCOUNTER FOR SCREENING MAMMOGRAM FOR BREAST CANCER: ICD-10-CM

## 2023-05-08 DIAGNOSIS — R73.09 ABNORMAL GLUCOSE: ICD-10-CM

## 2023-05-08 DIAGNOSIS — J44.9 ASTHMA-COPD OVERLAP SYNDROME (HCC): Primary | ICD-10-CM

## 2023-05-08 DIAGNOSIS — M25.562 CHRONIC PAIN OF LEFT KNEE: ICD-10-CM

## 2023-05-08 DIAGNOSIS — E78.2 MIXED HYPERLIPIDEMIA: ICD-10-CM

## 2023-05-08 DIAGNOSIS — H25.89 OTHER AGE-RELATED CATARACT OF RIGHT EYE: ICD-10-CM

## 2023-05-08 DIAGNOSIS — G89.29 CHRONIC PAIN OF LEFT KNEE: ICD-10-CM

## 2023-05-08 DIAGNOSIS — Z13.29 SCREENING FOR THYROID DISORDER: ICD-10-CM

## 2023-05-08 DIAGNOSIS — Z87.898 HISTORY OF PREDIABETES: ICD-10-CM

## 2023-05-08 DIAGNOSIS — H53.9 VISUAL CHANGES: ICD-10-CM

## 2023-05-08 DIAGNOSIS — R60.0 LOWER LEG EDEMA: ICD-10-CM

## 2023-05-08 DIAGNOSIS — Z13.0 SCREENING FOR DEFICIENCY ANEMIA: ICD-10-CM

## 2023-05-08 PROBLEM — J45.909 MODERATE ASTHMA: Status: ACTIVE | Noted: 2023-05-08

## 2023-05-08 PROBLEM — R06.02 SHORTNESS OF BREATH: Status: ACTIVE | Noted: 2022-12-23

## 2023-05-08 PROBLEM — R61 CHRONIC NIGHT SWEATS: Status: RESOLVED | Noted: 2022-04-03 | Resolved: 2023-05-08

## 2023-05-08 RX ORDER — ROSUVASTATIN CALCIUM 10 MG/1
TABLET, COATED ORAL
COMMUNITY
Start: 2023-02-10

## 2023-05-08 RX ORDER — CILOSTAZOL 100 MG/1
TABLET ORAL
COMMUNITY
Start: 2023-04-18

## 2023-05-08 RX ORDER — CILOSTAZOL 100 MG/1
100 TABLET ORAL 2 TIMES DAILY
COMMUNITY

## 2023-05-08 RX ORDER — ERYTHROMYCIN 5 MG/G
OINTMENT OPHTHALMIC
COMMUNITY
Start: 2023-04-29 | End: 2023-05-08

## 2023-05-08 RX ORDER — CETIRIZINE HYDROCHLORIDE 10 MG/1
CAPSULE, LIQUID FILLED ORAL EVERY 24 HOURS
COMMUNITY
End: 2023-05-08

## 2023-05-08 RX ORDER — ALBUTEROL SULFATE 90 UG/1
2 AEROSOL, METERED RESPIRATORY (INHALATION) EVERY 4 HOURS PRN
Qty: 18 G | Refills: 3 | Status: SHIPPED | OUTPATIENT
Start: 2023-05-08

## 2023-05-08 RX ORDER — FUROSEMIDE 20 MG/1
TABLET ORAL
Qty: 10 TABLET | Refills: 0 | Status: SHIPPED | OUTPATIENT
Start: 2023-05-08

## 2023-05-08 RX ORDER — PREDNISONE 20 MG/1
TABLET ORAL
COMMUNITY
Start: 2023-03-24 | End: 2023-05-08

## 2023-05-08 NOTE — PROGRESS NOTES
Assessment/Plan:    1  Asthma-COPD overlap syndrome (HCC)  -     albuterol (ProAir HFA) 90 mcg/act inhaler; Inhale 2 puffs every 4 (four) hours as needed for wheezing  -     furosemide (LASIX) 20 mg tablet; One tablet po Monday-Wed--Fri    2  Encounter for screening mammogram for breast cancer  -     Mammo screening bilateral w 3d & cad; Future; Expected date: 05/08/2023    3  Chronic pain of left knee  -     XR knee 3 vw left non injury; Future; Expected date: 05/08/2023    4  Lower leg edema  -     furosemide (LASIX) 20 mg tablet; One tablet po Monday-Wed--Fri    5  Visual changes  -     Ambulatory Referral to Ophthalmology; Future  -     Ambulatory Referral to Ophthalmology; Future    6  Other age-related cataract of right eye  -     Ambulatory Referral to Ophthalmology; Future  -     Ambulatory Referral to Ophthalmology; Future    7  History of prediabetes    8  Abnormal glucose  -     Comprehensive metabolic panel; Future  -     Magnesium; Future  -     Hemoglobin A1C; Future    9  Mixed hyperlipidemia  -     Lipase; Future  -     Comprehensive metabolic panel; Future  -     Lipid Panel with Direct LDL reflex; Future  -     Magnesium; Future  -     TSH, 3rd generation; Future    10  Screening for thyroid disorder  -     TSH, 3rd generation; Future    11  Screening for deficiency anemia  -     CBC; Future    12  BMI 33 0-33 9,adult    BMI Counseling: Body mass index is 33 64 kg/m²  The BMI is above normal  Nutrition recommendations include encouraging healthy choices of fruits and vegetables, consuming healthier snacks, moderation in carbohydrate intake, increasing intake of lean protein, reducing intake of saturated and trans fat and reducing intake of cholesterol  Exercise recommendations include exercising 3-5 times per week and strength training exercises  No pharmacotherapy was ordered  Rationale for BMI follow-up plan is due to patient being overweight or obese       Depression Screening and Follow-up Plan: Patient was screened for depression during today's encounter  They screened negative with a PHQ-2 score of 0  Subjective:      Patient ID: Vickei Hensley is a 70 y o  female  Chief Complaint   Patient presents with   • Joint Swelling     Left ankle swelling and burning sensation when walking    • Asthma   • Joint Pain       HPI  Follow-up  Interval hx reviewed  BP in range  Labs due  C/o jt pain and swelling   No new injury, fever or rash  Ongoing fatigue, diff w asthma w seasonal allergies  Cont t struggle weight    The following portions of the patient's history were reviewed and updated as appropriate: allergies, current medications, past family history, past medical history, past social history, past surgical history and problem list     Review of Systems   Constitutional: Positive for fatigue  Negative for fever  Respiratory: Positive for wheezing (intermittent)  TIERNEY   Cardiovascular: Negative for chest pain  Musculoskeletal: Positive for arthralgias  Allergic/Immunologic: Positive for environmental allergies  Neurological: Negative  Psychiatric/Behavioral: Positive for sleep disturbance           Current Outpatient Medications   Medication Sig Dispense Refill   • albuterol (ProAir HFA) 90 mcg/act inhaler Inhale 2 puffs every 4 (four) hours as needed for wheezing 18 g 3   • amLODIPine (NORVASC) 5 mg tablet TAKE 1 TABLET BY MOUTH  TWICE DAILY 180 tablet 3   • b complex vitamins capsule Take 1 capsule by mouth       • betamethasone dipropionate (DIPROSONE) 0 05 % cream Apply topically 2 (two) times a day 30 g 5   • Biotin 5000 MCG CAPS Take 2 capsules by mouth daily       • CALCIUM PO Take 600 mg by mouth       • cetirizine (ZyrTEC) 10 mg tablet      • Cholecalciferol (VITAMIN D3 PO) 1 capsule every 24 hours     • cilostazol (PLETAL) 100 mg tablet TAKE 1 TABLET BY MOUTH 30 MINUTES BEFORE OR 2 HOURS AFTER BREAKFAST AND DINNER TWICE DAILY FOR 30 DAYS     • cilostazol (PLETAL) 100 mg "tablet Take 100 mg by mouth 2 (two) times a day     • clotrimazole-betamethasone (LOTRISONE) 1-0 05 % cream Apply topically 2 (two) times a day 45 g 1   • furosemide (LASIX) 20 mg tablet One tablet po Monday-Wed--Fri 10 tablet 0   • ipratropium-albuterol (DUO-NEB) 0 5-2 5 mg/3 mL nebulizer solution Take 3 mL by nebulization every 6 (six) hours as needed for wheezing or shortness of breath 270 mL 0   • losartan (COZAAR) 100 MG tablet Take 100 mg by mouth daily      • Magnesium 400 MG TABS Take by mouth     • montelukast (SINGULAIR) 10 mg tablet TAKE 1 TABLET BY MOUTH  DAILY AT BEDTIME 90 tablet 3   • omeprazole (PriLOSEC) 40 MG capsule TAKE 1 CAPSULE BY MOUTH  DAILY 90 capsule 3   • rosuvastatin (CRESTOR) 10 MG tablet      • Trelegy Ellipta 200-62 5-25 MCG/INH AEPB inhaler      • VITAMIN B1-B12 PO Take by mouth       No current facility-administered medications for this visit  Objective:    /70 (BP Location: Left arm, Patient Position: Sitting, Cuff Size: Large)   Pulse 76   Temp 98 °F (36 7 °C)   Resp 16   Ht 5' 4\" (1 626 m)   Wt 88 9 kg (196 lb)   BMI 33 64 kg/m²        Physical Exam  Vitals and nursing note reviewed  Constitutional:       Comments: OW   Cardiovascular:      Rate and Rhythm: Normal rate and regular rhythm  Pulmonary:      Effort: Pulmonary effort is normal  No respiratory distress  Abdominal:      General: Bowel sounds are normal       Palpations: Abdomen is soft  Tenderness: There is no abdominal tenderness  Musculoskeletal:         General: Tenderness (left knee w restricted ROM) present  Right lower leg: Edema present  Left lower leg: Edema present  Skin:     General: Skin is warm and dry  Coloration: Skin is not jaundiced  Findings: No erythema or rash  Neurological:      Mental Status: She is alert and oriented to person, place, and time     Psychiatric:         Mood and Affect: Mood normal                 Silvia Al MD  "

## 2023-05-23 PROBLEM — Z13.29 SCREENING FOR THYROID DISORDER: Status: ACTIVE | Noted: 2020-06-30

## 2023-05-23 PROBLEM — R73.09 ABNORMAL GLUCOSE: Status: ACTIVE | Noted: 2023-05-23

## 2023-05-23 PROBLEM — Z13.0 SCREENING FOR DEFICIENCY ANEMIA: Status: ACTIVE | Noted: 2020-06-30

## 2023-05-23 PROBLEM — Z87.898 HISTORY OF PREDIABETES: Status: ACTIVE | Noted: 2023-05-23

## 2023-06-06 DIAGNOSIS — R06.2 WHEEZE: ICD-10-CM

## 2023-06-06 DIAGNOSIS — J45.909 UNCOMPLICATED ASTHMA, UNSPECIFIED ASTHMA SEVERITY, UNSPECIFIED WHETHER PERSISTENT: ICD-10-CM

## 2023-06-06 RX ORDER — IPRATROPIUM BROMIDE AND ALBUTEROL SULFATE 2.5; .5 MG/3ML; MG/3ML
3 SOLUTION RESPIRATORY (INHALATION) EVERY 6 HOURS PRN
Qty: 270 ML | Refills: 0 | Status: SHIPPED | OUTPATIENT
Start: 2023-06-06

## 2023-07-21 LAB
LEFT EYE DIABETIC RETINOPATHY: NORMAL
RIGHT EYE DIABETIC RETINOPATHY: NORMAL

## 2023-07-22 PROBLEM — Z13.0 SCREENING FOR DEFICIENCY ANEMIA: Status: RESOLVED | Noted: 2020-06-30 | Resolved: 2023-07-22

## 2023-08-04 ENCOUNTER — RA CDI HCC (OUTPATIENT)
Dept: OTHER | Facility: HOSPITAL | Age: 78
End: 2023-08-04

## 2023-08-04 NOTE — PROGRESS NOTES
e11.40  [facility] coding opportunities          Chart Reviewed number of suggestions sent to Provider: 1     Patients Insurance     Medicare Insurance: [facility]

## 2023-08-09 DIAGNOSIS — J45.909 UNCOMPLICATED ASTHMA, UNSPECIFIED ASTHMA SEVERITY, UNSPECIFIED WHETHER PERSISTENT: ICD-10-CM

## 2023-08-09 DIAGNOSIS — R06.2 WHEEZE: ICD-10-CM

## 2023-08-09 RX ORDER — IPRATROPIUM BROMIDE AND ALBUTEROL SULFATE 2.5; .5 MG/3ML; MG/3ML
SOLUTION RESPIRATORY (INHALATION)
Qty: 270 ML | Refills: 0 | Status: SHIPPED | OUTPATIENT
Start: 2023-08-09

## 2023-08-10 LAB
ALBUMIN SERPL-MCNC: 4.4 G/DL (ref 3.8–4.8)
ALBUMIN/GLOB SERPL: 1.7 {RATIO} (ref 1.2–2.2)
ALP SERPL-CCNC: 81 IU/L (ref 44–121)
ALT SERPL-CCNC: 17 IU/L (ref 0–32)
AST SERPL-CCNC: 16 IU/L (ref 0–40)
BASOPHILS # BLD AUTO: 0.1 X10E3/UL (ref 0–0.2)
BASOPHILS NFR BLD AUTO: 1 %
BILIRUB SERPL-MCNC: 0.4 MG/DL (ref 0–1.2)
BUN SERPL-MCNC: 13 MG/DL (ref 8–27)
BUN/CREAT SERPL: 18 (ref 12–28)
CALCIUM SERPL-MCNC: 9.4 MG/DL (ref 8.7–10.3)
CHLORIDE SERPL-SCNC: 105 MMOL/L (ref 96–106)
CHOLEST SERPL-MCNC: 149 MG/DL (ref 100–199)
CO2 SERPL-SCNC: 22 MMOL/L (ref 20–29)
CREAT SERPL-MCNC: 0.71 MG/DL (ref 0.57–1)
EGFR: 88 ML/MIN/1.73
EOSINOPHIL # BLD AUTO: 0.3 X10E3/UL (ref 0–0.4)
EOSINOPHIL NFR BLD AUTO: 4 %
ERYTHROCYTE [DISTWIDTH] IN BLOOD BY AUTOMATED COUNT: 11.8 % (ref 11.7–15.4)
GLOBULIN SER-MCNC: 2.6 G/DL (ref 1.5–4.5)
GLUCOSE SERPL-MCNC: 98 MG/DL (ref 70–99)
HBA1C MFR BLD: 5.8 % (ref 4.8–5.6)
HCT VFR BLD AUTO: 43.6 % (ref 34–46.6)
HDLC SERPL-MCNC: 58 MG/DL
HGB BLD-MCNC: 14.8 G/DL (ref 11.1–15.9)
IMM GRANULOCYTES # BLD: 0 X10E3/UL (ref 0–0.1)
IMM GRANULOCYTES NFR BLD: 0 %
LDLC SERPL CALC-MCNC: 68 MG/DL (ref 0–99)
LIPASE SERPL-CCNC: 28 U/L (ref 14–85)
LYMPHOCYTES # BLD AUTO: 2.3 X10E3/UL (ref 0.7–3.1)
LYMPHOCYTES NFR BLD AUTO: 33 %
MAGNESIUM SERPL-MCNC: 2.1 MG/DL (ref 1.6–2.3)
MCH RBC QN AUTO: 30.8 PG (ref 26.6–33)
MCHC RBC AUTO-ENTMCNC: 33.9 G/DL (ref 31.5–35.7)
MCV RBC AUTO: 91 FL (ref 79–97)
MICRODELETION SYND BLD/T FISH: NORMAL
MONOCYTES # BLD AUTO: 0.5 X10E3/UL (ref 0.1–0.9)
MONOCYTES NFR BLD AUTO: 7 %
NEUTROPHILS # BLD AUTO: 3.8 X10E3/UL (ref 1.4–7)
NEUTROPHILS NFR BLD AUTO: 55 %
PLATELET # BLD AUTO: 276 X10E3/UL (ref 150–450)
POTASSIUM SERPL-SCNC: 4.5 MMOL/L (ref 3.5–5.2)
PROT SERPL-MCNC: 7 G/DL (ref 6–8.5)
RBC # BLD AUTO: 4.81 X10E6/UL (ref 3.77–5.28)
SODIUM SERPL-SCNC: 142 MMOL/L (ref 134–144)
TRIGL SERPL-MCNC: 133 MG/DL (ref 0–149)
TSH SERPL DL<=0.005 MIU/L-ACNC: 1.68 UIU/ML (ref 0.45–4.5)
WBC # BLD AUTO: 7 X10E3/UL (ref 3.4–10.8)

## 2023-08-15 ENCOUNTER — TELEPHONE (OUTPATIENT)
Dept: ADMINISTRATIVE | Facility: OTHER | Age: 78
End: 2023-08-15

## 2023-08-15 ENCOUNTER — OFFICE VISIT (OUTPATIENT)
Dept: FAMILY MEDICINE CLINIC | Facility: CLINIC | Age: 78
End: 2023-08-15
Payer: MEDICARE

## 2023-08-15 VITALS
RESPIRATION RATE: 18 BRPM | BODY MASS INDEX: 39.09 KG/M2 | HEART RATE: 70 BPM | WEIGHT: 229 LBS | SYSTOLIC BLOOD PRESSURE: 138 MMHG | HEIGHT: 64 IN | DIASTOLIC BLOOD PRESSURE: 60 MMHG | TEMPERATURE: 98.2 F

## 2023-08-15 DIAGNOSIS — S89.92XS INJURY OF LEFT LOWER EXTREMITY, SEQUELA: ICD-10-CM

## 2023-08-15 DIAGNOSIS — Z00.00 MEDICARE ANNUAL WELLNESS VISIT, SUBSEQUENT: Primary | ICD-10-CM

## 2023-08-15 DIAGNOSIS — L30.9 ECZEMA, UNSPECIFIED TYPE: ICD-10-CM

## 2023-08-15 DIAGNOSIS — J44.9 ASTHMA-COPD OVERLAP SYNDROME (HCC): ICD-10-CM

## 2023-08-15 DIAGNOSIS — R73.03 PREDIABETES: ICD-10-CM

## 2023-08-15 DIAGNOSIS — I10 BENIGN ESSENTIAL HYPERTENSION: ICD-10-CM

## 2023-08-15 PROCEDURE — 99214 OFFICE O/P EST MOD 30 MIN: CPT | Performed by: FAMILY MEDICINE

## 2023-08-15 PROCEDURE — G0439 PPPS, SUBSEQ VISIT: HCPCS | Performed by: FAMILY MEDICINE

## 2023-08-15 PROCEDURE — 81003 URINALYSIS AUTO W/O SCOPE: CPT | Performed by: FAMILY MEDICINE

## 2023-08-15 RX ORDER — BETAMETHASONE DIPROPIONATE 0.5 MG/G
CREAM TOPICAL 2 TIMES DAILY
Qty: 30 G | Refills: 5 | Status: SHIPPED | OUTPATIENT
Start: 2023-08-15

## 2023-08-15 NOTE — PROGRESS NOTES
Assessment and Plan:     Problem List Items Addressed This Visit     Asthma-COPD overlap syndrome ([address])     Follows w pulm-Dr. Green--[facility]         Benign essential hypertension    BMI 39.0-39.9,adult    Eczema    Relevant Medications    betamethasone dipropionate (DIPROSONE) 0.05 % cream    Injury of left leg    Medicare annual wellness visit, subsequent - Primary    Prediabetes    Relevant Orders    Microalbumin,Urine    POCT urine dip auto non-scope (Completed)        Preventive health issues were discussed with patient, and age appropriate screening tests were ordered as noted in patient's After Visit Summary. Personalized health advice and appropriate referrals for health education or preventive services given if needed, as noted in patient's After Visit Summary. History of Present Illness:     Patient presents for a Medicare Wellness Visit    HPI     Follow-up   Interval hx reviewed including leg injury from metal bar falling into shower  Requires 14 stitches and wound care  Also reports will be needing vascular surgery w stenting in near future--surg-Dr. Annetta Lindsay  Will need cardio clearance-Dr. Satnam Vidal Codispoti and pulm clearance from Dr. Osiel Lorenz  BP in range  Recent labs--OqT9u=8.8%, remainder labs wnl         Review of Systems:     Review of Systems   Constitutional: Positive for fatigue. Negative for fever. Respiratory: Positive for wheezing (intermittent). TIERNEY   Cardiovascular: Negative for chest pain. Musculoskeletal: Positive for arthralgias. Allergic/Immunologic: Positive for environmental allergies. Neurological: Negative. Psychiatric/Behavioral: Positive for sleep disturbance.         Problem List:     Patient Active Problem List   Diagnosis   • DM type 2 with diabetic mixed hyperlipidemia (HCC)   • Borderline hyperlipidemia   • Benign essential hypertension   • Asthma-COPD overlap syndrome (HCC)   • Gastroesophageal reflux disease with esophagitis   • Hyperlipidemia   • Osteopenia of multiple sites   • Nonalcoholic fatty liver disease   • Pulmonary nodule seen on imaging study   • Suspected sleep apnea   • Infected abrasion of right hand   • Anterior scleritis   • Senile cataract   • Simple chronic bronchitis (HCC)   • Dizziness   • Hypertension   • Class 2 severe obesity due to excess calories with serious comorbidity in adult [facility])   • Sedentary lifestyle   • Mitral valve disease   • Osteoporosis   • Vitamin D deficiency   • Sprain of right wrist   • Cough   • Need for hepatitis C screening test   • Medicare annual wellness visit, subsequent   • BMI 39.0-39.9,adult   • Neuropathy   • Dysuria   • Prediabetes   • Chronic fatigue   • Thyroid fullness   • Arterial calcification   • Left knee pain   • Shortness of breath   • Mixed hyperlipidemia   • Moderate asthma   • Lower leg edema   • Visual changes   • History of prediabetes   • Abnormal glucose   • Eczema   • Injury of left leg      Past Medical and Surgical History:     Past Medical History:   Diagnosis Date   • Allergic rhinitis 08/25/2008   • Arthritis     LAST ASSESSED: [id number]   • Asthma    • Asthma with acute exacerbation 06/24/2009   • Asthma-COPD overlap syndrome (HCC)    • Bilateral renal cysts     RESOLVED: [id number]   • COPD (chronic obstructive pulmonary disease) ([address])    • Eczema     LAST ASSESSED: [id number]   • Hiatal hernia     LAST ASSESSED: [id number]   • Hirsutism 09/26/2011   • Osteopenia of multiple sites 9/5/2012    Procedure/Onset: 05/22/2009   • Pleurisy     LAST ASSESSED: [id number]   • Pneumonia     LAST ASSESSED: [id number]   • Umbilical hernia 72/91/4337     Past Surgical History:   Procedure Laterality Date   • APPENDECTOMY      LAST ASSESSED: [id number]   • CYSTECTOMY      OVARIAN   • INCISIONAL BREAST BIOPSY     • TONSILLECTOMY        Family History:     Family History   Problem Relation Age of Onset   • Cancer Mother    • Hypertension Mother         BENIGN ESSENTIAL HYPERTENSION WITH TARGET BLOOD PRESSURE BELOW 140/90   • Ovarian cancer Mother    • Hypertension Father         BENIGN ESSENTIAL HYPERTENSION WITH TARGET BLOOD PRESSURE BELOW 140/90      Social History:     Social History     Socioeconomic History   • Marital status: /Civil Union     Spouse name: None   • Number of children: None   • Years of education: None   • Highest education level: None   Occupational History   • Occupation: RETIRED WORKED AS    Tobacco Use   • Smoking status: Former     Packs/day: 1.00     Years: 20.00     Total pack years: 20.00     Types: Cigarettes     Start date: 1962     Quit date: 1980     Years since quittin.1     Passive exposure: Never   • Smokeless tobacco: Never   • Tobacco comments:     QUIT AT THE AGE OF 50, DENIED: HISTORY OF SECOND HAND SMOKE EXPOSURE   Vaping Use   • Vaping Use: Never used   Substance and Sexual Activity   • Alcohol use: Yes     Comment: SOCIAL, DAILY   • Drug use: No   • Sexual activity: None   Other Topics Concern   • None   Social History Narrative    DENIED: HISTORY OF PETS/ANIMALS    DENIED: HISTORY OF TRAVEL HISTORY     Social Determinants of Health     Financial Resource Strain: Low Risk  (8/10/2023)    Overall Financial Resource Strain (CARDIA)    • Difficulty of Paying Living Expenses: Not very hard   Food Insecurity: Not on file   Transportation Needs: No Transportation Needs (8/10/2023)    PRAPARE - Transportation    • Lack of Transportation (Medical): No    • Lack of Transportation (Non-Medical):  No   Physical Activity: Not on file   Stress: Not on file   Social Connections: Not on file   Intimate Partner Violence: Not on file   Housing Stability: Not on file      Medications and Allergies:     Current Outpatient Medications   Medication Sig Dispense Refill   • albuterol (ProAir HFA) 90 mcg/act inhaler Inhale 2 puffs every 4 (four) hours as needed for wheezing 18 g 3   • amLODIPine (NORVASC) 5 mg tablet TAKE 1 TABLET BY MOUTH  TWICE DAILY 180 tablet 3   • b complex vitamins capsule Take 1 capsule by mouth       • betamethasone dipropionate (DIPROSONE) 0.05 % cream Apply topically 2 (two) times a day 30 g 5   • Biotin 5000 MCG CAPS Take 2 capsules by mouth daily       • CALCIUM PO Take 600 mg by mouth       • cetirizine (ZyrTEC) 10 mg tablet      • Cholecalciferol (VITAMIN D3 PO) 1 capsule every 24 hours     • clotrimazole-betamethasone (LOTRISONE) 1-0.05 % cream Apply topically 2 (two) times a day 45 g 1   • ipratropium-albuterol (DUO-NEB) 0.5-2.5 mg/3 mL nebulizer solution USE 1 AMPULE IN NEBULIZER EVERY 6 HOURS AS NEEDED FOR WHEEZING FOR SHORTNESS OF BREATH 270 mL 0   • losartan (COZAAR) 100 MG tablet Take 100 mg by mouth daily      • Magnesium 400 MG TABS Take by mouth     • montelukast (SINGULAIR) 10 mg tablet TAKE 1 TABLET BY MOUTH  DAILY AT BEDTIME 90 tablet 3   • omeprazole (PriLOSEC) 40 MG capsule TAKE 1 CAPSULE BY MOUTH  DAILY 90 capsule 3   • rosuvastatin (CRESTOR) 10 MG tablet      • Trelegy Ellipta 200-62.5-25 MCG/INH AEPB inhaler      • VITAMIN B1-B12 PO Take by mouth       No current facility-administered medications for this visit. Allergies   Allergen Reactions   • Nuts - Food Allergy Other (See Comments), Hives and Anaphylaxis     [place] nuts, hazelnut  [place] nuts, hazelnut  Brazil nuts, hazelnut   • Aspirin GI Bleeding     Stomach bleeding  Stomach bleeding  Stomach bleeding   • Bee Pollen Other (See Comments)   • Cimetidine      Reaction Date: 25Aug2008;    • Ciprofloxacin Other (See Comments)     Acid reflux  Acid reflux  Reaction Date: 22Dec2011; Annotation - [id number]: pt had cramping. tc/cma   • Egg Solids, Whole Itching     Can eat egg but cannot touch. • Eggs Or Egg-Derived Products - Food Allergy Other (See Comments)   • Other Hives   • Pollen Extract    • Sulfamethoxazole-Trimethoprim Other (See Comments)     Other reaction(s):  Other (See Comments), Other (See Comments)      Immunizations:     Immunization History Administered Date(s) Administered   • COVID-19 PFIZER VACCINE 0.3 ML IM 02/21/2021, 03/14/2021, 10/26/2021, 11/13/2022   • Influenza Split High Dose Preservative Free IM 10/06/2021   • Influenza, recombinant, quadrivalent,injectable, preservative free 10/08/2020   • Pneumococcal Conjugate 13-Valent 01/06/2016   • Pneumococcal Polysaccharide PPV23 05/22/2013   • Tdap 12/14/2012, 05/27/2023      Health Maintenance:         Topic Date Due   • Breast Cancer Screening: Mammogram  05/10/2023   • DXA SCAN  03/17/2024   • Hepatitis C Screening  Completed   • Colorectal Cancer Screening  Discontinued         Topic Date Due   • COVID-19 Vaccine (5 - Pfizer series) 01/08/2023   • Influenza Vaccine (1) 09/01/2023      Medicare Screening Tests and Risk Assessments:     Augustus Enciso is here for her Subsequent Wellness visit. Last Medicare Wellness visit information reviewed, patient interviewed and updates made to the record today. Health Risk Assessment:   Patient rates overall health as fair. Patient feels that their physical health rating is same. Patient is satisfied with their life. Eyesight was rated as same. Hearing was rated as same. Patient feels that their emotional and mental health rating is same. Patients states they are never, rarely angry. Patient states they are sometimes unusually tired/fatigued. Pain experienced in the last 7 days has been some. Patient's pain rating has been 5/10. Patient states that she has experienced no weight loss or gain in last 6 months. Fall Risk Screening: In the past year, patient has experienced: history of falling in past year    Number of falls: 2 or more  Injured during fall?: No    Feels unsteady when standing or walking?: Yes    Worried about falling?: Yes      Urinary Incontinence Screening:   Patient has leaked urine accidently in the last six months. I use Always pads because sometimes when I cough I leak urine.     Home Safety:  Patient does not have trouble with stairs inside or outside of their home. Patient has working smoke alarms and has working carbon monoxide detector. Home safety hazards include: not having non-slip bath and/or shower mats. Need better safety bars in bathtub and shower. Nutrition:   Current diet is Limited junk food. Medications:   Patient is currently taking over-the-counter supplements. OTC medications include: See medication list.. Patient is able to manage medications. Activities of Daily Living (ADLs)/Instrumental Activities of Daily Living (IADLs):   Walk and transfer into and out of bed and chair?: Yes  Dress and groom yourself?: Yes    Bathe or shower yourself?: Yes    Feed yourself? Yes  Do your laundry/housekeeping?: Yes  Manage your money, pay your bills and track your expenses?: Yes  Make your own meals?: Yes    Do your own shopping?: Yes    Previous Hospitalizations:   Any hospitalizations or ED visits within the last 12 months?: Yes    How many hospitalizations have you had in the last year?: 1-2    Hospitalization Comments: I fell in the bathroom in May of 2023. Advance Care Planning:   Living will: Yes    Durable POA for healthcare:  Yes    Advanced directive: Yes    Advanced directive counseling given: Yes    Five wishes given: Yes      Cognitive Screening:   Provider or family/friend/caregiver concerned regarding cognition?: No    PREVENTIVE SCREENINGS      Cardiovascular Screening:    General: History Lipid Disorder and Screening Current      Diabetes Screening:     General: History Diabetes and Screening Current      Colorectal Cancer Screening:     General: Screening Current      Breast Cancer Screening:     General: Screening Current      Cervical Cancer Screening:    General: Screening Not Indicated      Osteoporosis Screening:    General: History Osteoporosis      Abdominal Aortic Aneurysm (AAA) Screening:        General: Screening Not Indicated      Lung Cancer Screening:     General: Screening Not Indicated Hepatitis C Screening:    General: Screening Current    Screening, Brief Intervention, and Referral to Treatment (SBIRT)    Screening  Typical number of drinks in a day: 1  Typical number of drinks in a week: 6  Interpretation: Low risk drinking behavior. AUDIT-C Screenin) How often did you have a drink containing alcohol in the past year? 4 or more times a week  2) How many drinks did you have on a typical day when you were drinking in the past year? 1 to 2  3) How often did you have 6 or more drinks on one occasion in the past year? never    AUDIT-C Score: 4  Interpretation: Score 3-12 (female): POSITIVE screen for alcohol misuse    AUDIT Screenin) How often during the last year have you found that you were not able to stop drinking once you had started? 0 - never  5) How often during the last year have you failed to do what was normally expected from you because of drinking? 0 - never  6) How often during the last year have you needed a first drink in the morning to get yourself going after a heavy drinking session? 0 - never  7) How often during the last year have you had a feeling of guilt or remorse after drinking? 0 - never  8) How often during the last year have you been unable to remember what happened the night before because you had been drinking? 0 - never  9) Have you or someone else been injured as a result of your drinking? 0 - no  10) Has a relative or friend or a doctor or another health worker been concerned about your drinking or suggested you cut down? 0 - no    AUDIT Score: 4  Interpretation: Low risk alcohol consumption    Single Item Drug Screening:  How often have you used an illegal drug (including marijuana) or a prescription medication for non-medical reasons in the past year? never    Single Item Drug Screen Score: 0  Interpretation: Negative screen for possible drug use disorder    Brief Intervention  Alcohol & drug use screenings were reviewed.  No concerns regarding substance use disorder identified. Other Counseling Topics:   Car/seat belt/driving safety, skin self-exam, sunscreen and regular weightbearing exercise and calcium and vitamin D intake. No results found. Physical Exam:     /60 (BP Location: Left arm, Patient Position: Sitting, Cuff Size: Large)   Pulse 70   Temp 98.2 °F (36.8 °C)   Resp 18   Ht 5' 4" (1.626 m)   Wt 104 kg (229 lb)   BMI 39.31 kg/m²     Physical Exam  Vitals and nursing note reviewed. Constitutional:       General: She is not in acute distress. Appearance: She is obese. Cardiovascular:      Rate and Rhythm: Normal rate and regular rhythm. Pulmonary:      Effort: Pulmonary effort is normal. No respiratory distress. Abdominal:      General: Bowel sounds are normal.      Palpations: Abdomen is soft. Tenderness: There is no abdominal tenderness. Musculoskeletal:      Cervical back: Neck supple. Skin:     General: Skin is warm and dry. Coloration: Skin is not jaundiced. Neurological:      General: No focal deficit present. Mental Status: She is alert and oriented to person, place, and time. Cranial Nerves: No cranial nerve deficit.    Psychiatric:         Mood and Affect: Mood normal.          Jonh Bobby MD

## 2023-08-15 NOTE — TELEPHONE ENCOUNTER
Upon review of the In Basket request and the patient's chart, initial outreach has been made via fax to facility. Please see Contacts section for details.      Thank you  Zoila Shone, MA

## 2023-08-15 NOTE — PATIENT INSTRUCTIONS
Medicare Preventive Visit Patient Instructions  Thank you for completing your Welcome to Medicare Visit or Medicare Annual Wellness Visit today. Your next wellness visit will be due in one year (8/15/2024). The screening/preventive services that you may require over the next 5-10 years are detailed below. Some tests may not apply to you based off risk factors and/or age. Screening tests ordered at today's visit but not completed yet may show as past due. Also, please note that scanned in results may not display below. Preventive Screenings:  Service Recommendations Previous Testing/Comments   Colorectal Cancer Screening  * Colonoscopy    * Fecal Occult Blood Test (FOBT)/Fecal Immunochemical Test (FIT)  * Fecal DNA/Cologuard Test  * Flexible Sigmoidoscopy Age: 43-73 years old   Colonoscopy: every 10 years (may be performed more frequently if at higher risk)  OR  FOBT/FIT: every 1 year  OR  Cologuard: every 3 years  OR  Sigmoidoscopy: every 5 years  Screening may be recommended earlier than age 39 if at higher risk for colorectal cancer. Also, an individualized decision between you and your healthcare provider will decide whether screening between the ages of 77-80 would be appropriate. Colonoscopy: 06/22/2022  FOBT/FIT: Not on file  Cologuard: 04/19/2022  Sigmoidoscopy: Not on file          Breast Cancer Screening Age: 36 years old  Frequency: every 1-2 years  Not required if history of left and right mastectomy Mammogram: 05/10/2022    Screening Current   Cervical Cancer Screening Between the ages of 21-29, pap smear recommended once every 3 years. Between the ages of 32-69, can perform pap smear with HPV co-testing every 5 years.    Recommendations may differ for women with a history of total hysterectomy, cervical cancer, or abnormal pap smears in past. Pap Smear: Not on file    Screening Not Indicated   Hepatitis C Screening Once for adults born between 1945 and 1965  More frequently in patients at high risk for Hepatitis C Hep C Antibody: 08/03/2020    Screening Current   Diabetes Screening 1-2 times per year if you're at risk for diabetes or have pre-diabetes Fasting glucose: No results in last 5 years (No results in last 5 years)  A1C: 5.8 % (8/9/2023)  Screening Not Indicated  History Diabetes   Cholesterol Screening Once every 5 years if you don't have a lipid disorder. May order more often based on risk factors. Lipid panel: 08/09/2023    Screening Not Indicated  History Lipid Disorder     Other Preventive Screenings Covered by Medicare:  1. Abdominal Aortic Aneurysm (AAA) Screening: covered once if your at risk. You're considered to be at risk if you have a family history of AAA. 2. Lung Cancer Screening: covers low dose CT scan once per year if you meet all of the following conditions: (1) Age 48-67; (2) No signs or symptoms of lung cancer; (3) Current smoker or have quit smoking within the last 15 years; (4) You have a tobacco smoking history of at least 20 pack years (packs per day multiplied by number of years you smoked); (5) You get a written order from a healthcare provider. 3. Glaucoma Screening: covered annually if you're considered high risk: (1) You have diabetes OR (2) Family history of glaucoma OR (3)  aged 48 and older OR (3)  American aged 72 and older  3. Osteoporosis Screening: covered every 2 years if you meet one of the following conditions: (1) You're estrogen deficient and at risk for osteoporosis based off medical history and other findings; (2) Have a vertebral abnormality; (3) On glucocorticoid therapy for more than 3 months; (4) Have primary hyperparathyroidism; (5) On osteoporosis medications and need to assess response to drug therapy. · Last bone density test (DXA Scan): 03/17/2022.  5. HIV Screening: covered annually if you're between the age of 15-65.  Also covered annually if you are younger than 13 and older than 72 with risk factors for HIV infection. For pregnant patients, it is covered up to 3 times per pregnancy. Immunizations:  Immunization Recommendations   Influenza Vaccine Annual influenza vaccination during flu season is recommended for all persons aged >= 6 months who do not have contraindications   Pneumococcal Vaccine   * Pneumococcal conjugate vaccine = PCV13 (Prevnar 13), PCV15 (Vaxneuvance), PCV20 (Prevnar 20)  * Pneumococcal polysaccharide vaccine = PPSV23 (Pneumovax) Adults 20-63 years old: 1-3 doses may be recommended based on certain risk factors  Adults 72 years old: 1-2 doses may be recommended based off what pneumonia vaccine you previously received   Hepatitis B Vaccine 3 dose series if at intermediate or high risk (ex: diabetes, end stage renal disease, liver disease)   Tetanus (Td) Vaccine - COST NOT COVERED BY MEDICARE PART B Following completion of primary series, a booster dose should be given every 10 years to maintain immunity against tetanus. Td may also be given as tetanus wound prophylaxis. Tdap Vaccine - COST NOT COVERED BY MEDICARE PART B Recommended at least once for all adults. For pregnant patients, recommended with each pregnancy. Shingles Vaccine (Shingrix) - COST NOT COVERED BY MEDICARE PART B  2 shot series recommended in those aged 48 and above     Health Maintenance Due:      Topic Date Due   • Breast Cancer Screening: Mammogram  05/10/2023   • DXA SCAN  03/17/2024   • Hepatitis C Screening  Completed   • Colorectal Cancer Screening  Discontinued     Immunizations Due:      Topic Date Due   • COVID-19 Vaccine (5 - Pfizer series) 01/08/2023   • Influenza Vaccine (1) 09/01/2023     Advance Directives   What are advance directives? Advance directives are legal documents that state your wishes and plans for medical care. These plans are made ahead of time in case you lose your ability to make decisions for yourself.  Advance directives can apply to any medical decision, such as the treatments you want, and if you want to donate organs. What are the types of advance directives? There are many types of advance directives, and each state has rules about how to use them. You may choose a combination of any of the following:  · Living will: This is a written record of the treatment you want. You can also choose which treatments you do not want, which to limit, and which to stop at a certain time. This includes surgery, medicine, IV fluid, and tube feedings. · Durable power of  for healthcare [facility]): This is a written record that states who you want to make healthcare choices for you when you are unable to make them for yourself. This person, called a proxy, is usually a family member or a friend. You may choose more than 1 proxy. · Do not resuscitate (DNR) order:  A DNR order is used in case your heart stops beating or you stop breathing. It is a request not to have certain forms of treatment, such as CPR. A DNR order may be included in other types of advance directives. · Medical directive: This covers the care that you want if you are in a coma, near death, or unable to make decisions for yourself. You can list the treatments you want for each condition. Treatment may include pain medicine, surgery, blood transfusions, dialysis, IV or tube feedings, and a ventilator (breathing machine). · Values history: This document has questions about your views, beliefs, and how you feel and think about life. This information can help others choose the care that you would choose. Why are advance directives important? An advance directive helps you control your care. Although spoken wishes may be used, it is better to have your wishes written down. Spoken wishes can be misunderstood, or not followed. Treatments may be given even if you do not want them. An advance directive may make it easier for your family to make difficult choices about your care.    Fall Prevention    Fall prevention  includes ways to make your home and other areas safer. It also includes ways you can move more carefully to prevent a fall. Health conditions that cause changes in your blood pressure, vision, or muscle strength and coordination may increase your risk for falls. Medicines may also increase your risk for falls if they make you dizzy, weak, or sleepy. Fall prevention tips:   · Stand or sit up slowly. · Use assistive devices as directed. · Wear shoes that fit well and have soles that . · Wear a personal alarm. · Stay active. · Manage your medical conditions. Home Safety Tips:  · Add items to prevent falls in the bathroom. · Keep paths clear. · Install bright lights in your home. · Keep items you use often on shelves within reach. · Paint or place reflective tape on the edges of your stairs. Urinary Incontinence   Urinary incontinence (UI)  is when you lose control of your bladder. UI develops because your bladder cannot store or empty urine properly. The 3 most common types of UI are stress incontinence, urge incontinence, or both. Medicines:   · May be given to help strengthen your bladder control. Report any side effects of medication to your healthcare provider. Do pelvic muscle exercises often:  Your pelvic muscles help you stop urinating. Squeeze these muscles tight for 5 seconds, then relax for 5 seconds. Gradually work up to squeezing for 10 seconds. Do 3 sets of 15 repetitions a day, or as directed. This will help strengthen your pelvic muscles and improve bladder control. Train your bladder:  Go to the bathroom at set times, such as every 2 hours, even if you do not feel the urge to go. You can also try to hold your urine when you feel the urge to go. For example, hold your urine for 5 minutes when you feel the urge to go. As that becomes easier, hold your urine for 10 minutes. Self-care:   · Keep a UI record. Write down how often you leak urine and how much you leak.  Make a note of what you were doing when you leaked urine. · Drink liquids as directed. You may need to limit the amount of liquid you drink to help control your urine leakage. Do not drink any liquid right before you go to bed. Limit or do not have drinks that contain caffeine or alcohol. · Prevent constipation. Eat a variety of high-fiber foods. Good examples are high-fiber cereals, beans, vegetables, and whole-grain breads. Walking is the best way to trigger your intestines to have a bowel movement. · Exercise regularly and maintain a healthy weight. Weight loss and exercise will decrease pressure on your bladder and help you control your leakage. · Use a catheter as directed  to help empty your bladder. A catheter is a tiny, plastic tube that is put into your bladder to drain your urine. · Go to behavior therapy as directed. Behavior therapy may be used to help you learn to control your urge to urinate. Weight Management   Why it is important to manage your weight:  Being overweight increases your risk of health conditions such as heart disease, high blood pressure, type 2 diabetes, and certain types of cancer. It can also increase your risk for osteoarthritis, sleep apnea, and other respiratory problems. Aim for a slow, steady weight loss. Even a small amount of weight loss can lower your risk of health problems. How to lose weight safely:  A safe and healthy way to lose weight is to eat fewer calories and get regular exercise. You can lose up about 1 pound a week by decreasing the number of calories you eat by 500 calories each day. Healthy meal plan for weight management:  A healthy meal plan includes a variety of foods, contains fewer calories, and helps you stay healthy. A healthy meal plan includes the following:  · Eat whole-grain foods more often. A healthy meal plan should contain fiber. Fiber is the part of grains, fruits, and vegetables that is not broken down by your body.  Whole-grain foods are healthy and provide extra fiber in your diet. Some examples of whole-grain foods are whole-wheat breads and pastas, oatmeal, brown rice, and bulgur. · Eat a variety of vegetables every day. Include dark, leafy greens such as spinach, kale, angela greens, and mustard greens. Eat yellow and orange vegetables such as carrots, sweet potatoes, and winter squash. · Eat a variety of fruits every day. Choose fresh or canned fruit (canned in its own juice or light syrup) instead of juice. Fruit juice has very little or no fiber. · Eat low-fat dairy foods. Drink fat-free (skim) milk or 1% milk. Eat fat-free yogurt and low-fat cottage cheese. Try low-fat cheeses such as mozzarella and other reduced-fat cheeses. · Choose meat and other protein foods that are low in fat. Choose beans or other legumes such as split peas or lentils. Choose fish, skinless poultry (chicken or turkey), or lean cuts of red meat (beef or pork). Before you cook meat or poultry, cut off any visible fat. · Use less fat and oil. Try baking foods instead of frying them. Add less fat, such as margarine, sour cream, regular salad dressing and mayonnaise to foods. Eat fewer high-fat foods. Some examples of high-fat foods include french fries, doughnuts, ice cream, and cakes. · Eat fewer sweets. Limit foods and drinks that are high in sugar. This includes candy, cookies, regular soda, and sweetened drinks. Exercise:  Exercise at least 30 minutes per day on most days of the week. Some examples of exercise include walking, biking, dancing, and swimming. You can also fit in more physical activity by taking the stairs instead of the elevator or parking farther away from stores. Ask your healthcare provider about the best exercise plan for you. Alcohol Use and Your Health    Drinking too much can harm your health. Excessive alcohol use leads to about 88,000 death in the [facility] each year, and shortens the life of those who diet by almost 30 years. Further, excessive drinking cost the economy $249 billion in 2010. Most excessive drinkers are not alcohol dependent. Excessive alcohol use has immediate effects that increase the risk of many harmful health conditions. These are most often the result of binge drinking. Over time, excessive alcohol use can lead to the development of chronic diseases and other series health problems. What is considered a "drink"? Excessive alcohol use includes:  · Binge Drinking: For women, 4 or more drinks consumed on one occasion. For men, 5 or more drinks consumed on one occasion. · Heavy Drinking: For women, 8 or more drinks per week. For men, 15 or more drinks per week  · Any alcohol used by pregnant women  · Any alcohol used by those under the age of 21 years    If you choose to drink, do so in moderation:  · Do not drink at all if you are under the age of 24, or if you are or may be pregnant, or have health problems that could be made worse by drinking.   · For women, up to 1 drink per day  · For men, up to 2 drinks a day    No one should begin drinking or drink more frequently based on potential health benefits    Short-Term Health Risks:  · Injuries: motor vehicle crashes, falls, drownings, burns  · Violence: homicide, suicide, sexual assault, intimate partner violence  · Alcohol poisoning  · Reproductive health: risky sexual behaviors, unintended prengnacy, sexually transmitted diseases, miscarriage, stillbirth, fetal alcohol syndrome    Long-Term Health Risks:  · Chronic diseases: high blood pressure, heart disease, stroke, liver disease, digestive problems  · Cancers: breast, mouth and throat, liver, colon  · Learning and memory problems: dementia, poor school performance  · Mental health: depression, anxiety, insomnia  · Social problems: lost productivity, family problems, unemployment  · Alcohol dependence    For support and more information:  · Substance [facility] Mental [facility] [address], [place] [address]  Web Address: https://[employer]/    · Alcoholics Anonymous        Web Address: http://www.vivas.info/    https://www.cdc.gov/alcohol/fact-sheets/alcohol-use.htm     © [employer] 2018 Information is for End User's use only and may not be sold, redistributed or otherwise used for commercial purposes.  All illustrations and images included in CareNotes® are the copyrighted property of [employer] or [address] [place] [address]

## 2023-08-15 NOTE — LETTER
Diabetic Eye Exam Form    Date Requested: 08/15/23  Patient: Valeriano Enriquez  Patient : [id number]   Referring Provider: Yossi Brizuela MD      DIABETIC Eye Exam Date _______________________________      Type of Exam MUST be documented for Diabetic Eye Exams. Please CHECK ONE. Retinal Exam       Dilated Retinal Exam       OCT       Optomap-Iris Exam      Fundus Photography       Left Eye - Please check Retinopathy or No Retinopathy        Exam did show retinopathy    Exam did not show retinopathy       Right Eye - Please check Retinopathy or No Retinopathy       Exam did show retinopathy    Exam did not show retinopathy       Comments __________________________________________________________    Practice Providing Exam ______________________________________________    Exam Performed By (print name) _______________________________________      Provider Signature ___________________________________________________      These reports are needed for  compliance. Please fax this completed form and a copy of the Diabetic Eye Exam report to our office located at [address] [place] [address] as soon as possible via Fax [phone] attention [place]: Phone [phone]  We thank you for your assistance in treating our mutual patient.

## 2023-08-15 NOTE — TELEPHONE ENCOUNTER
----- Message from Yang Lynn sent at 8/15/2023  9:34 AM EDT -----  Regarding: dm eye exam  08/15/23 9:34 AM    Hello, our patient attached above has had dm eye completed/performed. Please assist in updating the patient chart by Ronit Mart in [address] [place], [place] [address]The date of service is 7/23.     Thank you,  Veronique RODRIGUEZ

## 2023-08-16 LAB — MICROALBUMIN UR-MCNC: 3.8 UG/ML

## 2023-08-16 NOTE — TELEPHONE ENCOUNTER
Upon review of the In Basket request we were able to locate, review, and update the patient chart as requested for Diabetic Eye Exam.    Any additional questions or concerns should be emailed to the Practice Liaisons via the appropriate education email address, please do not reply via In Basket.     Thank you  Janessa Beckman MA

## 2023-08-21 PROBLEM — S89.92XA INJURY OF LEFT LEG: Status: ACTIVE | Noted: 2023-08-21

## 2023-09-26 ENCOUNTER — TELEPHONE (OUTPATIENT)
Dept: FAMILY MEDICINE CLINIC | Facility: CLINIC | Age: 78
End: 2023-09-26

## 2023-09-26 NOTE — TELEPHONE ENCOUNTER
LMOM home and cell#  to reschedule tomorrow's appointment as Dr Keyla Schneider will not be in office.

## 2023-09-27 DIAGNOSIS — R06.2 WHEEZE: ICD-10-CM

## 2023-09-27 DIAGNOSIS — J45.909 UNCOMPLICATED ASTHMA, UNSPECIFIED ASTHMA SEVERITY, UNSPECIFIED WHETHER PERSISTENT: ICD-10-CM

## 2023-09-27 RX ORDER — IPRATROPIUM BROMIDE AND ALBUTEROL SULFATE 2.5; .5 MG/3ML; MG/3ML
SOLUTION RESPIRATORY (INHALATION)
Qty: 270 ML | Refills: 0 | Status: SHIPPED | OUTPATIENT
Start: 2023-09-27

## 2023-09-29 ENCOUNTER — TELEPHONE (OUTPATIENT)
Age: 78
End: 2023-09-29

## 2023-09-29 NOTE — TELEPHONE ENCOUNTER
ipratropium-albuterol (DUO-NEB) 0.5-2.5 mg/3 mL nebulizer solution Prior Auth Denied. Please see denial letter in media.

## 2023-10-02 ENCOUNTER — OFFICE VISIT (OUTPATIENT)
Dept: FAMILY MEDICINE CLINIC | Facility: CLINIC | Age: 78
End: 2023-10-02
Payer: MEDICARE

## 2023-10-02 VITALS
SYSTOLIC BLOOD PRESSURE: 138 MMHG | WEIGHT: 188 LBS | HEART RATE: 88 BPM | TEMPERATURE: 98.7 F | BODY MASS INDEX: 32.1 KG/M2 | RESPIRATION RATE: 16 BRPM | HEIGHT: 64 IN | DIASTOLIC BLOOD PRESSURE: 72 MMHG

## 2023-10-02 DIAGNOSIS — F41.9 ANXIETY: ICD-10-CM

## 2023-10-02 DIAGNOSIS — J44.89 ASTHMA-COPD OVERLAP SYNDROME: ICD-10-CM

## 2023-10-02 DIAGNOSIS — I38 VALVULAR HEART DISEASE: ICD-10-CM

## 2023-10-02 DIAGNOSIS — R73.03 PREDIABETES: ICD-10-CM

## 2023-10-02 DIAGNOSIS — I73.9 PAD (PERIPHERAL ARTERY DISEASE) (HCC): ICD-10-CM

## 2023-10-02 DIAGNOSIS — Z01.818 PREOP EXAMINATION: Primary | ICD-10-CM

## 2023-10-02 PROBLEM — S60.511A INFECTED ABRASION OF RIGHT HAND: Status: RESOLVED | Noted: 2018-01-27 | Resolved: 2023-10-02

## 2023-10-02 PROBLEM — R30.0 DYSURIA: Status: RESOLVED | Noted: 2021-07-02 | Resolved: 2023-10-02

## 2023-10-02 PROBLEM — I35.0 AORTIC VALVE STENOSIS: Status: ACTIVE | Noted: 2022-05-26

## 2023-10-02 PROBLEM — E07.89 THYROID FULLNESS: Status: RESOLVED | Noted: 2022-04-03 | Resolved: 2023-10-02

## 2023-10-02 PROBLEM — L08.9 INFECTED ABRASION OF RIGHT HAND: Status: RESOLVED | Noted: 2018-01-27 | Resolved: 2023-10-02

## 2023-10-02 PROBLEM — S63.501A SPRAIN OF RIGHT WRIST: Status: RESOLVED | Noted: 2020-06-30 | Resolved: 2023-10-02

## 2023-10-02 PROBLEM — J45.909 MODERATE ASTHMA: Status: RESOLVED | Noted: 2023-05-08 | Resolved: 2023-10-02

## 2023-10-02 LAB
SL AMB  POCT GLUCOSE, UA: NORMAL
SL AMB LEUKOCYTE ESTERASE,UA: NORMAL
SL AMB POCT BILIRUBIN,UA: NORMAL
SL AMB POCT BLOOD,UA: NORMAL
SL AMB POCT CLARITY,UA: CLEAR
SL AMB POCT COLOR,UA: YELLOW
SL AMB POCT KETONES,UA: NORMAL
SL AMB POCT NITRITE,UA: NORMAL
SL AMB POCT PH,UA: 5
SL AMB POCT SPECIFIC GRAVITY,UA: 1.01
SL AMB POCT URINE PROTEIN: NORMAL
SL AMB POCT UROBILINOGEN: NORMAL

## 2023-10-02 PROCEDURE — 99215 OFFICE O/P EST HI 40 MIN: CPT | Performed by: FAMILY MEDICINE

## 2023-10-02 PROCEDURE — 81003 URINALYSIS AUTO W/O SCOPE: CPT | Performed by: FAMILY MEDICINE

## 2023-10-02 RX ORDER — ERYTHROMYCIN 5 MG/G
OINTMENT OPHTHALMIC
COMMUNITY
Start: 2023-09-05

## 2023-10-02 RX ORDER — TRIAMCINOLONE ACETONIDE 1 MG/G
CREAM TOPICAL
COMMUNITY
Start: 2023-08-29

## 2023-10-02 RX ORDER — ALPRAZOLAM 0.25 MG/1
0.25 TABLET ORAL 2 TIMES DAILY PRN
Qty: 30 TABLET | Refills: 0 | Status: SHIPPED | OUTPATIENT
Start: 2023-10-02

## 2023-10-02 NOTE — PROGRESS NOTES
Assessment/Plan:    1. Preop examination  Assessment & Plan:  PATS reviewed  Pulm clearance thru Dr. Noemi Prader reviewed  Cardio clearance thru Dr. Ronel Mullen. Rammaddiei--pending    Pt medically cleared pending cardio clearance. Orders:  -     POCT urine dip auto non-scope    2. PAD (peripheral artery disease) ([address])    3. Prediabetes  Assessment & Plan:  FxX1d=3.8%    Orders:  -     Microalbumin,Urine    4. Asthma-COPD overlap syndrome  Assessment & Plan:  Stable--pulm clearance charted--Dr. Green      5. Valvular heart disease    6. Anxiety  -     ALPRAZolam (XANAX) 0.25 mg tablet; Take 1 tablet (0.25 mg total) by mouth 2 (two) times a day as needed for anxiety    7. BMI 32.0-32.9,adult            Subjective:      Patient ID: Felicia Pearl is a 70 y.o. female. Chief Complaint   Patient presents with   • Pre-op Exam     Dr Simon Argueta for angioplasty        HPI     76yo pt in for pre-op exam for right lower extremity angiogram/possible angioplasty/possible kissing iliac stenting, Date of surgery: 10/5/2023  Surgeon: Dr. Carmen Hollingsworth. Indication: leg pain. No acute c/o at time of visit and no known recent illness exposures. Pt reports no adverse reaction to any prior anesthesia received including one or more of the following-general, epidural and spinal.    Pertinent medical and surgical history reviewed in problem lists. Pt able to walk 4 blocks or 2 flights of stairs without any concerning cardiovascular symptoms. Pt denies symptoms of easy bruising/bleeding/chest pain/palitations/new or changing edema/cough/wheezing/dyspnea. Pt denies excessive alcohol intake, tobacco or illicit drug use. The patient's home  Living situation is secure and supportive and there are no post-op concerns in this regard.   The following portions of the patient's history were reviewed and updated as appropriate: allergies, current medications, past family history, past medical history, past social history, past surgical history and problem list.    Past Medical History:   Diagnosis Date   • Allergic rhinitis 08/25/2008   • Arthritis     LAST ASSESSED: [id number]   • Asthma    • Asthma with acute exacerbation 06/24/2009   • Asthma-COPD overlap syndrome    • Bilateral renal cysts     RESOLVED: [id number]   • COPD (chronic obstructive pulmonary disease) ([address])    • Eczema     LAST ASSESSED: 56HMR5996   • Hiatal hernia     LAST ASSESSED: 49QMR7156   • Hirsutism 09/26/2011   • Osteopenia of multiple sites 9/5/2012    Procedure/Onset: 05/22/2009   • Pleurisy     LAST ASSESSED: [id number]   • Pneumonia     LAST ASSESSED: 82YCR7296   • Umbilical hernia 35/17/5211     Past Surgical History:   Procedure Laterality Date   • APPENDECTOMY      LAST ASSESSED: [id number]   • CYSTECTOMY      OVARIAN   • INCISIONAL BREAST BIOPSY     • TONSILLECTOMY         Review of Systems   Constitutional: Positive for fatigue. Negative for fever. Respiratory: Positive for wheezing (intermittent). TIERNEY   Cardiovascular: Negative for chest pain. Musculoskeletal: Positive for arthralgias. Allergic/Immunologic: Positive for environmental allergies. Neurological: Negative. Psychiatric/Behavioral: Positive for sleep disturbance.          Current Outpatient Medications   Medication Sig Dispense Refill   • albuterol (ProAir HFA) 90 mcg/act inhaler Inhale 2 puffs every 4 (four) hours as needed for wheezing 18 g 3   • ALPRAZolam (XANAX) 0.25 mg tablet Take 1 tablet (0.25 mg total) by mouth 2 (two) times a day as needed for anxiety 30 tablet 0   • amLODIPine (NORVASC) 5 mg tablet TAKE 1 TABLET BY MOUTH  TWICE DAILY 180 tablet 3   • b complex vitamins capsule Take 1 capsule by mouth       • betamethasone dipropionate (DIPROSONE) 0.05 % cream Apply topically 2 (two) times a day 30 g 5   • Biotin 5000 MCG CAPS Take 2 capsules by mouth daily       • CALCIUM PO Take 600 mg by mouth       • cetirizine (ZyrTEC) 10 mg tablet      • Cholecalciferol (VITAMIN D3 PO) 1 capsule every 24 hours     • clotrimazole-betamethasone (LOTRISONE) 1-0.05 % cream Apply topically 2 (two) times a day 45 g 1   • ipratropium-albuterol (DUO-NEB) 0.5-2.5 mg/3 mL nebulizer solution USE 1 AMPULE IN NEBULIZER EVERY 6 HOURS AS NEEDED FOR WHEEZING FOR SHORTNESS OF BREATH 270 mL 0   • losartan (COZAAR) 100 MG tablet Take 100 mg by mouth daily      • Magnesium 400 MG TABS Take by mouth     • montelukast (SINGULAIR) 10 mg tablet TAKE 1 TABLET BY MOUTH  DAILY AT BEDTIME 90 tablet 3   • rosuvastatin (CRESTOR) 10 MG tablet      • Trelegy Ellipta 200-62.5-25 MCG/INH AEPB inhaler      • triamcinolone (KENALOG) 0.1 % cream APPLY CREAM EXTERNALLY TO AFFECTED AREA TWICE DAILY AS NEEDED     • VITAMIN B1-B12 PO Take by mouth     • erythromycin (ILOTYCIN) ophthalmic ointment APPLY OINTMENT INTO EACH EYE AT BEDTIME     • triamcinolone (KENALOG) 0.1 % cream APPLY CREAM EXTERNALLY TO AFFECTED AREA TWICE DAILY AS NEEDED       No current facility-administered medications for this visit. Allergies   Allergen Reactions   • Nuts - Food Allergy Other (See Comments), Hives and Anaphylaxis     [place] nuts, hazelnut  [place] nuts, hazelnut  [place] nuts, hazelnut   • Amoxicillin-Pot Clavulanate Other (See Comments)   • Aspirin GI Bleeding     Stomach bleeding  Stomach bleeding  Stomach bleeding   • Bee Pollen Other (See Comments)   • Cimetidine      Reaction Date: 25Aug2008;    • Ciprofloxacin Other (See Comments)     Acid reflux  Acid reflux  Reaction Date: 22Dec2011; Annotation - [id number]: pt had cramping. tc/cma   • Egg Solids, Whole Itching     Can eat egg but cannot touch. • Eggs Or Egg-Derived Products - Food Allergy Other (See Comments)   • Other Hives   • Pollen Extract    • Sulfamethoxazole-Trimethoprim Other (See Comments)     Other reaction(s):  Other (See Comments), Other (See Comments)       Immunization History   Administered Date(s) Administered   • COVID-19 PFIZER VACCINE 0.3 ML IM 02/21/2021, 03/14/2021, 10/26/2021, 11/13/2022   • Influenza Split High Dose Preservative Free IM 10/06/2021   • Influenza, recombinant, quadrivalent,injectable, preservative free 10/08/2020   • Pneumococcal Conjugate 13-Valent 01/06/2016   • Pneumococcal Polysaccharide PPV23 05/22/2013   • Tdap 12/14/2012, 05/27/2023     Objective:    /72   Pulse 88   Temp 98.7 °F (37.1 °C)   Resp 16   Ht 5' 4" (1.626 m)   Wt 85.3 kg (188 lb)   BMI 32.27 kg/m²        Physical Exam  Vitals and nursing note reviewed. Constitutional:       General: She is not in acute distress. HENT:      Mouth/Throat:      Pharynx: No oropharyngeal exudate. Eyes:      General: No scleral icterus. Cardiovascular:      Rate and Rhythm: Normal rate and regular rhythm. Pulmonary:      Effort: Pulmonary effort is normal. No respiratory distress. Comments: Few, scattered coarse rhonchi, no localization  Abdominal:      General: Bowel sounds are normal.      Palpations: Abdomen is soft. Tenderness: There is no abdominal tenderness. There is no right CVA tenderness or left CVA tenderness. Musculoskeletal:         General: Tenderness present. Cervical back: Neck supple. Skin:     General: Skin is warm and dry. Coloration: Skin is not jaundiced. Findings: No rash. Neurological:      General: No focal deficit present. Mental Status: She is alert and oriented to person, place, and time. Cranial Nerves: No cranial nerve deficit.    Psychiatric:         Mood and Affect: Mood normal.           Naima Mart MD

## 2023-10-03 LAB — MICROALBUMIN UR-MCNC: <3 UG/ML

## 2023-10-04 ENCOUNTER — TELEPHONE (OUTPATIENT)
Dept: FAMILY MEDICINE CLINIC | Facility: CLINIC | Age: 78
End: 2023-10-04

## 2023-10-04 NOTE — TELEPHONE ENCOUNTER
I spoke with Dr Temitope Pacheco office and they are cancelling patient procedure for the time being ,  but they still want clearance from you  . She is going for cardio clearance today . they will also be sending over ct results and consults .  Patient is aware of cancellation

## 2023-10-04 NOTE — ASSESSMENT & PLAN NOTE
TRAM reviewed  Pulm clearance thru Dr. Klarissa Solares reviewed  Cardio clearance thru Dr. Karan Keenan. Genesis--pending    Pt medically cleared pending cardio clearance.

## 2023-10-06 ENCOUNTER — TELEPHONE (OUTPATIENT)
Age: 78
End: 2023-10-06

## 2023-10-06 NOTE — TELEPHONE ENCOUNTER
I reviewed pts chart and the last communication that was signed was in 2019. I left a detailed message for pts  informing him that we cannot speak to him until there is an updated consent form filled out by his wife. I also informed him that Dr. Papo Don would like him to reach out to the surgeon with any concerns and that once he does Dr. Papo Don will be happy to sit with both he and his wife to go over any concerns he may have.

## 2023-10-06 NOTE — TELEPHONE ENCOUNTER
Pt spouse is calling to speak with Dr to ask some questions about wife's surgery. He does not want his wife to know that he is calling in regards to this.  His call back number is [phone]

## 2023-10-06 NOTE — TELEPHONE ENCOUNTER
Please inform cannot discuss (HIPPA). Advise further questions about the surgery may be best addressed with the surgeon with his wife present as well. I'd be happy to address any further concerns with both of them once this has been done.   thanks

## 2023-10-12 NOTE — TELEPHONE ENCOUNTER
Dr. Allen Cons office called to request the patient office note that state that she is clear for surgery.      Patient is schedule for surgery on 10/18/2023    Please Fax this information at [phone]

## 2023-10-14 PROBLEM — Z00.00 MEDICARE ANNUAL WELLNESS VISIT, SUBSEQUENT: Status: RESOLVED | Noted: 2020-06-30 | Resolved: 2023-10-14

## 2023-10-16 ENCOUNTER — TELEPHONE (OUTPATIENT)
Dept: OTHER | Facility: OTHER | Age: 78
End: 2023-10-16

## 2023-10-16 NOTE — TELEPHONE ENCOUNTER
Dr. Song Henry:    Helio Lindquist from [facility] Preadmission called asking if you can addend office note to clear patient for surgery on 10/18 after she went for a cardiac evaluation. Please fax to [phone].

## 2023-10-16 NOTE — TELEPHONE ENCOUNTER
[address] Pre Admission Deptartment  [phone] [facility] Daily  Requesting urgent Medical clearance and surgery for  Wednesday 10/18/23    They will need Jie amend the progress note from 10/2/23.    Caller states if doctor wants to see notes from Dr. Arielle Marr, please see notes from 10/04/23

## 2023-10-16 NOTE — TELEPHONE ENCOUNTER
Helio Lindquist calling for status on patient clearance for upcoming surgery.  Transfer call to the office

## 2023-10-16 NOTE — TELEPHONE ENCOUNTER
[facility] Pre Admission Deptartment  [phone] Tammi Larson  Requesting urgent Medical clearance and surgery for  Wednesday 10/18/23     vd a call from Tammi Larson requesting to know status of documentation she requested, she stated they need ASAP as the Anesthesiologist won't clear pt. For surgery  Until he reviews Dr. Sheila Woodson Notes.

## 2023-11-07 DIAGNOSIS — J45.909 UNCOMPLICATED ASTHMA, UNSPECIFIED ASTHMA SEVERITY, UNSPECIFIED WHETHER PERSISTENT: ICD-10-CM

## 2023-11-07 DIAGNOSIS — R06.2 WHEEZE: ICD-10-CM

## 2023-11-07 RX ORDER — IPRATROPIUM BROMIDE AND ALBUTEROL SULFATE 2.5; .5 MG/3ML; MG/3ML
SOLUTION RESPIRATORY (INHALATION)
Qty: 270 ML | Refills: 1 | Status: SHIPPED | OUTPATIENT
Start: 2023-11-07

## 2023-11-08 ENCOUNTER — TELEMEDICINE (OUTPATIENT)
Dept: FAMILY MEDICINE CLINIC | Facility: CLINIC | Age: 78
End: 2023-11-08
Payer: MEDICARE

## 2023-11-08 DIAGNOSIS — N28.89 LEFT KIDNEY MASS: ICD-10-CM

## 2023-11-08 DIAGNOSIS — I10 BENIGN ESSENTIAL HYPERTENSION: ICD-10-CM

## 2023-11-08 DIAGNOSIS — J15.9 BACTERIAL PNEUMONIA: Primary | ICD-10-CM

## 2023-11-08 DIAGNOSIS — J44.89 ASTHMA-COPD OVERLAP SYNDROME: ICD-10-CM

## 2023-11-08 DIAGNOSIS — I73.9 PAD (PERIPHERAL ARTERY DISEASE) (HCC): ICD-10-CM

## 2023-11-08 DIAGNOSIS — I38 VALVULAR HEART DISEASE: ICD-10-CM

## 2023-11-08 PROCEDURE — 99213 OFFICE O/P EST LOW 20 MIN: CPT | Performed by: FAMILY MEDICINE

## 2023-11-08 NOTE — PROGRESS NOTES
Virtual Regular Visit    Verification of patient location:    Patient is located at Home in the following state in which I hold an active license [place]      Assessment/Plan:    Problem List Items Addressed This Visit     Asthma-COPD overlap syndrome    Bacterial pneumonia - Primary    Benign essential hypertension    Left kidney mass    Relevant Orders    MRI abdomen w wo contrast    PAD (peripheral artery disease) (HCC)    Valvular heart disease     Vascular surgery  W Dr. Patricia Plunkett postponed  Pt on doxycycline and sputum cxs ordered by pulmonary--Dr. Green  MRI abd ordered to further evaluate left kidney mass seen incidentally on CT Chest  Medications reconciled--pt to cont as per order list  Ref given from pulm for vascular for second opinion re: tx options moving forward- pt sched with Dr. Rich Meza 11/28/23       Reason for visit is   Chief Complaint   Patient presents with   • Virtual Regular Visit   • Virtual Regular Visit          Encounter provider Ashley Gunn MD    Provider located at [address] [address]      Recent Visits  Date Type Provider Dept   11/08/23 Telemedicine Ashley Gunn MD [address] [facility] 9 recent visits within past 7 days and meeting all other requirements  Future Appointments  No visits were found meeting these conditions. Showing future appointments within next 150 days and meeting all other requirements       The patient was identified by name and date of birth. Amaury Lynn was informed that this is a telemedicine visit and that the visit is being conducted through the [employer]. She agrees to proceed. .  My office door was closed. No one else was in the room. She acknowledged consent and understanding of privacy and security of the video platform. The patient has agreed to participate and understands they can discontinue the visit at any time.     Patient is aware this is a billable service. Subjective  Adrian Filter is a 70 y.o. female follow-up recent testing. Michael Sebastian       HPI     Follow-up  Interval hx reviewed  Specialist input appreciated  Vascular surgery postponed  Pt w abnl CT chest 11/6/23--+pneum/bronchiectasis/left lung opacity  Prescribed doxycycline and sputum cxs ordered by Pulm--Dr. Francis Paulson  Pt considering second opinion re: vascular surgery-has appt w Dr. Frieda Koyanagi 11/28/23 (recommended by pulm)  Also given order today for MRI abd to further eval incidental finding of indeterminate left kidney mass seen on CT chest    Past Medical History:   Diagnosis Date   • Allergic rhinitis 08/25/2008   • Arthritis     LAST ASSESSED: [id number]   • Asthma    • Asthma with acute exacerbation 06/24/2009   • Asthma-COPD overlap syndrome    • Bilateral renal cysts     RESOLVED: [id number]   • COPD (chronic obstructive pulmonary disease) ([address])    • Eczema     LAST ASSESSED: [id number]   • Hiatal hernia     LAST ASSESSED: 73XSZ9799   • Hirsutism 09/26/2011   • Osteopenia of multiple sites 9/5/2012    Procedure/Onset: 05/22/2009   • Pleurisy     LAST ASSESSED: [id number]   • Pneumonia     LAST ASSESSED: 95YAI8640   • Umbilical hernia 38/82/4530       Past Surgical History:   Procedure Laterality Date   • APPENDECTOMY      LAST ASSESSED: [id number]   • CYSTECTOMY      OVARIAN   • INCISIONAL BREAST BIOPSY     • TONSILLECTOMY         Current Outpatient Medications   Medication Sig Dispense Refill   • albuterol (ProAir HFA) 90 mcg/act inhaler Inhale 2 puffs every 4 (four) hours as needed for wheezing 18 g 3   • ALPRAZolam (XANAX) 0.25 mg tablet Take 1 tablet (0.25 mg total) by mouth 2 (two) times a day as needed for anxiety 30 tablet 0   • amLODIPine (NORVASC) 5 mg tablet TAKE 1 TABLET BY MOUTH  TWICE DAILY 180 tablet 3   • b complex vitamins capsule Take 1 capsule by mouth       • betamethasone dipropionate (DIPROSONE) 0.05 % cream Apply topically 2 (two) times a day 30 g 5   • Biotin 5000 MCG CAPS Take 2 capsules by mouth daily       • CALCIUM PO Take 600 mg by mouth       • cetirizine (ZyrTEC) 10 mg tablet      • Cholecalciferol (VITAMIN D3 PO) 1 capsule every 24 hours     • clotrimazole-betamethasone (LOTRISONE) 1-0.05 % cream Apply topically 2 (two) times a day 45 g 1   • erythromycin (ILOTYCIN) ophthalmic ointment APPLY OINTMENT INTO EACH EYE AT BEDTIME     • ipratropium-albuterol (DUO-NEB) 0.5-2.5 mg/3 mL nebulizer solution USE 1 AMPULE IN NEBULIZER EVERY 6 HOURS AS NEEDED FOR WHEEZING FOR SHORTNESS OF BREATH 270 mL 1   • losartan (COZAAR) 100 MG tablet Take 100 mg by mouth daily      • Magnesium 400 MG TABS Take by mouth     • montelukast (SINGULAIR) 10 mg tablet TAKE 1 TABLET BY MOUTH  DAILY AT BEDTIME 90 tablet 3   • rosuvastatin (CRESTOR) 10 MG tablet      • Trelegy Ellipta 200-62.5-25 MCG/INH AEPB inhaler      • triamcinolone (KENALOG) 0.1 % cream APPLY CREAM EXTERNALLY TO AFFECTED AREA TWICE DAILY AS NEEDED     • triamcinolone (KENALOG) 0.1 % cream APPLY CREAM EXTERNALLY TO AFFECTED AREA TWICE DAILY AS NEEDED     • VITAMIN B1-B12 PO Take by mouth       No current facility-administered medications for this visit. Allergies   Allergen Reactions   • Nuts - Food Allergy Other (See Comments), Hives and Anaphylaxis     [place] nuts, hazelnut  [place] nuts, hazelnut  [place] nuts, hazelnut   • Amoxicillin-Pot Clavulanate Other (See Comments)   • Aspirin GI Bleeding     Stomach bleeding  Stomach bleeding  Stomach bleeding   • Bee Pollen Other (See Comments)   • Cimetidine      Reaction Date: 25Aug2008;    • Ciprofloxacin Other (See Comments)     Acid reflux  Acid reflux  Reaction Date: 22Dec2011; Annotation - [id number]: pt had cramping. tc/cma   • Egg Solids, Whole Itching     Can eat egg but cannot touch.    • Eggs Or Egg-Derived Products - Food Allergy Other (See Comments)   • Other Hives   • Pollen Extract    • Sulfamethoxazole-Trimethoprim Other (See Comments)     Other reaction(s): Other (See Comments), Other (See Comments)       Review of Systems   Constitutional:  Positive for fatigue. Negative for fever. Respiratory:  Positive for wheezing (intermittent). TIERNEY   Cardiovascular:  Negative for chest pain. Musculoskeletal:  Positive for arthralgias. Allergic/Immunologic: Positive for environmental allergies. Neurological: Negative. Psychiatric/Behavioral:  Positive for sleep disturbance. Physical Exam   AAOx3. Sounds congested. No audible wheeze. Speech clear, answering questions approp.     Visit Time  Total Visit Duration: 15min

## 2023-11-11 PROBLEM — N28.89 LEFT KIDNEY MASS: Status: ACTIVE | Noted: 2023-11-11

## 2023-11-11 PROBLEM — J15.9 BACTERIAL PNEUMONIA: Status: ACTIVE | Noted: 2023-11-11

## 2023-11-11 NOTE — PROGRESS NOTES
Virtual Regular Visit    Verification of patient location:    Patient is located at {[facility] Virtual Patient Location:03573} in the following state in which I hold an active license {[facility] virtual patient location:[address]367}      Assessment/Plan:    Problem List Items Addressed This Visit    None  Visit Diagnoses     Left kidney mass    -  Primary    Relevant Orders    MRI abdomen w wo contrast               Reason for visit is   Chief Complaint   Patient presents with   • Virtual Regular Visit   • Virtual Regular Visit          Encounter provider Dalton Rizvi MD    Provider located at [address] [place] [id number]      Recent Visits  Date Type Provider Dept   11/08/23 [address] [place], [place] [address] [facility] 9 recent visits within past 7 days and meeting all other requirements  Future Appointments  No visits were found meeting these conditions. Showing future appointments within next 150 days and meeting all other requirements       The patient was identified by name and date of birth. Mignon Daly was informed that this is a telemedicine visit and that the visit is being conducted through {AMB VIRTUAL VISIT IZAXJI:46809}. {Telemedicine confidentiality :21204} {Telemedicine participants:46191}  She acknowledged consent and understanding of privacy and security of the video platform. The patient has agreed to participate and understands they can discontinue the visit at any time. Patient is aware this is a billable service. Subjective  Mignon Daly is a 70 y.o. female *** .       HPI     Past Medical History:   Diagnosis Date   • Allergic rhinitis 08/25/2008   • Arthritis     LAST ASSESSED: [id number]   • Asthma    • Asthma with acute exacerbation 06/24/2009   • Asthma-COPD overlap syndrome    • Bilateral renal cysts     RESOLVED: [id number]   • COPD (chronic obstructive pulmonary disease) (HCC)    • Eczema     LAST ASSESSED: 00MPT5575   • Hiatal hernia     LAST ASSESSED: [id number]   • Hirsutism 09/26/2011   • Osteopenia of multiple sites 9/5/2012    Procedure/Onset: 05/22/2009   • Pleurisy     LAST ASSESSED: [id number]   • Pneumonia     LAST ASSESSED: 11AQM7413   • Umbilical hernia 63/16/9743       Past Surgical History:   Procedure Laterality Date   • APPENDECTOMY      LAST ASSESSED: 33SBX7603   • CYSTECTOMY      OVARIAN   • INCISIONAL BREAST BIOPSY     • TONSILLECTOMY         Current Outpatient Medications   Medication Sig Dispense Refill   • albuterol (ProAir HFA) 90 mcg/act inhaler Inhale 2 puffs every 4 (four) hours as needed for wheezing 18 g 3   • ALPRAZolam (XANAX) 0.25 mg tablet Take 1 tablet (0.25 mg total) by mouth 2 (two) times a day as needed for anxiety 30 tablet 0   • amLODIPine (NORVASC) 5 mg tablet TAKE 1 TABLET BY MOUTH  TWICE DAILY 180 tablet 3   • b complex vitamins capsule Take 1 capsule by mouth       • betamethasone dipropionate (DIPROSONE) 0.05 % cream Apply topically 2 (two) times a day 30 g 5   • Biotin 5000 MCG CAPS Take 2 capsules by mouth daily       • CALCIUM PO Take 600 mg by mouth       • cetirizine (ZyrTEC) 10 mg tablet      • Cholecalciferol (VITAMIN D3 PO) 1 capsule every 24 hours     • clotrimazole-betamethasone (LOTRISONE) 1-0.05 % cream Apply topically 2 (two) times a day 45 g 1   • erythromycin (ILOTYCIN) ophthalmic ointment APPLY OINTMENT INTO EACH EYE AT BEDTIME     • ipratropium-albuterol (DUO-NEB) 0.5-2.5 mg/3 mL nebulizer solution USE 1 AMPULE IN NEBULIZER EVERY 6 HOURS AS NEEDED FOR WHEEZING FOR SHORTNESS OF BREATH 270 mL 1   • losartan (COZAAR) 100 MG tablet Take 100 mg by mouth daily      • Magnesium 400 MG TABS Take by mouth     • montelukast (SINGULAIR) 10 mg tablet TAKE 1 TABLET BY MOUTH  DAILY AT BEDTIME 90 tablet 3   • rosuvastatin (CRESTOR) 10 MG tablet      • Trelegy Ellipta 200-62.5-25 MCG/INH AEPB inhaler      • triamcinolone (KENALOG) 0.1 % cream APPLY CREAM EXTERNALLY TO AFFECTED AREA TWICE DAILY AS NEEDED     • triamcinolone (KENALOG) 0.1 % cream APPLY CREAM EXTERNALLY TO AFFECTED AREA TWICE DAILY AS NEEDED     • VITAMIN B1-B12 PO Take by mouth       No current facility-administered medications for this visit. Allergies   Allergen Reactions   • Nuts - Food Allergy Other (See Comments), Hives and Anaphylaxis     [place] nuts, hazelnut  [place] nuts, hazelnut  [place] nuts, hazelnut   • Amoxicillin-Pot Clavulanate Other (See Comments)   • Aspirin GI Bleeding     Stomach bleeding  Stomach bleeding  Stomach bleeding   • Bee Pollen Other (See Comments)   • Cimetidine      Reaction Date: 25Aug2008;    • Ciprofloxacin Other (See Comments)     Acid reflux  Acid reflux  Reaction Date: 22Dec2011; Annotation - [id number]: pt had cramping. tc/cma   • Egg Solids, Whole Itching     Can eat egg but cannot touch. • Eggs Or Egg-Derived Products - Food Allergy Other (See Comments)   • Other Hives   • Pollen Extract    • Sulfamethoxazole-Trimethoprim Other (See Comments)     Other reaction(s): Other (See Comments), Other (See Comments)       Review of Systems    Video Exam    There were no vitals filed for this visit.     Physical Exam     Visit Time  Total Visit Duration: ***

## 2023-12-04 ENCOUNTER — TELEPHONE (OUTPATIENT)
Dept: FAMILY MEDICINE CLINIC | Facility: CLINIC | Age: 78
End: 2023-12-04

## 2023-12-04 NOTE — TELEPHONE ENCOUNTER
----- Message from Rinku Hernandez sent at 12/1/2023  3:01 PM EST -----  Regarding: FW: Omeprazole  Contact: [phone]  Please advise patient - Dr. Abbey Novoa is out of the office today - will address when she returns Monday.   ----- Message -----  From: Cayla Hartman  Sent: 12/1/2023   2:24 PM EST  To: Joanne Shi [facility]  Subject: FW: Omeprazole                                     ----- Message -----  From: Aurora Ad"  Sent: 12/1/2023  10:07 AM EST  To: [facility]  Subject: Omeprazole                                       Hi Dr. Lara Oropeza. I have a new vascular doctor, Kwan Navarro. He comes highly recommended by Dr. Sharron Ramirez. We had a good appointment with him on Tuesday. He will do an angioplasty on my right leg on December 11. He needs me to take Plavix for 5 days before the surgery. He said I can't take Omeprazole because it interferes with the Plavix. I told him I hardly ever use the Omeprazole and haven't taken any in months. He still needs you to tell him that he can substitute Pepcid 20 mg. in place of the Plavix. Can you please contact his office and let them know that the Pepcid is okay to substitute? Thank you.   Katt Small

## 2024-01-05 ENCOUNTER — TELEPHONE (OUTPATIENT)
Dept: FAMILY MEDICINE CLINIC | Facility: CLINIC | Age: 79
End: 2024-01-05

## 2024-01-05 NOTE — TELEPHONE ENCOUNTER
"----- Message from Ana Debby sent at 1/5/2024  1:18 PM EST -----  Regarding: FW: Vascular bi-pass surgery  Contact: [phone]    ----- Message -----  From: Polly Mitchell \"Ashley\"  Sent: 1/5/2024  12:07 PM EST  To: [facility]  Subject: Vascular bi-pass surgery                         Hi Dr. Ceron.  Thank you for your message.  My surgery had to be postponed again because when I went for the pre-op bloodwork and X-rays on Wednesday my bloodwork showed something in my white cells and my chest X-ray showed something in my lungs. When I woke up yesterday I could hardly breathe.  Not sure if it was nerves or whatever was wrong in the testing.  Between Dr. Parrish and Dr. Green they postponed my surgery until January 19, but only if cleared by Dr. Green.  Dr. Green put me on a 10 day course of Levofloxicin, which I hate but will take to get better.  Johnny thinks he and I should come and see you for a short conference appointment just to go over everything and get your opinion.  Your office said you are booked for the next 2 weeks.  We would like to come in some time this week if you could find the time.  Please let me know. Thank you.  Ashley Mitchell    "

## 2024-01-10 PROBLEM — J15.9 BACTERIAL PNEUMONIA: Status: RESOLVED | Noted: 2023-11-11 | Resolved: 2024-01-10

## 2024-01-10 PROBLEM — J18.9 PNEUMONIA: Status: RESOLVED | Noted: 2022-02-23 | Resolved: 2024-01-10

## 2024-01-15 ENCOUNTER — TELEPHONE (OUTPATIENT)
Dept: FAMILY MEDICINE CLINIC | Facility: CLINIC | Age: 79
End: 2024-01-15

## 2024-01-15 NOTE — TELEPHONE ENCOUNTER
"----- Message from Ana Debby sent at 1/15/2024  9:51 AM EST -----  Regarding: FW: Vascular bi-pass surgery  Contact: [phone]    ----- Message -----  From: Polly Mitchell \"Ashley\"  Sent: 1/12/2024  10:36 PM EST  To: [facility]  Subject: Vascular bi-pass surgery                         Hi Dr. Ceron. I haven’t heard from you or your office about an appointment this week. Now the week has passed and I only have Monday and Tuesday afternoons Gato. 15 & 16 before I have to go once again for pre surgery testing. Is there anything you can do for me? Thank you. Ashley Mitchell     "

## 2024-01-16 NOTE — TELEPHONE ENCOUNTER
Spoke w pt--has preop labs/cxr sched for 1/17 and follow-up w Dr. Green-pulm 1/18 for final clearance.  Testing being done at [facility]-#[phone].  Await results and further pulm recommendations.  Pt sched for tentative appt w me 1/17 2pm in event of any further changes/concerns to be addressed prior.

## 2024-01-16 NOTE — TELEPHONE ENCOUNTER
Please keep pt on schedule so we can call for results at that time and address w pt--if not in yet will call pt at end of day if back by then--thanks

## 2024-01-16 NOTE — TELEPHONE ENCOUNTER
Patient called and stated she spoke with Dr. Ceron last night.  She will be going for labs and a chest xray in the morning.  She has an appointment scheduled for tomorrow at 2 pm and not sure that the results will be back in time.  She would like to know if she should keep the appointment, reschedule until after the results are available, or if she could have a phone call once the results are available instead.  Patient will be going in for surgery on Friday, 1/19.  Patient can be reached on her mobile if she is out tomorrow and does not  the home phone.

## 2024-01-19 ENCOUNTER — PATIENT MESSAGE (OUTPATIENT)
Dept: FAMILY MEDICINE CLINIC | Facility: CLINIC | Age: 79
End: 2024-01-19

## 2024-01-23 DIAGNOSIS — J45.909 MODERATE ASTHMA WITHOUT COMPLICATION, UNSPECIFIED WHETHER PERSISTENT: ICD-10-CM

## 2024-01-23 DIAGNOSIS — I10 ESSENTIAL HYPERTENSION: ICD-10-CM

## 2024-01-23 RX ORDER — MONTELUKAST SODIUM 10 MG/1
TABLET ORAL
Qty: 90 TABLET | Refills: 1 | Status: SHIPPED | OUTPATIENT
Start: 2024-01-23

## 2024-01-23 RX ORDER — AMLODIPINE BESYLATE 5 MG/1
TABLET ORAL
Qty: 180 TABLET | Refills: 1 | Status: SHIPPED | OUTPATIENT
Start: 2024-01-23

## 2024-02-01 ENCOUNTER — TELEPHONE (OUTPATIENT)
Dept: FAMILY MEDICINE CLINIC | Facility: CLINIC | Age: 79
End: 2024-02-01

## 2024-02-01 NOTE — TELEPHONE ENCOUNTER
"----- Message from Negrita Fernando sent at 2/1/2024 11:48 AM EST -----  Regarding: FW: Covid  Contact: [phone]    ----- Message -----  From: Polly Mitchell \"Ashley\"  Sent: 2/1/2024  11:40 AM EST  To: [facility]  Subject: Covid                                            Page 2.    Then the next day Johnny got Covid. I waited 2 days and then tested myself and I was positive too.  I got Paxlovid from Dr. Green's Office.  I just finished it yesteday.  I wasn't too bad.  I was mostly tiredf and had a horrible stuffed up head that wouldn't get better.  It's finally letting me blow my nose and hopefully everything will come out.I took a couple DayQuils and maybe they helped a bit. Is there anything else you think I should do? I always like to get your opinion.  Thank you. Ashley"

## 2024-02-01 NOTE — TELEPHONE ENCOUNTER
Regarding: Vascular bi-pass surgery  Contact: [phone]  ----- Message from Tammi Gomez sent at 2/1/2024 12:09 PM EST -----       ----- Message from Ashley Mitchell to Nehal Ceron MD sent at 2/1/2024 12:05 PM -----   My next appointment with Dr. Green is March 7. I think I’m due for a CT Scan at that time. I will keep you informed. Thanks.      ----- Message -----       From:Nehal Ceron MD       Sent:2/1/2024 11:59 AM EST         To:Polly Mitchell    Subject:Vascular bi-pass surgery    [facility]--you never seem to get a break.  When is your next follow-up with Dr. Green?      ----- Message -----       From:Polly Mitchell       Sent:2/1/2024 11:34 AM EST         To:Patient Medical Advice Request Message List    Subject:Vascular bi-pass surgery    Hi Dr. Ceron.    Well the never ending vein bi-pass is on hold again. Dr. Parrish gave me 3 months to see if my cough can be cleared up or if there is another solution to my vein problem.  He did an ultrasound and said it doesn't sound too bad. He wants to do another in 3 months to see if it sounds the same and didn't get any worse.  I hope it's the same! We did have the surgery scheduled for January 19, and I got as far as in to the room where they are prepping you for surgery.  The doctor didn't like the sound of my cough and he also had an emergency patient being flown in from [place], so he didn't want me to wait around all day.  That led to the putting it off for 3 months.        ----- Message -----       From:Nehal Ceron MD       Sent:1/31/2024  7:08 PM EST         To:Polly Mitchell    Subject:Vascular bi-pass surgery    Hello,    How are you feeling?  I saw you were diagnosed w +COVID.      ----- Message -----       From:Polly Mitchell       Sent:1/19/2024 10:49 AM EST         To:Patient Medical Advice Request Message List    Subject:Vascular bi-pass surgery    Page 2:    Dr. Parrish said he wanted to discuss the case with   Norma and see what he says.  I think we should make an appointment with Dr. Parrish to go over everything.  I see they already rescheduled the surgery for February 9th.  I would like your opinion please.  Thank you.  This is the 5th time this surgery was cancelled.  Thank you for your help.  Ashley Mitchell      ----- Message -----       From:Polly Mitchell       Sent:1/19/2024 10:44 AM EST         To:Nehal Ceron    Subject:Vascular bi-pass surgery    Hi Dr. Ceron:  I wanted to fill you in on the latest chapter on my bi-pass surgery story.  We were all set for the surgery this morning.  We left here at 4:30 AM for our 5:30 arrival.  We checked in and when they called me I went to the pre-surgery section and had to strip and put on the gown, slippers and cap.  I was waiting there for a while and starting coughing a bit. The nurses said it didn't sound good.  They also said that Dr. Parrish was running late because there was an emergency patient he had to work on.  So he came in and stated that they were flying in a patient from [place] and it was an emergency and that he didn't think he could do the surgery today because the emergency one would take several hours and I shouldn't have to wait that long.  Also he didn't like the sound of the cough and he didn't think the anestheologist would want to go ahead with the surgery with me coughing. (continued on page 2).     See page 2.

## 2024-02-06 ENCOUNTER — NURSE TRIAGE (OUTPATIENT)
Age: 79
End: 2024-02-06

## 2024-02-06 ENCOUNTER — TELEPHONE (OUTPATIENT)
Age: 79
End: 2024-02-06

## 2024-02-06 NOTE — TELEPHONE ENCOUNTER
Regarding: left foot dark  ----- Message from Rosa Elena Juares sent at 2/6/2024  8:12 AM EST -----  The patient called she fell  she tripped up the steps two days ago  --the left foot had a lump it went away and now it is turning dark  the patient has never seen anything like it   the patient sees Dr Ceron and wanted an appointment asap   she did not want to see anyone else   not sure if she should wait   please call thank you

## 2024-02-06 NOTE — TELEPHONE ENCOUNTER
"Reason for Disposition  • Injury and pain has not improved after 3 days    Answer Assessment - Initial Assessment Questions  1. MECHANISM: \"How did the injury happen?\" (e.g., twisting injury, direct blow)       Tripped up the steps  2. ONSET: \"When did the injury happen?\" (Minutes or hours ago)       Two days ago  3. LOCATION: \"Where is the injury located?\"       Left foot  4. APPEARANCE of INJURY: \"What does the injury look like?\"       Bruising   5. WEIGHT-BEARING: \"Can you put weight on that foot?\" \"Can you walk (four steps or more)?\"        yes  6. SIZE: For cuts, bruises, or swelling, ask: \"How large is it?\" (e.g., inches or centimeters;  entire joint)       Egg size  7. PAIN: \"Is there pain?\" If Yes, ask: \"How bad is the pain?\"    (e.g., Scale 1-10; or mild, moderate, severe)    - NONE (0): no pain.    - MILD (1-3): doesn't interfere with normal activities.     - MODERATE (4-7): interferes with normal activities (e.g., work or school) or awakens from sleep, limping.     - SEVERE (8-10): excruciating pain, unable to do any normal activities, unable to walk.       mild  8. TETANUS: For any breaks in the skin, ask: \"When was the last tetanus booster?\"      N/a  9. OTHER SYMPTOMS: \"Do you have any other symptoms?\"       Radiating up to the calf  10. PREGNANCY: \"Is there any chance you are pregnant?\" \"When was your last menstrual period?\"        N/a    Protocols used: Ankle and Foot Injury-ADULT-[place]    "

## 2024-02-06 NOTE — TELEPHONE ENCOUNTER
Please follow up with patient to ensure she either called her vascular doctor, went to the Er and or an appointment with PCP tomorrow at patient request.   Patient tripped up the step 2 days ago, banged her foot an twisted her leg. C/o very mild foot pain and calf pain. The large lump( Hematoma ) has dissipated however has bruising  spreading down to the toes. Patient is on blood thinners, Plavix however had stopped it for awhile due to covid and being on Paxlovid. Restarted the Plavix yesterday. Sees vascular . Patient declining appt today with other PCP in the office .wishes to see Dr Ceron tomorrow. I advised she call her vascular doctor to report the calf pain and follow their advisement, or she should proceed to the Emergency room  to have her calf assessed to rule out a blood clot.

## 2024-02-07 NOTE — TELEPHONE ENCOUNTER
Called patient. She advised that she did not see vascular yesterday, just had ultrasound . Vascular office called her just now and said report was normal. Patient says leg pain is better about 5 out of 10- hurts mostly after sitting with leg down. Bruising is about 20% better.    Called [facility]  and spoke with Lesli , she will fax ultrasound report.

## 2024-02-07 NOTE — TELEPHONE ENCOUNTER
Please call to see how pt is doing and see if ultrasound report read---pt had done in [place] on 2/6/24--ordered by vascular specialist.

## 2024-02-08 ENCOUNTER — TELEPHONE (OUTPATIENT)
Dept: FAMILY MEDICINE CLINIC | Facility: CLINIC | Age: 79
End: 2024-02-08

## 2024-02-08 NOTE — TELEPHONE ENCOUNTER
Regarding: FW: Fall on front porch  Contact: [phone]  Recommend urgent care or emergency room if patient had a fall and is still having this much pain, will need x-ray imaging.  ----- Message -----  From: Carmen Dowling  Sent: 2/8/2024   4:12 PM EST  To: Julio Cesar Petty DO  Subject: FW: Fall on front porch                            ----- Message -----  From: Katie Flynn  Sent: 2/8/2024  12:01 PM EST  To: [facility]  Subject: Fall on front porch                              ----- Message from Katie Flynn sent at 2/8/2024 12:01 PM EST -----       ----- Message from Ashley Mitchell to Nehal Ceron MD sent at 2/7/2024  9:37 PM -----   I fell on the front porch.  This is a continued note.  The nurse from your office seemed worried that no one from [facility]'s office even looked at my foot or leg and no one even spoke to us. Since it's not a blood clot, which is great.  But I need something for the pain in my calf.  I looked up Voltaren and you cannot use it when taking Plavix.  So can you please tell me what to buy to help with the really bad pain in my calf when I walk?  Thank you.  Ashley Mtichell

## 2024-02-08 NOTE — TELEPHONE ENCOUNTER
Spoke with pt re: leg pain advised tylenol and elevate leg spoke with Dr Reina's office US negative shows no clots. Pt has apt with you tomorrow for cold s/s. (See my chart message).

## 2024-02-09 ENCOUNTER — OFFICE VISIT (OUTPATIENT)
Dept: FAMILY MEDICINE CLINIC | Facility: CLINIC | Age: 79
End: 2024-02-09
Payer: MEDICARE

## 2024-02-09 ENCOUNTER — APPOINTMENT (OUTPATIENT)
Dept: RADIOLOGY | Facility: CLINIC | Age: 79
End: 2024-02-09
Payer: MEDICARE

## 2024-02-09 VITALS
DIASTOLIC BLOOD PRESSURE: 60 MMHG | RESPIRATION RATE: 16 BRPM | OXYGEN SATURATION: 94 % | HEART RATE: 94 BPM | TEMPERATURE: 98.9 F | BODY MASS INDEX: 31.65 KG/M2 | HEIGHT: 64 IN | SYSTOLIC BLOOD PRESSURE: 124 MMHG | WEIGHT: 185.4 LBS

## 2024-02-09 DIAGNOSIS — I73.9 PAD (PERIPHERAL ARTERY DISEASE) (HCC): ICD-10-CM

## 2024-02-09 DIAGNOSIS — R09.89 CHEST CONGESTION: Primary | ICD-10-CM

## 2024-02-09 DIAGNOSIS — R73.03 PREDIABETES: ICD-10-CM

## 2024-02-09 DIAGNOSIS — R09.81 SINUS CONGESTION: ICD-10-CM

## 2024-02-09 DIAGNOSIS — M79.672 LEFT FOOT PAIN: ICD-10-CM

## 2024-02-09 DIAGNOSIS — Z91.81 HISTORY OF FALL: ICD-10-CM

## 2024-02-09 DIAGNOSIS — R30.0 DYSURIA: ICD-10-CM

## 2024-02-09 PROBLEM — I70.221 ATHEROSCLEROSIS OF NATIVE ARTERIES OF EXTREMITIES WITH REST PAIN, RIGHT LEG (HCC): Status: ACTIVE | Noted: 2024-02-09

## 2024-02-09 LAB
SL AMB  POCT GLUCOSE, UA: ABNORMAL
SL AMB LEUKOCYTE ESTERASE,UA: 75
SL AMB POCT BILIRUBIN,UA: ABNORMAL
SL AMB POCT BLOOD,UA: ABNORMAL
SL AMB POCT CLARITY,UA: CLEAR
SL AMB POCT COLOR,UA: YELLOW
SL AMB POCT HEMOGLOBIN AIC: 5.8 (ref ?–6.5)
SL AMB POCT KETONES,UA: 15
SL AMB POCT NITRITE,UA: ABNORMAL
SL AMB POCT PH,UA: 5
SL AMB POCT SPECIFIC GRAVITY,UA: 1.02
SL AMB POCT URINE PROTEIN: ABNORMAL
SL AMB POCT UROBILINOGEN: ABNORMAL

## 2024-02-09 PROCEDURE — 73630 X-RAY EXAM OF FOOT: CPT

## 2024-02-09 PROCEDURE — 99214 OFFICE O/P EST MOD 30 MIN: CPT | Performed by: FAMILY MEDICINE

## 2024-02-09 PROCEDURE — 83036 HEMOGLOBIN GLYCOSYLATED A1C: CPT | Performed by: FAMILY MEDICINE

## 2024-02-09 PROCEDURE — 81003 URINALYSIS AUTO W/O SCOPE: CPT | Performed by: FAMILY MEDICINE

## 2024-02-09 RX ORDER — DOXYCYCLINE HYCLATE 100 MG
100 TABLET ORAL 2 TIMES DAILY
Qty: 20 TABLET | Refills: 0 | Status: SHIPPED | OUTPATIENT
Start: 2024-02-09 | End: 2024-02-19

## 2024-02-09 RX ORDER — OXYMETAZOLINE HYDROCHLORIDE 0.05 G/100ML
SPRAY NASAL
COMMUNITY
Start: 2024-02-05

## 2024-02-09 RX ORDER — CLOPIDOGREL BISULFATE 75 MG
TABLET ORAL
COMMUNITY
Start: 2023-12-11

## 2024-02-09 RX ORDER — PREDNISONE 20 MG/1
40 TABLET ORAL DAILY
Qty: 10 TABLET | Refills: 0 | Status: SHIPPED | OUTPATIENT
Start: 2024-02-09 | End: 2024-02-14

## 2024-02-09 NOTE — PROGRESS NOTES
Assessment/Plan:    1. Chest congestion  -     doxycycline hyclate (VIBRA-TABS) 100 mg tablet; Take 1 tablet (100 mg total) by mouth 2 (two) times a day for 10 days  -     predniSONE 20 mg tablet; Take 2 tablets (40 mg total) by mouth daily for 5 days Take with food    2. Sinus congestion  -     doxycycline hyclate (VIBRA-TABS) 100 mg tablet; Take 1 tablet (100 mg total) by mouth 2 (two) times a day for 10 days  -     predniSONE 20 mg tablet; Take 2 tablets (40 mg total) by mouth daily for 5 days Take with food    3. Prediabetes  Assessment & Plan:  GuG6n=3.8%    Orders:  -     Microalbumin,Urine; Future  -     POCT hemoglobin A1c  -     POCT urine dip auto non-scope  -     Urine culture    4. Dysuria  -     Urine culture  -     doxycycline hyclate (VIBRA-TABS) 100 mg tablet; Take 1 tablet (100 mg total) by mouth 2 (two) times a day for 10 days    5. PAD (peripheral artery disease) ([facility])  Assessment & Plan:  Follows w vascular  Recent postponement of procedure right leg  (see specialist chart notes)      6. Left foot pain  Assessment & Plan:  Otc analgesics prn    Orders:  -     XR foot 3+ vw left; Future; Expected date: 02/09/2024    7. History of fall  Assessment & Plan:  Mechanical--not syncopal          Falls Plan of Care: balance, strength, and gait training instructions were provided.          Subjective:      Patient ID: Polly Mitchell is a 78 y.o. female.    Chief Complaint   Patient presents with   • Cough     Congestion, patient had covid 2 weeks ago      • Leg Pain     Patient fell last week and her foot and lower leg hurts       Cough  This is a chronic problem. The cough is Productive of sputum. Associated symptoms include a fever, nasal congestion, postnasal drip, rhinorrhea, shortness of breath and wheezing. Pertinent negatives include no hemoptysis or rash. Associated symptoms comments: +COVID 2 weeks ago--took Paxlovid  Asthma/copd --follows w pulm--Dr. Green. She has tried OTC cough  suppressant, oral steroids and a beta-agonist inhaler for the symptoms. Her past medical history is significant for asthma, bronchitis, COPD and environmental allergies.   Leg Pain   The injury mechanism was a fall. The pain is present in the left leg. The quality of the pain is described as shooting and stabbing. Associated symptoms include tingling. Pertinent negatives include no inability to bear weight, loss of motion or loss of sensation. She reports no foreign bodies present. The symptoms are aggravated by movement.   Fell on porch  Had doppler study ordered by vascular--negative    Def1r=5.8%    The following portions of the patient's history were reviewed and updated as appropriate: allergies, current medications, past family history, past medical history, past social history, past surgical history and problem list.    Review of Systems   Constitutional:  Positive for fever.   HENT:  Positive for postnasal drip and rhinorrhea.    Respiratory:  Positive for cough, shortness of breath and wheezing. Negative for hemoptysis.    Skin:  Negative for rash.   Allergic/Immunologic: Positive for environmental allergies.   Neurological:  Positive for tingling.         Current Outpatient Medications   Medication Sig Dispense Refill   • albuterol (ProAir HFA) 90 mcg/act inhaler Inhale 2 puffs every 4 (four) hours as needed for wheezing 18 g 3   • ALPRAZolam (XANAX) 0.25 mg tablet Take 1 tablet (0.25 mg total) by mouth 2 (two) times a day as needed for anxiety 30 tablet 0   • amLODIPine (NORVASC) 5 mg tablet TAKE 1 TABLET BY MOUTH TWICE  DAILY 180 tablet 1   • b complex vitamins capsule Take 1 capsule by mouth       • betamethasone dipropionate (DIPROSONE) 0.05 % cream Apply topically 2 (two) times a day 30 g 5   • Biotin 5000 MCG CAPS Take 2 capsules by mouth daily       • CALCIUM PO Take 600 mg by mouth       • cetirizine (ZyrTEC) 10 mg tablet      • Cholecalciferol (VITAMIN D3 PO) 1 capsule every 24 hours     •  "clotrimazole-betamethasone (LOTRISONE) 1-0.05 % cream Apply topically 2 (two) times a day 45 g 1   • doxycycline hyclate (VIBRA-TABS) 100 mg tablet Take 1 tablet (100 mg total) by mouth 2 (two) times a day for 10 days 20 tablet 0   • erythromycin (ILOTYCIN) ophthalmic ointment APPLY OINTMENT INTO EACH EYE AT BEDTIME     • ipratropium-albuterol (DUO-NEB) 0.5-2.5 mg/3 mL nebulizer solution USE 1 AMPULE IN NEBULIZER EVERY 6 HOURS AS NEEDED FOR WHEEZING FOR SHORTNESS OF BREATH 270 mL 1   • losartan (COZAAR) 100 MG tablet Take 100 mg by mouth daily      • Magnesium 400 MG TABS Take by mouth     • montelukast (SINGULAIR) 10 mg tablet TAKE 1 TABLET BY MOUTH DAILY AT  BEDTIME 90 tablet 1   • oxymetazoline (Afrin 12 Hour) 0.05 % nasal spray      • Plavix 75 MG tablet      • rosuvastatin (CRESTOR) 10 MG tablet      • Trelegy Ellipta 200-62.5-25 MCG/INH AEPB inhaler      • triamcinolone (KENALOG) 0.1 % cream APPLY CREAM EXTERNALLY TO AFFECTED AREA TWICE DAILY AS NEEDED     • VITAMIN B1-B12 PO Take by mouth       No current facility-administered medications for this visit.       Objective:    /60 (BP Location: Left arm, Patient Position: Sitting, Cuff Size: Standard)   Pulse 94   Temp 98.9 °F (37.2 °C)   Resp 16   Ht 5' 4\" (1.626 m)   Wt 84.1 kg (185 lb 6.4 oz)   SpO2 94%   BMI 31.82 kg/m²        Physical Exam  Vitals and nursing note reviewed.   Constitutional:       General: She is not in acute distress.  HENT:      Nose: Congestion present.      Mouth/Throat:      Pharynx: No oropharyngeal exudate.   Eyes:      General: No scleral icterus.  Cardiovascular:      Rate and Rhythm: Normal rate and regular rhythm.      Comments: +varicose veins-no palpable cord/no calf erythema  Pulmonary:      Effort: Pulmonary effort is normal. No respiratory distress.      Comments: Few, scattered coarse rhonchi, no localization  Abdominal:      General: Bowel sounds are normal.      Palpations: Abdomen is soft.      Tenderness: " There is no abdominal tenderness. There is no right CVA tenderness or left CVA tenderness.   Musculoskeletal:         General: Tenderness and signs of injury (left foot/calf) present.      Cervical back: Neck supple.   Skin:     General: Skin is warm and dry.      Coloration: Skin is not jaundiced.      Findings: No erythema or rash.   Neurological:      General: No focal deficit present.      Mental Status: She is alert and oriented to person, place, and time.      Cranial Nerves: No cranial nerve deficit.   Psychiatric:         Mood and Affect: Mood normal.         Nehal Ceron MD

## 2024-02-11 LAB
BACTERIA UR CULT: NORMAL
Lab: NORMAL
MICROALBUMIN UR-MCNC: 10.8 UG/ML

## 2024-02-15 PROBLEM — I70.221 ATHEROSCLEROSIS OF NATIVE ARTERIES OF EXTREMITIES WITH REST PAIN, RIGHT LEG (HCC): Status: RESOLVED | Noted: 2024-02-09 | Resolved: 2024-02-15

## 2024-02-19 ENCOUNTER — TELEPHONE (OUTPATIENT)
Dept: FAMILY MEDICINE CLINIC | Facility: CLINIC | Age: 79
End: 2024-02-19

## 2024-02-19 DIAGNOSIS — B37.2 YEAST DERMATITIS: ICD-10-CM

## 2024-02-19 RX ORDER — CLOTRIMAZOLE AND BETAMETHASONE DIPROPIONATE 10; .64 MG/G; MG/G
CREAM TOPICAL 2 TIMES DAILY
Qty: 45 G | Refills: 1 | Status: SHIPPED | OUTPATIENT
Start: 2024-02-19

## 2024-02-19 NOTE — TELEPHONE ENCOUNTER
Regarding: UTI  Contact: [phone]  ----- Message from Anali Falk sent at 2/19/2024  2:10 PM EST -----       ----- Message from Ashley Mitchell to Nehal Ceron MD sent at 2/19/2024  1:13 PM -----   Hi Dr. Ceron.  I just finished my Doxycycline last night.  I noticed the last couple days that I had pain in my vagina when I went to the bathroom.  It wasn't itchy, but burned like it was raw.  Last night there was blood on the pad that I use for incontinence.  It wasn't from my urethra, but from the surrounding area where it feels uncomfortable and burns when I go to the bathroom.  Is that possibly from the doxycycline?  Is there anything I can buy to rub on it, like a cream.  I really don't want wany more pills. Thank you.  Ashley Mitchell

## 2024-02-21 PROBLEM — Z11.59 NEED FOR HEPATITIS C SCREENING TEST: Status: RESOLVED | Noted: 2020-06-30 | Resolved: 2024-02-21

## 2024-02-21 PROBLEM — R05.9 COUGH: Status: RESOLVED | Noted: 2020-06-30 | Resolved: 2024-02-21

## 2024-05-08 ENCOUNTER — OFFICE VISIT (OUTPATIENT)
Dept: FAMILY MEDICINE CLINIC | Facility: CLINIC | Age: 79
End: 2024-05-08
Payer: MEDICARE

## 2024-05-08 VITALS
RESPIRATION RATE: 16 BRPM | OXYGEN SATURATION: 94 % | TEMPERATURE: 98.3 F | DIASTOLIC BLOOD PRESSURE: 60 MMHG | SYSTOLIC BLOOD PRESSURE: 130 MMHG | HEIGHT: 64 IN | WEIGHT: 189 LBS | HEART RATE: 89 BPM | BODY MASS INDEX: 32.27 KG/M2

## 2024-05-08 DIAGNOSIS — R26.89 BALANCE PROBLEM: Primary | ICD-10-CM

## 2024-05-08 DIAGNOSIS — J44.89 ASTHMA-COPD OVERLAP SYNDROME: ICD-10-CM

## 2024-05-08 DIAGNOSIS — Z78.0 POSTMENOPAUSAL: ICD-10-CM

## 2024-05-08 DIAGNOSIS — E78.2 DM TYPE 2 WITH DIABETIC MIXED HYPERLIPIDEMIA  (HCC): ICD-10-CM

## 2024-05-08 DIAGNOSIS — G62.9 NEUROPATHY: ICD-10-CM

## 2024-05-08 DIAGNOSIS — F41.9 ANXIETY: ICD-10-CM

## 2024-05-08 DIAGNOSIS — E11.69 DM TYPE 2 WITH DIABETIC MIXED HYPERLIPIDEMIA  (HCC): ICD-10-CM

## 2024-05-08 DIAGNOSIS — Z12.31 ENCOUNTER FOR SCREENING MAMMOGRAM FOR MALIGNANT NEOPLASM OF BREAST: Chronic | ICD-10-CM

## 2024-05-08 DIAGNOSIS — M79.10 MYALGIA: ICD-10-CM

## 2024-05-08 DIAGNOSIS — E78.2 MIXED HYPERLIPIDEMIA: ICD-10-CM

## 2024-05-08 DIAGNOSIS — I10 HYPERTENSION, UNSPECIFIED TYPE: ICD-10-CM

## 2024-05-08 PROCEDURE — 99214 OFFICE O/P EST MOD 30 MIN: CPT | Performed by: FAMILY MEDICINE

## 2024-05-08 PROCEDURE — G2211 COMPLEX E/M VISIT ADD ON: HCPCS | Performed by: FAMILY MEDICINE

## 2024-05-08 RX ORDER — BUDESONIDE 0.5 MG/2ML
INHALANT ORAL
COMMUNITY

## 2024-05-08 RX ORDER — GABAPENTIN 300 MG/1
300 CAPSULE ORAL
Qty: 90 CAPSULE | Refills: 0 | Status: SHIPPED | OUTPATIENT
Start: 2024-05-08 | End: 2024-05-08 | Stop reason: SDUPTHER

## 2024-05-08 RX ORDER — ALBUTEROL SULFATE 90 UG/1
2 AEROSOL, METERED RESPIRATORY (INHALATION) EVERY 4 HOURS PRN
Qty: 18 G | Refills: 3 | Status: SHIPPED | OUTPATIENT
Start: 2024-05-08 | End: 2024-05-19 | Stop reason: SDUPTHER

## 2024-05-08 RX ORDER — GABAPENTIN 300 MG/1
300 CAPSULE ORAL
Qty: 90 CAPSULE | Refills: 0 | Status: SHIPPED | OUTPATIENT
Start: 2024-05-08 | End: 2024-05-19 | Stop reason: DRUGHIGH

## 2024-05-08 RX ORDER — ALPRAZOLAM 0.25 MG/1
0.25 TABLET ORAL 2 TIMES DAILY PRN
Qty: 30 TABLET | Refills: 0 | Status: SHIPPED | OUTPATIENT
Start: 2024-05-08 | End: 2024-05-08 | Stop reason: SDUPTHER

## 2024-05-08 RX ORDER — ALPRAZOLAM 0.25 MG/1
0.25 TABLET ORAL 2 TIMES DAILY PRN
Qty: 30 TABLET | Refills: 0 | Status: SHIPPED | OUTPATIENT
Start: 2024-05-08 | End: 2024-05-19 | Stop reason: SDUPTHER

## 2024-05-08 RX ORDER — SODIUM CHLORIDE FOR INHALATION 3 %
4 VIAL, NEBULIZER (ML) INHALATION AS NEEDED
COMMUNITY

## 2024-05-08 NOTE — LETTER
May 29, 2024     Ashley IRENEYaneth Stephen  [address] [facility] [address]    Patient: Polly Mitchell   YOB: 1945   Date of Visit: 5/8/2024       Dear Dr. Mitchell:    Thank you for referring Polly Mitchell to me for evaluation. Below are the relevant portions of my assessment and plan of care.         If you have questions, please do not hesitate to call me. I look forward to following Polly along with you.         Sincerely,        Nehal Ceron MD        CC: No Recipients

## 2024-05-09 LAB
ALBUMIN/CREAT UR: 6 MG/G CREAT (ref 0–29)
CREAT UR-MCNC: 173.6 MG/DL
MICROALBUMIN UR-MCNC: 9.8 UG/ML

## 2024-05-10 LAB
ALBUMIN/CREAT UR: <7 MG/G CREAT (ref 0–29)
BUN SERPL-MCNC: 13 MG/DL (ref 8–27)
BUN/CREAT SERPL: 20 (ref 12–28)
CALCIUM SERPL-MCNC: 9.3 MG/DL (ref 8.7–10.3)
CHLORIDE SERPL-SCNC: 101 MMOL/L (ref 96–106)
CK SERPL-CCNC: 69 U/L (ref 32–182)
CO2 SERPL-SCNC: 23 MMOL/L (ref 20–29)
CREAT SERPL-MCNC: 0.64 MG/DL (ref 0.57–1)
CREAT UR-MCNC: 40.3 MG/DL
EGFR: 91 ML/MIN/1.73
GLUCOSE SERPL-MCNC: 96 MG/DL (ref 70–99)
MAGNESIUM SERPL-MCNC: 2.1 MG/DL (ref 1.6–2.3)
MICROALBUMIN UR-MCNC: <3 UG/ML
POTASSIUM SERPL-SCNC: 4.6 MMOL/L (ref 3.5–5.2)
SODIUM SERPL-SCNC: 140 MMOL/L (ref 134–144)
TSH SERPL DL<=0.005 MIU/L-ACNC: 2.12 UIU/ML (ref 0.45–4.5)

## 2024-05-13 ENCOUNTER — TELEPHONE (OUTPATIENT)
Age: 79
End: 2024-05-13

## 2024-05-13 NOTE — TELEPHONE ENCOUNTER
Patient called in asking for a call back to discuss lab results from 5/9 once Dr. Ceron has a chance to review them. Please advise.

## 2024-05-14 NOTE — TELEPHONE ENCOUNTER
Called patient, she said its only been a couple days since she has been of statin, no difference noted as yet.

## 2024-05-14 NOTE — TELEPHONE ENCOUNTER
Dr. Ceron:    Patient advised of labwork results.  She   wanted to know if she should go back to 5mg of rosuvastatin?  Please advise.

## 2024-05-15 DIAGNOSIS — E78.2 MIXED HYPERLIPIDEMIA: Primary | ICD-10-CM

## 2024-05-15 RX ORDER — ROSUVASTATIN CALCIUM 5 MG/1
5 TABLET, COATED ORAL DAILY
Qty: 90 TABLET | Refills: 3 | Status: SHIPPED | OUTPATIENT
Start: 2024-05-15

## 2024-05-15 NOTE — TELEPHONE ENCOUNTER
Patient called and is wanting to ask Dr Ceron if she could please send a 3 month supply of her rosuvastatin 5 mg through [facility]

## 2024-05-16 ENCOUNTER — TELEPHONE (OUTPATIENT)
Age: 79
End: 2024-05-16

## 2024-05-16 DIAGNOSIS — F41.9 ANXIETY: ICD-10-CM

## 2024-05-16 NOTE — TELEPHONE ENCOUNTER
Pt medication Gabapentin would need to be sent to [facility] and not [facility], pt would have to pay out of pocket if sent to [facility]. Pt would also like to know if medication can be brought down to 100mg instead of 300mg

## 2024-05-16 NOTE — TELEPHONE ENCOUNTER
Patient requests that any medications that can be sent as a 90 supply should go to mail order. Only use [facility] for same day medications.

## 2024-05-17 RX ORDER — ALPRAZOLAM 0.25 MG/1
0.25 TABLET ORAL 2 TIMES DAILY PRN
Qty: 30 TABLET | Refills: 0 | OUTPATIENT
Start: 2024-05-17

## 2024-05-17 NOTE — TELEPHONE ENCOUNTER
Patient advised. She is also requesting alprazolam, gabapentin and albuterol that were sent on 5/8 to [facility], cancelled and sent to [facility] instead as it is cheaper that way.

## 2024-05-19 DIAGNOSIS — F41.9 ANXIETY: ICD-10-CM

## 2024-05-19 DIAGNOSIS — J44.89 ASTHMA-COPD OVERLAP SYNDROME: ICD-10-CM

## 2024-05-19 DIAGNOSIS — G62.9 NEUROPATHY: Primary | ICD-10-CM

## 2024-05-19 RX ORDER — ALBUTEROL SULFATE 90 UG/1
2 AEROSOL, METERED RESPIRATORY (INHALATION) EVERY 4 HOURS PRN
Qty: 54 G | Refills: 3 | Status: SHIPPED | OUTPATIENT
Start: 2024-05-19

## 2024-05-19 RX ORDER — GABAPENTIN 100 MG/1
100 CAPSULE ORAL
Qty: 90 CAPSULE | Refills: 3 | Status: SHIPPED | OUTPATIENT
Start: 2024-05-19

## 2024-05-19 RX ORDER — ALPRAZOLAM 0.25 MG/1
0.25 TABLET ORAL 2 TIMES DAILY PRN
Qty: 90 TABLET | Refills: 0 | Status: SHIPPED | OUTPATIENT
Start: 2024-05-19

## 2024-05-26 NOTE — PROGRESS NOTES
Assessment/Plan:    1. Balance problem  -     CK; Future  -     TSH, 3rd generation; Future  -     Basic metabolic panel; Future  -     Magnesium; Future  -     CK  -     TSH, 3rd generation  -     Basic metabolic panel  -     Magnesium  -     Ambulatory referral to Physical Therapy; Future  2. Encounter for screening mammogram for malignant neoplasm of breast  -     Mammo screening bilateral w 3d & cad; Future  3. Postmenopausal  -     DXA bone density spine hip and pelvis; Future; Expected date: 05/08/2024  4. DM type 2 with diabetic mixed hyperlipidemia  (HCC)  -     Albumin / creatinine urine ratio; Future  -     Albumin / creatinine urine ratio  5. Myalgia  -     CK; Future  -     TSH, 3rd generation; Future  -     Basic metabolic panel; Future  -     Magnesium; Future  -     CK  -     TSH, 3rd generation  -     Basic metabolic panel  -     Magnesium  6. Hypertension, unspecified type  -     Basic metabolic panel; Future  -     Basic metabolic panel  7. Mixed hyperlipidemia  -     TSH, 3rd generation; Future  -     TSH, 3rd generation  8. Asthma-COPD overlap syndrome  9. Anxiety  10. Neuropathy      Depression Screening and Follow-up Plan: Patient was screened for depression during today's encounter. They screened negative with a PHQ-2 score of 0.             Subjective:      Patient ID: Polly Mitchell is a 79 y.o. female.    Chief Complaint   Patient presents with   • Balance Problem     Legs feel heavy /stiff.when she walks a lot her ankles burn and feet hurt . She says she feels off balance       Leg Pain   There was no injury mechanism. The pain is present in the left leg and right leg. The pain has been Fluctuating since onset. Associated symptoms include muscle weakness and tingling. Pertinent negatives include no inability to bear weight or loss of motion. She reports no foreign bodies present. She has tried acetaminophen, elevation and NSAIDs for the symptoms.       The following portions of the  patient's history were reviewed and updated as appropriate: allergies, current medications, past family history, past medical history, past social history, past surgical history and problem list.    Review of Systems   Constitutional:  Positive for fatigue. Negative for fever.   Eyes:  Positive for visual disturbance.   Respiratory: Negative.     Cardiovascular: Negative.    Musculoskeletal:  Positive for arthralgias, gait problem and myalgias.   Skin:  Negative for rash.   Allergic/Immunologic: Positive for environmental allergies.   Neurological:  Positive for tingling and weakness.   Psychiatric/Behavioral:  Positive for sleep disturbance. The patient is nervous/anxious.          Current Outpatient Medications   Medication Sig Dispense Refill   • amLODIPine (NORVASC) 5 mg tablet TAKE 1 TABLET BY MOUTH TWICE  DAILY 180 tablet 1   • b complex vitamins capsule Take 1 capsule by mouth       • betamethasone dipropionate (DIPROSONE) 0.05 % cream Apply topically 2 (two) times a day 30 g 5   • Biotin 5000 MCG CAPS Take 2 capsules by mouth daily       • budesonide (PULMICORT) 0.5 mg/2 mL nebulizer solution USE 2 ML IN NEBULIZER TWICE DAILY     • CALCIUM PO Take 600 mg by mouth       • cetirizine (ZyrTEC) 10 mg tablet      • Cholecalciferol (VITAMIN D3 PO) 1 capsule every 24 hours     • clotrimazole-betamethasone (LOTRISONE) 1-0.05 % cream Apply topically 2 (two) times a day 45 g 1   • erythromycin (ILOTYCIN) ophthalmic ointment APPLY OINTMENT INTO EACH EYE AT BEDTIME     • ipratropium-albuterol (DUO-NEB) 0.5-2.5 mg/3 mL nebulizer solution USE 1 AMPULE IN NEBULIZER EVERY 6 HOURS AS NEEDED FOR WHEEZING FOR SHORTNESS OF BREATH 270 mL 1   • losartan (COZAAR) 100 MG tablet Take 100 mg by mouth daily      • Misc Natural Products (ELDERBERRY IMMUNE COMPLEX PO)      • montelukast (SINGULAIR) 10 mg tablet TAKE 1 TABLET BY MOUTH DAILY AT  BEDTIME 90 tablet 1   • Plavix 75 MG tablet      • sodium chloride 3 % inhalation solution  "Take 4 mL by nebulization as needed for cough     • Trelegy Ellipta 200-62.5-25 MCG/INH AEPB inhaler      • triamcinolone (KENALOG) 0.1 % cream APPLY CREAM EXTERNALLY TO AFFECTED AREA TWICE DAILY AS NEEDED     • VITAMIN B1-B12 PO Take by mouth     • albuterol (ProAir HFA) 90 mcg/act inhaler Inhale 2 puffs every 4 (four) hours as needed for wheezing 54 g 3   • ALPRAZolam (XANAX) 0.25 mg tablet Take 1 tablet (0.25 mg total) by mouth 2 (two) times a day as needed for anxiety 90 tablet 0   • gabapentin (NEURONTIN) 100 mg capsule Take 1 capsule (100 mg total) by mouth daily at bedtime 90 capsule 3   • rosuvastatin (CRESTOR) 5 mg tablet Take 1 tablet (5 mg total) by mouth daily 90 tablet 3     No current facility-administered medications for this visit.       Objective:    /60 (BP Location: Left arm, Patient Position: Sitting, Cuff Size: Standard)   Pulse 89   Temp 98.3 °F (36.8 °C)   Resp 16   Ht 5' 4\" (1.626 m)   Wt 85.7 kg (189 lb)   SpO2 94%   BMI 32.44 kg/m²        Physical Exam  Vitals and nursing note reviewed.   Constitutional:       General: She is not in acute distress.  Cardiovascular:      Rate and Rhythm: Normal rate and regular rhythm.      Comments: +varicose veins-no palpable cord/no calf erythema  Pulmonary:      Effort: Pulmonary effort is normal. No respiratory distress.      Comments: Few, scattered coarse rhonchi, no localization  Abdominal:      General: Bowel sounds are normal.      Palpations: Abdomen is soft.      Tenderness: There is no abdominal tenderness.   Musculoskeletal:         General: Tenderness present.      Cervical back: Neck supple.   Skin:     General: Skin is warm and dry.      Coloration: Skin is not jaundiced.      Findings: No erythema or rash.   Neurological:      General: No focal deficit present.      Mental Status: She is alert and oriented to person, place, and time.      Cranial Nerves: No cranial nerve deficit.   Psychiatric:         Mood and Affect: Mood " normal.         Nehal Ceron MD

## 2024-06-12 DIAGNOSIS — J45.909 UNCOMPLICATED ASTHMA, UNSPECIFIED ASTHMA SEVERITY, UNSPECIFIED WHETHER PERSISTENT: ICD-10-CM

## 2024-06-12 DIAGNOSIS — R06.2 WHEEZE: ICD-10-CM

## 2024-06-12 RX ORDER — IPRATROPIUM BROMIDE AND ALBUTEROL SULFATE 2.5; .5 MG/3ML; MG/3ML
SOLUTION RESPIRATORY (INHALATION)
Qty: 270 ML | Refills: 1 | Status: SHIPPED | OUTPATIENT
Start: 2024-06-12

## 2024-07-20 DIAGNOSIS — F41.9 ANXIETY: ICD-10-CM

## 2024-07-20 DIAGNOSIS — J45.909 MODERATE ASTHMA WITHOUT COMPLICATION, UNSPECIFIED WHETHER PERSISTENT: ICD-10-CM

## 2024-07-20 DIAGNOSIS — I10 ESSENTIAL HYPERTENSION: ICD-10-CM

## 2024-07-20 RX ORDER — AMLODIPINE BESYLATE 5 MG/1
TABLET ORAL
Qty: 200 TABLET | Refills: 1 | Status: SHIPPED | OUTPATIENT
Start: 2024-07-20

## 2024-07-20 RX ORDER — MONTELUKAST SODIUM 10 MG/1
TABLET ORAL
Qty: 100 TABLET | Refills: 1 | Status: SHIPPED | OUTPATIENT
Start: 2024-07-20

## 2024-07-23 ENCOUNTER — APPOINTMENT (OUTPATIENT)
Dept: RADIOLOGY | Facility: CLINIC | Age: 79
End: 2024-07-23
Payer: MEDICARE

## 2024-07-23 ENCOUNTER — OFFICE VISIT (OUTPATIENT)
Dept: FAMILY MEDICINE CLINIC | Facility: CLINIC | Age: 79
End: 2024-07-23
Payer: MEDICARE

## 2024-07-23 VITALS
OXYGEN SATURATION: 91 % | BODY MASS INDEX: 31.86 KG/M2 | RESPIRATION RATE: 18 BRPM | WEIGHT: 186.6 LBS | TEMPERATURE: 97.8 F | SYSTOLIC BLOOD PRESSURE: 130 MMHG | DIASTOLIC BLOOD PRESSURE: 62 MMHG | HEIGHT: 64 IN | HEART RATE: 85 BPM

## 2024-07-23 DIAGNOSIS — J44.89 ASTHMA-COPD OVERLAP SYNDROME: ICD-10-CM

## 2024-07-23 DIAGNOSIS — M25.561 RIGHT ANTERIOR KNEE PAIN: Primary | ICD-10-CM

## 2024-07-23 DIAGNOSIS — M25.561 RIGHT ANTERIOR KNEE PAIN: ICD-10-CM

## 2024-07-23 DIAGNOSIS — I10 PRIMARY HYPERTENSION: ICD-10-CM

## 2024-07-23 PROCEDURE — 73562 X-RAY EXAM OF KNEE 3: CPT

## 2024-07-23 PROCEDURE — G2211 COMPLEX E/M VISIT ADD ON: HCPCS | Performed by: STUDENT IN AN ORGANIZED HEALTH CARE EDUCATION/TRAINING PROGRAM

## 2024-07-23 PROCEDURE — 99214 OFFICE O/P EST MOD 30 MIN: CPT | Performed by: STUDENT IN AN ORGANIZED HEALTH CARE EDUCATION/TRAINING PROGRAM

## 2024-07-23 RX ORDER — ALPRAZOLAM 0.25 MG/1
0.25 TABLET ORAL 2 TIMES DAILY PRN
Qty: 90 TABLET | Refills: 0 | Status: SHIPPED | OUTPATIENT
Start: 2024-07-23

## 2024-07-23 NOTE — PROGRESS NOTES
Clinic Visit Note  Polly Mitchell 79 y.o. female   MRN: [id number]    Assessment and Plan      Diagnoses and all orders for this visit:    Right anterior knee pain  Continue OTC Voltaren gel up to 4 times daily, stretching/strengthening exercises, range of motion intact, MSK etiology, x-ray imaging to rule out acute fracture or abnormality.  Patient walking without pain.  -     XR knee 3 vw right non injury; Future    Asthma-COPD overlap syndrome  Continue inhaler therapies, close follow-up with pulmonology    Primary hypertension  Blood pressure and vital stable, continue antihypertensive regimen    My impressions and treatment recommendations were discussed in detail with the patient who verbalized understanding and had no further questions.  Discharge instructions were provided.    Subjective     Chief Complaint: F/U    History of Present Illness:    Patient is a pleasant 79-year-old female coming in secondary to right knee pain after mild fall, no head strike or loss of consciousness.    The following portions of the patient's history were reviewed and updated as appropriate: allergies, current medications, past family history, past medical history, past social history, past surgical history and problem list.    REVIEW OF SYSTEMS:  A complete 12-point review of systems is negative other than that noted in the HPI.    Review of Systems   Constitutional:  Negative for chills and fever.   HENT:  Negative for ear pain and sore throat.    Eyes:  Negative for pain and visual disturbance.   Respiratory:  Negative for cough and shortness of breath.    Cardiovascular:  Negative for chest pain and palpitations.   Gastrointestinal:  Negative for abdominal pain and vomiting.   Genitourinary:  Negative for dysuria and hematuria.   Musculoskeletal:  Positive for back pain and gait problem. Negative for arthralgias.   Skin:  Negative for color change and rash.   Neurological:  Negative for seizures and syncope.   All other  systems reviewed and are negative.        Current Outpatient Medications:   •  albuterol (ProAir HFA) 90 mcg/act inhaler, Inhale 2 puffs every 4 (four) hours as needed for wheezing, Disp: 54 g, Rfl: 3  •  amLODIPine (NORVASC) 5 mg tablet, TAKE 1 TABLET BY MOUTH TWICE  DAILY, Disp: 200 tablet, Rfl: 1  •  b complex vitamins capsule, Take 1 capsule by mouth  , Disp: , Rfl:   •  betamethasone dipropionate (DIPROSONE) 0.05 % cream, Apply topically 2 (two) times a day, Disp: 30 g, Rfl: 5  •  Biotin 5000 MCG CAPS, Take 2 capsules by mouth daily  , Disp: , Rfl:   •  budesonide (PULMICORT) 0.5 mg/2 mL nebulizer solution, USE 2 ML IN NEBULIZER TWICE DAILY, Disp: , Rfl:   •  CALCIUM PO, Take 600 mg by mouth  , Disp: , Rfl:   •  cetirizine (ZyrTEC) 10 mg tablet, , Disp: , Rfl:   •  Cholecalciferol (VITAMIN D3 PO), 1 capsule every 24 hours, Disp: , Rfl:   •  clotrimazole-betamethasone (LOTRISONE) 1-0.05 % cream, Apply topically 2 (two) times a day, Disp: 45 g, Rfl: 1  •  gabapentin (NEURONTIN) 100 mg capsule, Take 1 capsule (100 mg total) by mouth daily at bedtime, Disp: 90 capsule, Rfl: 3  •  ipratropium-albuterol (DUO-NEB) 0.5-2.5 mg/3 mL nebulizer solution, USE 1 AMPULE IN NEBULIZER EVERY 6 HOURS AS NEEDED FOR WHEEZING FOR SHORTNESS OF BREATH (Patient taking differently: Take 2 mL by nebulization 2 (two) times a day), Disp: 270 mL, Rfl: 1  •  losartan (COZAAR) 100 MG tablet, Take 100 mg by mouth daily , Disp: , Rfl:   •  Misc Natural Products (ELDERBERRY IMMUNE COMPLEX PO), , Disp: , Rfl:   •  montelukast (SINGULAIR) 10 mg tablet, TAKE 1 TABLET BY MOUTH DAILY AT  BEDTIME, Disp: 100 tablet, Rfl: 1  •  Plavix 75 MG tablet, , Disp: , Rfl:   •  rosuvastatin (CRESTOR) 5 mg tablet, Take 1 tablet (5 mg total) by mouth daily, Disp: 90 tablet, Rfl: 3  •  sodium chloride 3 % inhalation solution, Take 4 mL by nebulization as needed for cough sometimes, Disp: , Rfl:   •  Trelegy Ellipta 200-62.5-25 MCG/INH AEPB inhaler, , Disp: , Rfl:    •  VITAMIN B1-B12 PO, Take by mouth, Disp: , Rfl:   •  ALPRAZolam (XANAX) 0.25 mg tablet, TAKE 1 TABLET BY MOUTH TWICE  DAILY AS NEEDED FOR ANXIETY (Patient not taking: Reported on 7/23/2024), Disp: 90 tablet, Rfl: 0  •  DIMETHICONE, TOPICAL, 3 % LOTN, Apply topically (Patient not taking: Reported on 7/23/2024), Disp: , Rfl:   •  erythromycin (ILOTYCIN) ophthalmic ointment, APPLY OINTMENT INTO EACH EYE AT BEDTIME (Patient not taking: Reported on 7/23/2024), Disp: , Rfl:   •  triamcinolone (KENALOG) 0.1 % cream, APPLY CREAM EXTERNALLY TO AFFECTED AREA TWICE DAILY AS NEEDED (Patient not taking: Reported on 7/23/2024), Disp: , Rfl:   Past Medical History:   Diagnosis Date   • Allergic rhinitis 08/25/2008   • Arthritis     LAST ASSESSED: [id number]   • Asthma    • Asthma with acute exacerbation 06/24/2009   • Asthma-COPD overlap syndrome    • Bilateral renal cysts     RESOLVED: 03OCT2014   • COPD (chronic obstructive pulmonary disease) (HCC)    • Eczema     LAST ASSESSED: 18OCT2013   • Hiatal hernia     LAST ASSESSED: [id number]   • Hirsutism 09/26/2011   • Osteopenia of multiple sites 9/5/2012    Procedure/Onset: 05/22/2009   • Pleurisy     LAST ASSESSED: [id number]   • Pneumonia     LAST ASSESSED: 09MAR2015   • Umbilical hernia 09/26/2011     Past Surgical History:   Procedure Laterality Date   • APPENDECTOMY      LAST ASSESSED: [id number]   • CYSTECTOMY      OVARIAN   • INCISIONAL BREAST BIOPSY     • TONSILLECTOMY       Social History     Socioeconomic History   • Marital status: /Civil Union     Spouse name: Not on file   • Number of children: Not on file   • Years of education: Not on file   • Highest education level: Not on file   Occupational History   • Occupation: RETIRED WORKED AS    Tobacco Use   • Smoking status: Former     Current packs/day: 0.00     Average packs/day: 1 pack/day for 20.0 years (20.0 ttl pk-yrs)     Types: Cigarettes     Start date: 8/24/1962     Quit date: 7/17/1980      Years since quittin.0     Passive exposure: Never   • Smokeless tobacco: Never   • Tobacco comments:     QUIT AT THE AGE OF 52, DENIED: HISTORY OF SECOND HAND SMOKE EXPOSURE   Vaping Use   • Vaping status: Never Used   Substance and Sexual Activity   • Alcohol use: Yes     Comment: SOCIAL, DAILY   • Drug use: No   • Sexual activity: Not on file   Other Topics Concern   • Not on file   Social History Narrative    DENIED: HISTORY OF PETS/ANIMALS    DENIED: HISTORY OF TRAVEL HISTORY     Social Determinants of Health     Financial Resource Strain: Low Risk  (8/10/2023)    Overall Financial Resource Strain (CARDIA)    • Difficulty of Paying Living Expenses: Not very hard   Food Insecurity: Not on file   Transportation Needs: No Transportation Needs (8/10/2023)    PRAPARE - Transportation    • Lack of Transportation (Medical): No    • Lack of Transportation (Non-Medical): No   Physical Activity: Not on file   Stress: Not on file   Social Connections: Not on file   Intimate Partner Violence: Not on file   Housing Stability: Low Risk  (2024)    [facility]    Housing Stability    • Unstable Housing in the Last Year: Not on file     Family History   Problem Relation Age of Onset   • Cancer Mother    • Hypertension Mother         BENIGN ESSENTIAL HYPERTENSION WITH TARGET BLOOD PRESSURE BELOW 140/90   • Ovarian cancer Mother    • Hypertension Father         BENIGN ESSENTIAL HYPERTENSION WITH TARGET BLOOD PRESSURE BELOW 140/90     Allergies   Allergen Reactions   • Nuts - Food Allergy Other (See Comments), Hives and Anaphylaxis     Brazil nuts, hazelnut  Brazil nuts, hazelnut  Brazil nuts, hazelnut   • Cilostazol GI Intolerance     Stomach pain   • Amoxicillin-Pot Clavulanate Other (See Comments)   • Aspirin GI Bleeding     Stomach bleeding  Stomach bleeding  Stomach bleeding   • Bee Pollen Other (See Comments)   • Cimetidine      Reaction Date: 2008;    •  "Ciprofloxacin Other (See Comments)     Acid reflux  Acid reflux  Reaction Date: 22Dec2011; Annotation - 19Oct2015: pt had cramping.tc/cma   • Egg Solids, Whole Itching     Can eat egg but cannot touch.   • Eggs Or Egg-Derived Products - Food Allergy Other (See Comments)   • Levofloxacin Hives     \"Bad knee pain\"   • Other Hives     Coughing and gagging and sneezing   • Pollen Extract    • Sulfamethoxazole-Trimethoprim Other (See Comments)     Other reaction(s): Other (See Comments), Other (See Comments)       Objective     Vitals:    07/23/24 1502   BP: 130/62   BP Location: Left arm   Patient Position: Sitting   Cuff Size: Large   Pulse: 85   Resp: 18   Temp: 97.8 °F (36.6 °C)   TempSrc: Temporal   SpO2: 91%   Weight: 84.6 kg (186 lb 9.6 oz)   Height: 5' 4\" (1.626 m)       Physical Exam:     GENERAL: NAD, pleasant   HEENT:  NC/AT, PERRL, EOMI, no scleral icterus  CARDIAC:  RRR, +S1/S2, no S3/S4 appreciated, no m/g/r  PULMONARY:  CTA B/L, no wheezing/rales/rhonci, non-labored breathing  ABDOMEN:  Soft, NT/ND, no rebound/guarding/rigidity  Extremities:. No edema, cyanosis, or clubbing  Musculoskeletal:  Full range of motion intact in all extremities   NEUROLOGIC: Grossly intact, no focal deficits  SKIN:  No rashes or erythema noted on exposed skin  Psych: Normal affect, mood stable    ==  PLEASE NOTE:  This encounter was completed utilizing the [employer] Direct Speech Voice Recognition Software. Grammatical errors, random word insertions, pronoun errors and incomplete sentences are occasional consequences of the system due to software limitations, ambient noise and hardware issues.These may be missed by proof reading prior to affixing electronic signature. Any questions or concerns about the content, text or information contained within the body of this dictation should be directly addressed to the physician for clarification. Please do not hesitate to call me directly if you have any any questions or " concerns.    Julio Cesar Petty DO  [facility]   [facility]

## 2024-09-17 ENCOUNTER — NURSE TRIAGE (OUTPATIENT)
Age: 79
End: 2024-09-17

## 2024-09-17 NOTE — TELEPHONE ENCOUNTER
"Pt calling. She is c/o b/l ankle swelling, the R>L that she noticed yesterday. She states that she also has a purple discoloration on her right ankle that is 4 inches by 5 inches.  She was at the pulmonologist today and he recommended she monitor her ankle for the next few days.  Pt recently returned home from [place] so she was in the car  for a long drive. She already takes plavix daily.  She denies any new SOB.  She states that her right foot is the same temperature as her left foot.  She does not have any loss of sensation in her foot that is new.  Pt does not want to go to the ED to be evaluated.  Appt made for tomorrow morning with Dr. Ceron.     Reason for Disposition   [1] MILD swelling of both ankles (e.g., ankle joints look swollen; or bilateral mild pedal edema) AND [2] new-onset or getting worse  (Exceptions: Caused by hot weather, already seen by doctor or NP/PA for this)    Answer Assessment - Initial Assessment Questions  1. LOCATION: \"Which ankle is swollen?\" \"Where is the swelling?\"      B/l ankles, R>L  2. ONSET: \"When did the swelling start?\"      Yesterday   3. SWELLING: \"How bad is the swelling?\" Or, \"How large is it?\" (e.g., mild, moderate, severe; size of localized swelling)       Mild   4. PAIN: \"Is there any pain?\" If Yes, ask: \"How bad is it?\" (Scale 0-10; or none, mild, moderate, severe)      no  5. CAUSE: \"What do you think caused the ankle swelling?\"      I don't know   6. OTHER SYMPTOMS: \"Do you have any other symptoms?\" (e.g., fever, chest pain, difficulty breathing, calf pain)     Purple bruise on ankle   7. PREGNANCY: \"Is there any chance you are pregnant?\" \"When was your last menstrual period?\"      No    Protocols used: Ankle Swelling-Adult-AH    "

## 2024-09-18 ENCOUNTER — OFFICE VISIT (OUTPATIENT)
Dept: FAMILY MEDICINE CLINIC | Facility: CLINIC | Age: 79
End: 2024-09-18
Payer: MEDICARE

## 2024-09-18 VITALS
DIASTOLIC BLOOD PRESSURE: 68 MMHG | BODY MASS INDEX: 31.92 KG/M2 | HEART RATE: 82 BPM | RESPIRATION RATE: 12 BRPM | SYSTOLIC BLOOD PRESSURE: 150 MMHG | HEIGHT: 64 IN | OXYGEN SATURATION: 93 % | TEMPERATURE: 97.9 F | WEIGHT: 187 LBS

## 2024-09-18 DIAGNOSIS — M79.89 LEG SWELLING: ICD-10-CM

## 2024-09-18 DIAGNOSIS — J44.89 ASTHMA-COPD OVERLAP SYNDROME (HCC): Primary | ICD-10-CM

## 2024-09-18 PROCEDURE — G2211 COMPLEX E/M VISIT ADD ON: HCPCS | Performed by: FAMILY MEDICINE

## 2024-09-18 PROCEDURE — 99213 OFFICE O/P EST LOW 20 MIN: CPT | Performed by: FAMILY MEDICINE

## 2024-09-18 NOTE — PROGRESS NOTES
Assessment/Plan:  Diagnoses and all orders for this visit:    Asthma-COPD overlap syndrome  Comments:  Pulm consult -Dr. Green apprec.  complete abx/steroid taper/inhalers/neb txs    Leg swelling  Comments:  compression stockings /leg elevation    BMI 32.0-32.9,adult        Subjective:      Patient ID: Polly Mitchell is a 79 y.o. female.    Chief Complaint   Patient presents with    Leg Swelling     Patient c/o LT leg swollen      Cough     X1 week       Cough  This is a recurrent problem. The current episode started in the past 7 days. The cough is Productive of sputum. Associated symptoms include heartburn, nasal congestion, postnasal drip and wheezing. Pertinent negatives include no fever, hemoptysis or shortness of breath. She has tried ipratropium inhaler, rest, cool air, a beta-agonist inhaler, oral steroids and leukotriene antagonists for the symptoms.   Recent evaluation by pulmonologist--Dr. Jj Green--consult and recommendtaions appreciated  No rash or fever  Leg swelling-left> right --mainly left ankle  Denies CP    The following portions of the patient's history were reviewed and updated as appropriate: allergies, current medications, past family history, past medical history, past social history, past surgical history and problem list.    Review of Systems   Constitutional:  Negative for fever.   HENT:  Positive for postnasal drip.    Respiratory:  Positive for cough and wheezing. Negative for hemoptysis and shortness of breath.    Gastrointestinal:  Positive for heartburn.         Current Outpatient Medications   Medication Sig Dispense Refill    albuterol (ProAir HFA) 90 mcg/act inhaler Inhale 2 puffs every 4 (four) hours as needed for wheezing 54 g 3    amLODIPine (NORVASC) 5 mg tablet TAKE 1 TABLET BY MOUTH TWICE  DAILY 200 tablet 1    b complex vitamins capsule Take 1 capsule by mouth        betamethasone dipropionate (DIPROSONE) 0.05 % cream Apply topically 2 (two) times a day 30 g 5     Biotin 5000 MCG CAPS Take 2 capsules by mouth daily        budesonide (PULMICORT) 0.5 mg/2 mL nebulizer solution USE 2 ML IN NEBULIZER TWICE DAILY      CALCIUM PO Take 600 mg by mouth        cetirizine (ZyrTEC) 10 mg tablet       Cholecalciferol (VITAMIN D3 PO) 1 capsule every 24 hours      clotrimazole-betamethasone (LOTRISONE) 1-0.05 % cream Apply topically 2 (two) times a day 45 g 1    gabapentin (NEURONTIN) 100 mg capsule Take 1 capsule (100 mg total) by mouth daily at bedtime 90 capsule 3    losartan (COZAAR) 100 MG tablet Take 100 mg by mouth daily       Misc Natural Products (ELDERBERRY IMMUNE COMPLEX PO)       montelukast (SINGULAIR) 10 mg tablet TAKE 1 TABLET BY MOUTH DAILY AT  BEDTIME 100 tablet 1    Plavix 75 MG tablet       rosuvastatin (CRESTOR) 5 mg tablet Take 1 tablet (5 mg total) by mouth daily 90 tablet 3    sodium chloride 3 % inhalation solution Take 4 mL by nebulization as needed for cough sometimes      Trelegy Ellipta 200-62.5-25 MCG/INH AEPB inhaler       VITAMIN B1-B12 PO Take by mouth      ALPRAZolam (XANAX) 0.25 mg tablet TAKE 1 TABLET BY MOUTH TWICE  DAILY AS NEEDED FOR ANXIETY (Patient not taking: Reported on 7/23/2024) 90 tablet 0    DIMETHICONE, TOPICAL, 3 % LOTN Apply topically (Patient not taking: Reported on 7/23/2024)      erythromycin (ILOTYCIN) ophthalmic ointment APPLY OINTMENT INTO EACH EYE AT BEDTIME (Patient not taking: Reported on 7/23/2024)      ipratropium-albuterol (DUO-NEB) 0.5-2.5 mg/3 mL nebulizer solution USE 1 AMPULE IN NEBULIZER EVERY 6 HOURS AS NEEDED FOR WHEEZING FOR SHORTNESS OF BREATH 270 mL 0    triamcinolone (KENALOG) 0.1 % cream APPLY CREAM EXTERNALLY TO AFFECTED AREA TWICE DAILY AS NEEDED (Patient not taking: Reported on 7/23/2024)       No current facility-administered medications for this visit.     Allergies   Allergen Reactions    Nuts - Food Allergy Other (See Comments), Hives and Anaphylaxis     Brazil nuts, hazelnut  Brazil nuts, hazelnut  Brazil  "nuts, hazelnut    Cilostazol GI Intolerance     Stomach pain    Amoxicillin-Pot Clavulanate Other (See Comments)    Aspirin GI Bleeding     Stomach bleeding  Stomach bleeding  Stomach bleeding    Bee Pollen Other (See Comments)    Cimetidine      Reaction Date: 25Aug2008;     Ciprofloxacin Other (See Comments)     Acid reflux  Acid reflux  Reaction Date: 22Dec2011; Annotation - 19Oct2015: pt had cramping.tc/cma    Egg Solids, Whole Itching     Can eat egg but cannot touch.    Eggs Or Egg-Derived Products - Food Allergy Other (See Comments)    Levofloxacin Hives     \"Bad knee pain\"    Other Hives     Coughing and gagging and sneezing    Pollen Extract     Sulfamethoxazole-Trimethoprim Other (See Comments)     Other reaction(s): Other (See Comments), Other (See Comments)     Immunization History   Administered Date(s) Administered    COVID-19 PFIZER VACCINE 0.3 ML IM 02/21/2021, 03/14/2021, 10/26/2021, 11/13/2022    Influenza Split High Dose Preservative Free IM 10/06/2021    Influenza, recombinant, quadrivalent,injectable, preservative free 10/08/2020    Pneumococcal Conjugate 13-Valent 01/06/2016    Pneumococcal Polysaccharide PPV23 05/22/2013    Tdap 12/14/2012, 05/27/2023       Objective:    /68 (BP Location: Left arm, Patient Position: Sitting, Cuff Size: Large)   Pulse 82   Temp 97.9 °F (36.6 °C) (Temporal)   Resp 12   Ht 5' 4\" (1.626 m)   Wt 84.8 kg (187 lb)   SpO2 93%   BMI 32.10 kg/m²        Physical Exam  Vitals and nursing note reviewed.   Constitutional:       Comments: OW   HENT:      Nose: Congestion present.      Mouth/Throat:      Pharynx: No oropharyngeal exudate or posterior oropharyngeal erythema.   Cardiovascular:      Rate and Rhythm: Normal rate and regular rhythm.   Pulmonary:      Effort: Pulmonary effort is normal.      Breath sounds: Wheezing present.   Abdominal:      General: Bowel sounds are normal.      Palpations: Abdomen is soft.      Tenderness: There is no abdominal " tenderness.   Musculoskeletal:      Right lower leg: Edema (no palpable cords) present.      Left lower leg: Edema (no palpable cords) present.   Skin:     General: Skin is warm and dry.      Coloration: Skin is not jaundiced.      Findings: Rash present. No erythema.   Neurological:      Mental Status: She is alert.              Nehal Ceron MD

## 2024-09-20 DIAGNOSIS — R06.2 WHEEZE: ICD-10-CM

## 2024-09-20 DIAGNOSIS — J45.909 UNCOMPLICATED ASTHMA, UNSPECIFIED ASTHMA SEVERITY, UNSPECIFIED WHETHER PERSISTENT: ICD-10-CM

## 2024-09-20 RX ORDER — IPRATROPIUM BROMIDE AND ALBUTEROL SULFATE 2.5; .5 MG/3ML; MG/3ML
SOLUTION RESPIRATORY (INHALATION)
Qty: 270 ML | Refills: 0 | Status: SHIPPED | OUTPATIENT
Start: 2024-09-20

## 2024-09-26 PROBLEM — M79.89 LEFT LEG SWELLING: Status: ACTIVE | Noted: 2024-09-26

## 2024-10-17 DIAGNOSIS — J45.909 UNCOMPLICATED ASTHMA, UNSPECIFIED ASTHMA SEVERITY, UNSPECIFIED WHETHER PERSISTENT: ICD-10-CM

## 2024-10-17 DIAGNOSIS — R06.2 WHEEZE: ICD-10-CM

## 2024-10-17 RX ORDER — IPRATROPIUM BROMIDE AND ALBUTEROL SULFATE 2.5; .5 MG/3ML; MG/3ML
SOLUTION RESPIRATORY (INHALATION)
Qty: 270 ML | Refills: 0 | Status: SHIPPED | OUTPATIENT
Start: 2024-10-17

## 2024-10-18 DIAGNOSIS — J45.909 UNCOMPLICATED ASTHMA, UNSPECIFIED ASTHMA SEVERITY, UNSPECIFIED WHETHER PERSISTENT: ICD-10-CM

## 2024-10-18 DIAGNOSIS — R06.2 WHEEZE: ICD-10-CM

## 2024-10-18 RX ORDER — IPRATROPIUM BROMIDE AND ALBUTEROL SULFATE 2.5; .5 MG/3ML; MG/3ML
SOLUTION RESPIRATORY (INHALATION)
Qty: 270 ML | Refills: 0 | OUTPATIENT
Start: 2024-10-18

## 2024-10-30 ENCOUNTER — TELEPHONE (OUTPATIENT)
Dept: FAMILY MEDICINE CLINIC | Facility: CLINIC | Age: 79
End: 2024-10-30

## 2024-11-29 DIAGNOSIS — J45.909 MODERATE ASTHMA WITHOUT COMPLICATION, UNSPECIFIED WHETHER PERSISTENT: ICD-10-CM

## 2024-11-29 RX ORDER — MONTELUKAST SODIUM 10 MG/1
10 TABLET ORAL
Qty: 90 TABLET | Refills: 3 | Status: SHIPPED | OUTPATIENT
Start: 2024-11-29

## 2024-12-13 DIAGNOSIS — R06.2 WHEEZE: ICD-10-CM

## 2024-12-13 DIAGNOSIS — J45.909 UNCOMPLICATED ASTHMA, UNSPECIFIED ASTHMA SEVERITY, UNSPECIFIED WHETHER PERSISTENT: ICD-10-CM

## 2024-12-13 RX ORDER — IPRATROPIUM BROMIDE AND ALBUTEROL SULFATE 2.5; .5 MG/3ML; MG/3ML
SOLUTION RESPIRATORY (INHALATION)
Qty: 270 ML | Refills: 1 | Status: SHIPPED | OUTPATIENT
Start: 2024-12-13

## 2025-01-07 DIAGNOSIS — I10 ESSENTIAL HYPERTENSION: ICD-10-CM

## 2025-01-08 RX ORDER — AMLODIPINE BESYLATE 5 MG/1
5 TABLET ORAL 2 TIMES DAILY
Qty: 180 TABLET | Refills: 1 | Status: SHIPPED | OUTPATIENT
Start: 2025-01-08

## 2025-01-22 ENCOUNTER — TELEPHONE (OUTPATIENT)
Age: 80
End: 2025-01-22

## 2025-01-22 ENCOUNTER — APPOINTMENT (OUTPATIENT)
Dept: LAB | Facility: CLINIC | Age: 80
End: 2025-01-22
Payer: MEDICARE

## 2025-01-22 ENCOUNTER — OFFICE VISIT (OUTPATIENT)
Dept: FAMILY MEDICINE CLINIC | Facility: CLINIC | Age: 80
End: 2025-01-22
Payer: MEDICARE

## 2025-01-22 VITALS
TEMPERATURE: 97.5 F | HEIGHT: 64 IN | BODY MASS INDEX: 29.37 KG/M2 | OXYGEN SATURATION: 91 % | WEIGHT: 172 LBS | DIASTOLIC BLOOD PRESSURE: 62 MMHG | HEART RATE: 84 BPM | RESPIRATION RATE: 12 BRPM | SYSTOLIC BLOOD PRESSURE: 130 MMHG

## 2025-01-22 DIAGNOSIS — Z00.00 MEDICARE ANNUAL WELLNESS VISIT, SUBSEQUENT: Primary | ICD-10-CM

## 2025-01-22 DIAGNOSIS — B36.9 FUNGAL DERMATITIS: ICD-10-CM

## 2025-01-22 DIAGNOSIS — I10 ESSENTIAL HYPERTENSION: ICD-10-CM

## 2025-01-22 DIAGNOSIS — J44.89 ASTHMA-COPD OVERLAP SYNDROME (HCC): ICD-10-CM

## 2025-01-22 DIAGNOSIS — R73.03 PREDIABETES: ICD-10-CM

## 2025-01-22 DIAGNOSIS — Z13.0 SCREENING FOR DEFICIENCY ANEMIA: ICD-10-CM

## 2025-01-22 DIAGNOSIS — I73.9 PAD (PERIPHERAL ARTERY DISEASE) (HCC): ICD-10-CM

## 2025-01-22 DIAGNOSIS — Z13.29 SCREENING FOR THYROID DISORDER: ICD-10-CM

## 2025-01-22 DIAGNOSIS — R73.09 ABNORMAL GLUCOSE: ICD-10-CM

## 2025-01-22 DIAGNOSIS — E78.2 MIXED HYPERLIPIDEMIA: ICD-10-CM

## 2025-01-22 DIAGNOSIS — Z12.31 ENCOUNTER FOR SCREENING MAMMOGRAM FOR BREAST CANCER: ICD-10-CM

## 2025-01-22 PROCEDURE — G0439 PPPS, SUBSEQ VISIT: HCPCS | Performed by: FAMILY MEDICINE

## 2025-01-22 RX ORDER — ALBUTEROL SULFATE 90 UG/1
2 INHALANT RESPIRATORY (INHALATION) EVERY 4 HOURS PRN
Qty: 54 G | Refills: 3 | Status: SHIPPED | OUTPATIENT
Start: 2025-01-22

## 2025-01-22 RX ORDER — NYSTATIN 100000 [USP'U]/G
POWDER TOPICAL 4 TIMES DAILY
Qty: 60 G | Refills: 1 | Status: SHIPPED | OUTPATIENT
Start: 2025-01-22

## 2025-01-22 NOTE — PATIENT INSTRUCTIONS
Medicare Preventive Visit Patient Instructions  Thank you for completing your Welcome to Medicare Visit or Medicare Annual Wellness Visit today. Your next wellness visit will be due in one year (1/23/2026).  The screening/preventive services that you may require over the next 5-10 years are detailed below. Some tests may not apply to you based off risk factors and/or age. Screening tests ordered at today's visit but not completed yet may show as past due. Also, please note that scanned in results may not display below.  Preventive Screenings:  Service Recommendations Previous Testing/Comments   Colorectal Cancer Screening  * Colonoscopy    * Fecal Occult Blood Test (FOBT)/Fecal Immunochemical Test (FIT)  * Fecal DNA/Cologuard Test  * Flexible Sigmoidoscopy Age: 45-75 years old   Colonoscopy: every 10 years (may be performed more frequently if at higher risk)  OR  FOBT/FIT: every 1 year  OR  Cologuard: every 3 years  OR  Sigmoidoscopy: every 5 years  Screening may be recommended earlier than age 45 if at higher risk for colorectal cancer. Also, an individualized decision between you and your healthcare provider will decide whether screening between the ages of 76-85 would be appropriate. Colonoscopy: 06/22/2022  FOBT/FIT: Not on file  Cologuard: 04/19/2022  Sigmoidoscopy: Not on file          Breast Cancer Screening Age: 40+ years old  Frequency: every 1-2 years  Not required if history of left and right mastectomy Mammogram: 05/10/2022        Cervical Cancer Screening Between the ages of 21-29, pap smear recommended once every 3 years.   Between the ages of 30-65, can perform pap smear with HPV co-testing every 5 years.   Recommendations may differ for women with a history of total hysterectomy, cervical cancer, or abnormal pap smears in past. Pap Smear: Not on file    Screening Not Indicated   Hepatitis C Screening Once for adults born between 1945 and 1965  More frequently in patients at high risk for Hepatitis C  Hep C Antibody: 08/03/2020    Screening Current   Diabetes Screening 1-2 times per year if you're at risk for diabetes or have pre-diabetes Fasting glucose: No results in last 5 years (No results in last 5 years)  A1C: 5.8 (2/9/2024)  Screening Not Indicated  History Diabetes   Cholesterol Screening Once every 5 years if you don't have a lipid disorder. May order more often based on risk factors. Lipid panel: 08/09/2023    Screening Not Indicated  History Lipid Disorder     Other Preventive Screenings Covered by Medicare:  Abdominal Aortic Aneurysm (AAA) Screening: covered once if your at risk. You're considered to be at risk if you have a family history of AAA.  Lung Cancer Screening: covers low dose CT scan once per year if you meet all of the following conditions: (1) Age 55-77; (2) No signs or symptoms of lung cancer; (3) Current smoker or have quit smoking within the last 15 years; (4) You have a tobacco smoking history of at least 20 pack years (packs per day multiplied by number of years you smoked); (5) You get a written order from a healthcare provider.  Glaucoma Screening: covered annually if you're considered high risk: (1) You have diabetes OR (2) Family history of glaucoma OR (3)  aged 50 and older OR (4)  American aged 65 and older  Osteoporosis Screening: covered every 2 years if you meet one of the following conditions: (1) You're estrogen deficient and at risk for osteoporosis based off medical history and other findings; (2) Have a vertebral abnormality; (3) On glucocorticoid therapy for more than 3 months; (4) Have primary hyperparathyroidism; (5) On osteoporosis medications and need to assess response to drug therapy.   Last bone density test (DXA Scan): 03/17/2022.  HIV Screening: covered annually if you're between the age of 15-65. Also covered annually if you are younger than 15 and older than 65 with risk factors for HIV infection. For pregnant patients, it is covered  up to 3 times per pregnancy.    Immunizations:  Immunization Recommendations   Influenza Vaccine Annual influenza vaccination during flu season is recommended for all persons aged >= 6 months who do not have contraindications   Pneumococcal Vaccine   * Pneumococcal conjugate vaccine = PCV13 (Prevnar 13), PCV15 (Vaxneuvance), PCV20 (Prevnar 20)  * Pneumococcal polysaccharide vaccine = PPSV23 (Pneumovax) Adults 19-63 yo with certain risk factors or if 65+ yo  If never received any pneumonia vaccine: recommend Prevnar 20 (PCV20)  Give PCV20 if previously received 1 dose of PCV13 or PPSV23   Hepatitis B Vaccine 3 dose series if at intermediate or high risk (ex: diabetes, end stage renal disease, liver disease)   Respiratory syncytial virus (RSV) Vaccine - COVERED BY MEDICARE PART D  * RSVPreF3 (Arexvy) CDC recommends that adults 60 years of age and older may receive a single dose of RSV vaccine using shared clinical decision-making (SCDM)   Tetanus (Td) Vaccine - COST NOT COVERED BY MEDICARE PART B Following completion of primary series, a booster dose should be given every 10 years to maintain immunity against tetanus. Td may also be given as tetanus wound prophylaxis.   Tdap Vaccine - COST NOT COVERED BY MEDICARE PART B Recommended at least once for all adults. For pregnant patients, recommended with each pregnancy.   Shingles Vaccine (Shingrix) - COST NOT COVERED BY MEDICARE PART B  2 shot series recommended in those 19 years and older who have or will have weakened immune systems or those 50 years and older     Health Maintenance Due:      Topic Date Due   • Breast Cancer Screening: Mammogram  05/10/2023   • DXA SCAN  03/17/2024   • Hepatitis C Screening  Completed   • Colorectal Cancer Screening  Discontinued     Immunizations Due:      Topic Date Due   • COVID-19 Vaccine (5 - 2024-25 season) 09/01/2024     Advance Directives   What are advance directives?  Advance directives are legal documents that state your  wishes and plans for medical care. These plans are made ahead of time in case you lose your ability to make decisions for yourself. Advance directives can apply to any medical decision, such as the treatments you want, and if you want to donate organs.   What are the types of advance directives?  There are many types of advance directives, and each state has rules about how to use them. You may choose a combination of any of the following:  Living will:  This is a written record of the treatment you want. You can also choose which treatments you do not want, which to limit, and which to stop at a certain time. This includes surgery, medicine, IV fluid, and tube feedings.   Durable power of  for healthcare (DPAHC):  This is a written record that states who you want to make healthcare choices for you when you are unable to make them for yourself. This person, called a proxy, is usually a family member or a friend. You may choose more than 1 proxy.  Do not resuscitate (DNR) order:  A DNR order is used in case your heart stops beating or you stop breathing. It is a request not to have certain forms of treatment, such as CPR. A DNR order may be included in other types of advance directives.  Medical directive:  This covers the care that you want if you are in a coma, near death, or unable to make decisions for yourself. You can list the treatments you want for each condition. Treatment may include pain medicine, surgery, blood transfusions, dialysis, IV or tube feedings, and a ventilator (breathing machine).  Values history:  This document has questions about your views, beliefs, and how you feel and think about life. This information can help others choose the care that you would choose.  Why are advance directives important?  An advance directive helps you control your care. Although spoken wishes may be used, it is better to have your wishes written down. Spoken wishes can be misunderstood, or not followed.  Treatments may be given even if you do not want them. An advance directive may make it easier for your family to make difficult choices about your care.   Fall Prevention    Fall prevention  includes ways to make your home and other areas safer. It also includes ways you can move more carefully to prevent a fall. Health conditions that cause changes in your blood pressure, vision, or muscle strength and coordination may increase your risk for falls. Medicines may also increase your risk for falls if they make you dizzy, weak, or sleepy.   Fall prevention tips:   Stand or sit up slowly.    Use assistive devices as directed.    Wear shoes that fit well and have soles that .    Wear a personal alarm.    Stay active.    Manage your medical conditions.    Home Safety Tips:  Add items to prevent falls in the bathroom.    Keep paths clear.    Install bright lights in your home.    Keep items you use often on shelves within reach.    Paint or place reflective tape on the edges of your stairs.    Weight Management   Why it is important to manage your weight:  Being overweight increases your risk of health conditions such as heart disease, high blood pressure, type 2 diabetes, and certain types of cancer. It can also increase your risk for osteoarthritis, sleep apnea, and other respiratory problems. Aim for a slow, steady weight loss. Even a small amount of weight loss can lower your risk of health problems.  How to lose weight safely:  A safe and healthy way to lose weight is to eat fewer calories and get regular exercise. You can lose up about 1 pound a week by decreasing the number of calories you eat by 500 calories each day.   Healthy meal plan for weight management:  A healthy meal plan includes a variety of foods, contains fewer calories, and helps you stay healthy. A healthy meal plan includes the following:  Eat whole-grain foods more often.  A healthy meal plan should contain fiber. Fiber is the part of grains,  fruits, and vegetables that is not broken down by your body. Whole-grain foods are healthy and provide extra fiber in your diet. Some examples of whole-grain foods are whole-wheat breads and pastas, oatmeal, brown rice, and bulgur.  Eat a variety of vegetables every day.  Include dark, leafy greens such as spinach, kale, angela greens, and mustard greens. Eat yellow and orange vegetables such as carrots, sweet potatoes, and winter squash.   Eat a variety of fruits every day.  Choose fresh or canned fruit (canned in its own juice or light syrup) instead of juice. Fruit juice has very little or no fiber.  Eat low-fat dairy foods.  Drink fat-free (skim) milk or 1% milk. Eat fat-free yogurt and low-fat cottage cheese. Try low-fat cheeses such as mozzarella and other reduced-fat cheeses.  Choose meat and other protein foods that are low in fat.  Choose beans or other legumes such as split peas or lentils. Choose fish, skinless poultry (chicken or turkey), or lean cuts of red meat (beef or pork). Before you cook meat or poultry, cut off any visible fat.   Use less fat and oil.  Try baking foods instead of frying them. Add less fat, such as margarine, sour cream, regular salad dressing and mayonnaise to foods. Eat fewer high-fat foods. Some examples of high-fat foods include french fries, doughnuts, ice cream, and cakes.  Eat fewer sweets.  Limit foods and drinks that are high in sugar. This includes candy, cookies, regular soda, and sweetened drinks.  Exercise:  Exercise at least 30 minutes per day on most days of the week. Some examples of exercise include walking, biking, dancing, and swimming. You can also fit in more physical activity by taking the stairs instead of the elevator or parking farther away from stores. Ask your healthcare provider about the best exercise plan for you.   Alcohol Use and Your Health    Drinking too much can harm your health.  Excessive alcohol use leads to about 88,000 death in the [place] each year, and shortens the life of those who diet by almost 30 years.  Further, excessive drinking cost the economy $249 billion in 2010.  Most excessive drinkers are not alcohol dependent.    Excessive alcohol use has immediate effects that increase the risk of many harmful health conditions.  These are most often the result of binge drinking.  Over time, excessive alcohol use can lead to the development of chronic diseases and other series health problems.    What is considered a \"drink\"?        Excessive alcohol use includes:  Binge Drinking: For women, 4 or more drinks consumed on one occasion. For men, 5 or more drinks consumed on one occasion.  Heavy Drinking: For women, 8 or more drinks per week. For men, 15 or more drinks per week  Any alcohol used by pregnant women  Any alcohol used by those under the age of 21 years    If you choose to drink, do so in moderation:  Do not drink at all if you are under the age of 21, or if you are or may be pregnant, or have health problems that could be made worse by drinking.  For women, up to 1 drink per day  For men, up to 2 drinks a day    No one should begin drinking or drink more frequently based on potential health benefits    Short-Term Health Risks:  Injuries: motor vehicle crashes, falls, drownings, burns  Violence: homicide, suicide, sexual assault, intimate partner violence  Alcohol poisoning  Reproductive health: risky sexual behaviors, unintended prengnacy, sexually transmitted diseases, miscarriage, stillbirth, fetal alcohol syndrome    Long-Term Health Risks:  Chronic diseases: high blood pressure, heart disease, stroke, liver disease, digestive problems  Cancers: breast, mouth and throat, liver, colon  Learning and memory problems: dementia, poor school performance  Mental health: depression, anxiety, insomnia  Social problems: lost productivity, family problems, unemployment  Alcohol dependence    For support and more information:  Substance Abuse " and Mental Health Services Administration  [address]  [place] [address]  Web Address: http://www.[facility].gov    Alcoholics Anonymous        Web Address: http://www.aa.org    https://www.cdc.gov/alcohol/fact-sheets/alcohol-use.htm     © Copyright [employer] 2018 Information is for End User's use only and may not be sold, redistributed or otherwise used for commercial purposes. All illustrations and images included in CareNotes® are the copyrighted property of A.D.A.M., Inc. or [employer]

## 2025-01-22 NOTE — PROGRESS NOTES
Name: Polly Mitchell      : 1945      MRN: [id number]  Encounter Provider: Nehal Ceron MD  Encounter Date: 2025   Encounter department: [facility]    Assessment & Plan  Medicare annual wellness visit, subsequent         Asthma-COPD overlap syndrome (HCC)  Follows w pulm.--Dr. Tavares Green  Orders:  •  albuterol (ProAir HFA) 90 mcg/act inhaler; Inhale 2 puffs every 4 (four) hours as needed for wheezing    Encounter for screening mammogram for breast cancer    Orders:  •  Mammo screening bilateral w 3d and cad; Future    PAD (peripheral artery disease) (HCC)  Follows w vascular specialist       Fungal dermatitis    Orders:  •  nystatin (MYCOSTATIN) powder; Apply topically 4 (four) times a day    Essential hypertension    Orders:  •  Comprehensive metabolic panel; Future  •  Magnesium; Future  •  UA (URINE) with reflex to Scope; Future    Prediabetes    Orders:  •  Comprehensive metabolic panel; Future  •  Hemoglobin A1C; Future  •  UA (URINE) with reflex to Scope; Future    Abnormal glucose    Orders:  •  Comprehensive metabolic panel; Future  •  Hemoglobin A1C; Future  •  UA (URINE) with reflex to Scope; Future    Screening for deficiency anemia    Orders:  •  CBC; Future  •  Magnesium; Future    Mixed hyperlipidemia    Orders:  •  Lipase; Future  •  Lipid Panel with Direct LDL reflex; Future  •  TSH, 3rd generation; Future    Screening for thyroid disorder    Orders:  •  TSH, 3rd generation; Future    BMI 29.0-29.9,adult            Preventive health issues were discussed with patient, and age appropriate screening tests were ordered as noted in patient's After Visit Summary. Personalized health advice and appropriate referrals for health education or preventive services given if needed, as noted in patient's After Visit Summary.    History of Present Illness     HPI   Patient Care Team:  Nehal Ceron MD as PCP - SHERRY Dumont,  MD Jennifer Munoz MD    Review of Systems  Medical History Reviewed by provider this encounter:  Tobacco  Allergies  Meds  Problems  Med Hx  Surg Hx  Fam Hx       Annual Wellness Visit Questionnaire   Polly is here for her Subsequent Wellness visit. Last Medicare Wellness visit information reviewed, patient interviewed and updates made to the record today.      Health Risk Assessment:   Patient rates overall health as fair. Patient feels that their physical health rating is slightly worse. Patient is satisfied with their life. Eyesight was rated as same. Hearing was rated as same. Patient feels that their emotional and mental health rating is same. Patients states they are never, rarely angry. Patient states they are sometimes unusually tired/fatigued. Pain experienced in the last 7 days has been none. Patient states that she has experienced no weight loss or gain in last 6 months.     Depression Screening:   PHQ-2 Score: 0      Fall Risk Screening:   In the past year, patient has experienced: history of falling in past year      Urinary Incontinence Screening:   Patient has not leaked urine accidently in the last six months.     Home Safety:  Patient has trouble with stairs inside or outside of their home. Patient has working smoke alarms and has working carbon monoxide detector. Home safety hazards include: none.     Nutrition:   Current diet is Regular.     Medications:   Patient is currently taking over-the-counter supplements. OTC medications include: see medication list. Patient is able to manage medications.     Activities of Daily Living (ADLs)/Instrumental Activities of Daily Living (IADLs):   Walk and transfer into and out of bed and chair?: Yes  Dress and groom yourself?: Yes    Bathe or shower yourself?: Yes    Feed yourself? Yes  Do your laundry/housekeeping?: Yes  Manage your money, pay your bills and track your expenses?: Yes  Make your own meals?: Yes    Do your own shopping?: Yes    Previous  Hospitalizations:   Any hospitalizations or ED visits within the last 12 months?: No      Advance Care Planning:   Living will: Yes    Durable POA for healthcare: Yes    Advanced directive: Yes    Advanced directive counseling given: Yes    ACP document given: Yes      Cognitive Screening:   Provider or family/friend/caregiver concerned regarding cognition?: No    PREVENTIVE SCREENINGS      Cardiovascular Screening:    General: History Lipid Disorder and Screening Current      Diabetes Screening:     General: History Diabetes and Screening Current      Colorectal Cancer Screening:     General: Risks and Benefits Discussed      Cervical Cancer Screening:    General: Screening Not Indicated      Osteoporosis Screening:    General: Screening Not Indicated and History Osteoporosis      Abdominal Aortic Aneurysm (AAA) Screening:        General: Risks and Benefits Discussed      Lung Cancer Screening:     General: Screening Not Indicated      Hepatitis C Screening:    General: Screening Current    Screening, Brief Intervention, and Referral to Treatment (SBIRT)    Screening  Typical number of drinks in a day: 1  Typical number of drinks in a week: 4  Interpretation: Low risk drinking behavior.    AUDIT-C Screenin) How often did you have a drink containing alcohol in the past year? 2 to 3 times a week  2) How many drinks did you have on a typical day when you were drinking in the past year? 0  3) How often did you have 6 or more drinks on one occasion in the past year? never    AUDIT-C Score: 3  Interpretation: Score 3-12 (female): POSITIVE screen for alcohol misuse    AUDIT Screenin) How often during the last year have you found that you were not able to stop drinking once you had started? 0 - never  5) How often during the last year have you failed to do what was normally expected from you because of drinking? 0 - never  6) How often during the last year have you needed a first drink in the morning to get  yourself going after a heavy drinking session? 0 - never  7) How often during the last year have you had a feeling of guilt or remorse after drinking? 0 - never  8) How often during the last year have you been unable to remember what happened the night before because you had been drinking? 0 - never  9) Have you or someone else been injured as a result of your drinking? 0 - no  10) Has a relative or friend or a doctor or another health worker been concerned about your drinking or suggested you cut down? 0 - no    AUDIT Score: 3  Interpretation: Low risk alcohol consumption    Single Item Drug Screening:  How often have you used an illegal drug (including marijuana) or a prescription medication for non-medical reasons in the past year? never    Single Item Drug Screen Score: 0  Interpretation: Negative screen for possible drug use disorder    Brief Intervention  Alcohol & drug use screenings were reviewed. No concerns regarding substance use disorder identified.     Social Drivers of Health     Financial Resource Strain: Low Risk  (8/10/2023)    Overall Financial Resource Strain (CARDIA)    • Difficulty of Paying Living Expenses: Not very hard   Food Insecurity: No Food Insecurity (1/22/2025)    Hunger Vital Sign    • Worried About Running Out of Food in the Last Year: Never true    • Ran Out of Food in the Last Year: Never true   Transportation Needs: No Transportation Needs (1/22/2025)    PRAPARE - Transportation    • Lack of Transportation (Medical): No    • Lack of Transportation (Non-Medical): No   Housing Stability: Low Risk  (1/22/2025)    Housing Stability Vital Sign    • Unable to Pay for Housing in the Last Year: No    • Number of Times Moved in the Last Year: 0    • Homeless in the Last Year: No   Utilities: Not At Risk (1/22/2025)    [facility] Utilities    • Threatened with loss of utilities: No     No results found.    Objective   /62 (BP Location: Left arm, Patient Position: Sitting, Cuff Size: Large)   " Pulse 84   Temp 97.5 °F (36.4 °C) (Temporal)   Resp 12   Ht 5' 4\" (1.626 m)   Wt 78 kg (172 lb)   SpO2 91%   BMI 29.52 kg/m²     Physical Exam  Vitals and nursing note reviewed.   Constitutional:       General: She is not in acute distress.     Appearance: She is obese.   Cardiovascular:      Rate and Rhythm: Normal rate and regular rhythm.   Pulmonary:      Effort: Pulmonary effort is normal. No respiratory distress.      Breath sounds: Wheezing present.   Abdominal:      General: Bowel sounds are normal.      Palpations: Abdomen is soft.      Tenderness: There is no abdominal tenderness.   Musculoskeletal:      Cervical back: Neck supple.   Skin:     General: Skin is warm and dry.      Coloration: Skin is not jaundiced.   Neurological:      General: No focal deficit present.      Mental Status: She is alert and oriented to person, place, and time.      Cranial Nerves: No cranial nerve deficit.   Psychiatric:         Mood and Affect: Mood normal.         "

## 2025-01-22 NOTE — TELEPHONE ENCOUNTER
Pt. Forgot the name of the soap Dr. Ceron told her to get.  Could you please find out for her and call her back to let her know.  Pls leave a message with the name of the soap on her VM if she doesn't answer.   Ty.

## 2025-01-28 NOTE — ASSESSMENT & PLAN NOTE
Orders:  •  Lipase; Future  •  Lipid Panel with Direct LDL reflex; Future  •  TSH, 3rd generation; Future

## 2025-01-28 NOTE — ASSESSMENT & PLAN NOTE
Orders:  •  Comprehensive metabolic panel; Future  •  Hemoglobin A1C; Future  •  UA (URINE) with reflex to Scope; Future

## 2025-01-28 NOTE — ASSESSMENT & PLAN NOTE
Follows [facility].--Dr. Tavares Green  Orders:  •  albuterol (ProAir HFA) 90 mcg/act inhaler; Inhale 2 puffs every 4 (four) hours as needed for wheezing

## 2025-01-30 ENCOUNTER — TELEPHONE (OUTPATIENT)
Age: 80
End: 2025-01-30

## 2025-01-30 NOTE — TELEPHONE ENCOUNTER
Please inform all labs are within normal/acceptable range.  There is slight increase in white blood cell count but that could be due to the steroid injection she had in knee day prior.

## 2025-03-24 DIAGNOSIS — J45.909 UNCOMPLICATED ASTHMA, UNSPECIFIED ASTHMA SEVERITY, UNSPECIFIED WHETHER PERSISTENT: ICD-10-CM

## 2025-03-24 DIAGNOSIS — R06.2 WHEEZE: ICD-10-CM

## 2025-03-25 ENCOUNTER — PATIENT MESSAGE (OUTPATIENT)
Dept: FAMILY MEDICINE CLINIC | Facility: CLINIC | Age: 80
End: 2025-03-25

## 2025-03-25 RX ORDER — IPRATROPIUM BROMIDE AND ALBUTEROL SULFATE 2.5; .5 MG/3ML; MG/3ML
SOLUTION RESPIRATORY (INHALATION)
Qty: 270 ML | Refills: 0 | Status: SHIPPED | OUTPATIENT
Start: 2025-03-25

## 2025-03-27 DIAGNOSIS — B37.2 YEAST DERMATITIS: ICD-10-CM

## 2025-03-27 RX ORDER — CLOTRIMAZOLE AND BETAMETHASONE DIPROPIONATE 10; .64 MG/G; MG/G
CREAM TOPICAL 2 TIMES DAILY
Qty: 45 G | Refills: 1 | Status: SHIPPED | OUTPATIENT
Start: 2025-03-27

## 2025-04-01 DIAGNOSIS — B36.9 FUNGAL DERMATITIS: Primary | ICD-10-CM

## 2025-04-01 RX ORDER — FLUCONAZOLE 100 MG/1
100 TABLET ORAL DAILY
Qty: 7 TABLET | Refills: 0 | Status: SHIPPED | OUTPATIENT
Start: 2025-04-01 | End: 2025-04-08

## 2025-04-08 DIAGNOSIS — G62.9 NEUROPATHY: ICD-10-CM

## 2025-04-08 DIAGNOSIS — E78.2 MIXED HYPERLIPIDEMIA: ICD-10-CM

## 2025-04-09 RX ORDER — ROSUVASTATIN CALCIUM 5 MG/1
5 TABLET, COATED ORAL DAILY
Qty: 90 TABLET | Refills: 1 | Status: SHIPPED | OUTPATIENT
Start: 2025-04-09

## 2025-04-09 RX ORDER — GABAPENTIN 100 MG/1
100 CAPSULE ORAL
Qty: 90 CAPSULE | Refills: 1 | Status: SHIPPED | OUTPATIENT
Start: 2025-04-09

## 2025-05-04 DIAGNOSIS — R06.2 WHEEZE: ICD-10-CM

## 2025-05-04 DIAGNOSIS — J45.909 UNCOMPLICATED ASTHMA, UNSPECIFIED ASTHMA SEVERITY, UNSPECIFIED WHETHER PERSISTENT: ICD-10-CM

## 2025-05-05 RX ORDER — IPRATROPIUM BROMIDE AND ALBUTEROL SULFATE 2.5; .5 MG/3ML; MG/3ML
SOLUTION RESPIRATORY (INHALATION)
Qty: 270 ML | Refills: 5 | Status: SHIPPED | OUTPATIENT
Start: 2025-05-05

## 2025-05-06 ENCOUNTER — TELEPHONE (OUTPATIENT)
Age: 80
End: 2025-05-06

## 2025-05-06 NOTE — TELEPHONE ENCOUNTER
Please advise that  even though she did have it in the past it may be helpful to update the pneum 20 .

## 2025-05-06 NOTE — TELEPHONE ENCOUNTER
Patient advised that she can  her prescription from pharmacy, Patient would like to know if Dr Ceron recommends her to get Prevnar 20.

## 2025-05-06 NOTE — TELEPHONE ENCOUNTER
I spoke with Patient, she was unsure of which vaccine pharmacy recommend that she get. Patient stated that the pharmacy stated that medicare would not cover her Duo-Neb solution with a pneumonia vaccine. I advised Patient that I would contact the pharmacy and get the name of the recommended vaccine. Patient gave me permission to leave a detailed message when I call back.

## 2025-05-06 NOTE — TELEPHONE ENCOUNTER
I believe she is referring to Capvaxive (pneum 21)--please confirm and if so  let her know it may provide some additional protection on top of what she has already had w past vaccines--she will need to check w pharmacist re: insurance coverage.

## 2025-05-06 NOTE — TELEPHONE ENCOUNTER
Patient calling stating that the pharmacy wants patient to get the new pneumonia vaccine. She wanted to double check with Dr. Ceron to make sure she was not due for a new vaccine.  Milagro Angelo  Please call patient back.  .

## 2025-05-27 ENCOUNTER — TELEPHONE (OUTPATIENT)
Age: 80
End: 2025-05-27

## 2025-05-27 NOTE — TELEPHONE ENCOUNTER
Patient called in stated rash under breast is still there and has to have testing for cardiology but can not schedule till rash is gone. Patient scheduled for 06/02 but would like to be seen soon so she can get echocardiogram. Please advise. Thank you.

## 2025-05-27 NOTE — TELEPHONE ENCOUNTER
Offered an earlier appointment with another provider but patient would like to see Dr. Ceron for this.

## 2025-05-28 ENCOUNTER — OFFICE VISIT (OUTPATIENT)
Dept: FAMILY MEDICINE CLINIC | Facility: CLINIC | Age: 80
End: 2025-05-28
Payer: MEDICARE

## 2025-05-28 VITALS
SYSTOLIC BLOOD PRESSURE: 144 MMHG | BODY MASS INDEX: 26.8 KG/M2 | RESPIRATION RATE: 12 BRPM | DIASTOLIC BLOOD PRESSURE: 62 MMHG | HEIGHT: 64 IN | OXYGEN SATURATION: 94 % | TEMPERATURE: 97.7 F | HEART RATE: 69 BPM | WEIGHT: 157 LBS

## 2025-05-28 DIAGNOSIS — B36.9 FUNGAL DERMATITIS: Primary | ICD-10-CM

## 2025-05-28 PROCEDURE — G2211 COMPLEX E/M VISIT ADD ON: HCPCS | Performed by: FAMILY MEDICINE

## 2025-05-28 PROCEDURE — 99213 OFFICE O/P EST LOW 20 MIN: CPT | Performed by: FAMILY MEDICINE

## 2025-05-28 RX ORDER — FLUCONAZOLE 150 MG/1
TABLET ORAL
Qty: 7 TABLET | Refills: 0 | Status: SHIPPED | OUTPATIENT
Start: 2025-05-28 | End: 2025-06-28

## 2025-05-28 NOTE — PROGRESS NOTES
"Name: Polly Mitchell      : 1945      MRN: [id number]  Encounter Provider: Nehal Ceron MD  Encounter Date: 2025   Encounter department: [facility]  :  Assessment & Plan  Fungal dermatitis  OTC barrier cream -eg zinc oxide  OTC [employer]'s powder  Trial aveeno soap and/or dove sensitive soap  Orders:  •  fluconazole (DIFLUCAN) 150 mg tablet; Take one tablet. Rpt dose in 3-5 days if needed for up to 7 doses over next 2-3 weeks.           History of Present Illness   Rash  This is a recurrent problem. The current episode started more than 1 month ago. The problem has been waxing and waning since onset. The affected locations include the chest. The rash is characterized by redness, itchiness and burning. Associated symptoms include itching. Pertinent negatives include no fever. Past treatments include antihistamine, topical steroids, anti-itch cream, antibiotics and analgesics (anti-fungal cream and tablets). Her past medical history is significant for allergies and eczema. There were no sick contacts.     Review of Systems   Constitutional:  Negative for fever.   Skin:  Positive for itching and rash.       Objective   /62 (BP Location: Left arm, Patient Position: Sitting, Cuff Size: Large)   Pulse 69   Temp 97.7 °F (36.5 °C) (Temporal)   Resp 12   Ht 5' 4\" (1.626 m)   Wt 71.2 kg (157 lb)   SpO2 94%   BMI 26.95 kg/m²      Physical Exam  Vitals and nursing note reviewed.   Constitutional:       General: She is not in acute distress.     Appearance: Normal appearance.     Eyes:      General: No scleral icterus.     Conjunctiva/sclera: Conjunctivae normal.       Cardiovascular:      Rate and Rhythm: Normal rate and regular rhythm.   Pulmonary:      Effort: Pulmonary effort is normal. No respiratory distress.     Skin:     General: Skin is warm and dry.      Findings: Rash (red, mist, slightly peeling rash below breasts-L>R) present.     Neurological:      Mental " Status: She is alert and oriented to person, place, and time. Mental status is at baseline.     Psychiatric:         Mood and Affect: Mood normal.

## 2025-07-08 NOTE — PROGRESS NOTES
Assessment  1  Dysuria (788 1) (R30 0)   2  Acute bronchitis (466 0) (J20 9)    Plan   Dysuria    · Urine Dip Automated- POC; Status:Complete;   Done: 26CNT2260 11:43AM    Sulfamethoxazole-Trimethoprim 800-160 MG Oral Tablet; 1tab po bid; Therapy: 16Pzy7198 to (Last Rx:24Tfd1479)  Requested for: 27Zso7641; Status: ACTIVE - Transmit to Pharmacy - Awaiting Verification Ordered  Rx By: Isma Tijerina; Dispense: 0 Days ; #:14 Tablet; Refill: 0;   For: Acute bronchitis, Dysuria; ROSCOE = N; Sent To: [facility]     Discussion/Summary    Check urine cxfluids, nasal salinep abx -sooner prn  Possible side effects of new medications were reviewed with the patient/guardian today  The treatment plan was reviewed with the patient/guardian  The patient/guardian understands and agrees with the treatment plan      Chief Complaint  patient presenting for follow up from her cold/congestion   Patient was away when she was sick and was given medication for it , Patient is also c/o abdominal pain started this morning sl/cma      History of Present Illness  Del Muñoz presents with complaints of dysuria  Associated symptoms include urgency,-- frequency,-- suprapubic pain,-- abdominal pain-- and-- nausea, but-- no fever,-- no lower back pain-- and-- no vomiting  Del Muñoz presents with complaints of cough  Associated symptoms include runny nose,-- stuffy nose,-- sore throat,-- postnasal drainage-- and-- hoarseness, but-- no fever,-- no vomiting-- and-- no hemoptysis  Active Problems  1  Acute bronchitis (466 0) (J20 9)   2  Asthma (493 90) (J45 909)   3  Benign essential hypertension (401 1) (I10)   4  Borderline hyperlipidemia (272 4) (E78 5)   5  Depression with anxiety (300 4) (F41 8)   6  DM type 2 with diabetic mixed hyperlipidemia (250 80,272  2) (E11 69,E78 2)   7  Eczema (692 9) (L30 9)   8  Elevated ALT measurement (790 4) (R74 0)   9   Encounter for other screening for malignant neoplasm of We strengthened your carb ratio    Follow up in 6 months    Dilma Zamudio MD  Divison of Endocrinology   [facility]   Phone: [phone]    option 4, then option 1  Fax: [phone]       breast (V76 19) (Z12 39)   10  Encounter for screening colonoscopy (V76 51) (Z12 11)   11  Encounter for screening mammogram for malignant neoplasm of breast (V76 12)    (Z12 31)   12  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)   13  Gastroesophageal reflux disease with esophagitis (530 11) (K21 0)   14  Hair loss (704 00) (L65 9)   15  Hyperlipidemia (272 4) (E78 5)   16  Medicare annual wellness visit, subsequent (V70 0) (Z00 00)   17  Mitral valve disorder (424 0) (I05 9)   18  Nonalcoholic fatty liver disease (571 8) (K76 0)   19  Oral thrush (112 0) (B37 0)   20  Osteoporosis (733 00) (M81 0)   21  Pulmonary nodule seen on imaging study (793 11) (R91 1)   22  Risk of exposure to Lyme disease (V15 89) (Z91 89)   23  Severe persistent asthma with acute exacerbation (493 92) (J45 51)   24  Suspected sleep apnea (780 57) (G47 30)   25  Type 2 diabetes mellitus (250 00) (E11 9)   26  Vitamin D deficiency (268 9) (E55 9)    Past Medical History  1  History of Abdominal pain, epigastric (789 06) (R10 13)   2  History of Abdominal pain, LLQ (left lower quadrant) (789 04) (R10 32)   3  History of Acute febrile illness (780 60) (R50 9)   4  History of Acute upper respiratory infection (465 9) (J06 9)   5  History of Asthma with acute exacerbation (493 92) (J45 901)   6  History of Asthmatic bronchitis (493 90) (J45 909)   7  History of Bilateral renal cysts (753 10) (N28 1)   8  History of Bloating (787 3) (R14 0)   9  History of BMI 38 0-38 9,adult (V85 38) (Z68 38)   10  History of Body mass index (BMI) of 34 0 to 34 9 in adult (V85 34) (Z68 34)   11  History of Body mass index (BMI) of 37 0 to 37 9 in adult (V85 37) (Z68 37)   12  History of Cough (786 2) (R05)   13  History of Cough (786 2) (R05)   14  History of Eczema (692 9) (L30 9)   15  History of Female pelvic pain (625 9) (R10 2)   16  History of Hirsutism (704 1) (L68 0)   17  History of acute bronchitis (V12 69) (Z87 09)   18   History of acute bronchitis (V12 69) (Z87 09)   19  History of acute bronchitis (V12 69) (Z87 09)   20  History of acute pharyngitis (V12 69) (Z87 09)   21  History of acute sinusitis (V12 69) (Z87 09)   22  History of allergic rhinitis (V12 69) (Z87 09)   23  History of arthritis (V13 4) (Z87 39)   24  History of dermatitis (V13 3) (Z87 2)   25  History of fever (V13 89) (Z87 898)   26  History of heartburn (V12 79) (Z87 898)   27  History of hiatal hernia (V12 79) (Z87 19)   28  History of pleurisy (V12 69) (Z87 09)   29  History of pneumonia (V12 61) (Z87 01)   30  History of sinusitis (V12 69) (Z87 09)   31  History of Hot flashes (627 2) (N95 1)   32  History of Nasal congestion (478 19) (R09 81)   33  History of Right-sided chest wall pain (786 52) (R07 89)   34  History of Sinusitis (473 9) (J32 9)   35  History of Sore in nose (478 19) (J34 89)   36  History of Umbilical hernia (399 0) (K42 9)    Surgical History  1  History of Appendectomy   2  History of Incisional Breast Biopsy   3  History of Ovarian Cystectomy   4  History of Tonsillectomy    Family History  Mother    1  Family history of Benign essential hypertension with target blood pressure below 140/90   2  Family history of Ovarian Cancer (V16 41)  Father    3  Family history of Benign essential hypertension with target blood pressure below 140/90    Social History   · Being A Social Drinker   · Daily alcohol use   · Former smoker (V15 82) (X10 507)   · Marital History - Currently    ·    · Occupation   · Denied: History of Pets / animals   · Denied: History of Second hand smoke exposure   · Denied: History of Travel history    Current Meds   1  Advair Diskus 500-50 MCG/DOSE Inhalation Aerosol Powder Breath Activated; INHALE 1   PUFF TWICE DAILY; Therapy: 21Jun2017 to (Last Rx:21Jun2017) Ordered   2  Albuterol Sulfate (2 5 MG/3ML) 0 083% Inhalation Nebulization Solution; USE 1 VIAL VIA   NEBULIZER FOUR TIMES DAILY AS NEEDED;    Therapy: 76KFJ1290 to (Evaluate:21Jun2017)  Requested for: 93BBR6673; Last   Rx:10May2017 Ordered   3  ALPRAZolam 0 5 MG Oral Tablet; ONE TAB PO BID PRN; Therapy: 11FUM5347 to (Last Rx:21Jun2017) Ordered   4  AmLODIPine Besylate 5 MG Oral Tablet; One tab po qd; Therapy: 02Apr2012 to (Last Rx:10May2017)  Requested for: 94PPJ7370 Ordered   5  Betamethasone Dipropionate 0 05 % External Cream; APPLY SPARINGLY TO AFFECTED   AREA(S) TWICE DAILY; Therapy: 38JOM0567 to (Last Rx:10May2017)  Requested for: 26KRU2627 Ordered   6  Biotin 5000 MCG Oral Capsule; TAKE 2 CAPSULE Daily; Therapy: (Recorded:04Nov2016) to Recorded   7  Caltrate 600+D 600-400 MG-UNIT TABS; 1 Two Times A Day; Therapy: 59QDI7799 to  Requested for: 95JVN5216 Recorded   8  Clotrimazole-Betamethasone 1-0 05 % External Cream; APPLY SPARINGLY TO THE   AFFECTED AREA(S) TWICE DAILY; Therapy: 16IRI1197 to (Evaluate:07Sep2017)  Requested for: 68UFP3222; Last   Rx:10May2017 Ordered   9  Magnesium 250 MG Oral Tablet; Therapy: (Recorded:64Kql0901) to Recorded   10  Montelukast Sodium 10 MG Oral Tablet; 1 EVERY MORNING; Therapy: 02Apr2012 to (Last Rx:10May2017)  Requested for: 12OJY8786 Ordered   11  Omeprazole 40 MG Oral Capsule Delayed Release; one cap po qd; Last Rx:10May2017    Ordered   12  Simvastatin 20 MG Oral Tablet; One tab po qd; Therapy: 02Apr2012 to (Last Rx:10May2017)  Requested for: 24ITV0911 Ordered   13  Vitamin B Complex Oral Tablet; 1 Every Day; Therapy: 44GCC1043 to  Requested for: 69HFC3202 Recorded   14  Vitamin D-1000 Max St 1000 UNIT Oral Tablet; 2 Every Morning; Therapy: 55SGT9372 to  Requested for: 80UQH4831 Recorded   15  Zyrtec 10 MG TABS; one qd; Therapy: 35Hed6227 to  Requested for: 95PTD1269 Recorded    Allergies  1  ASPIRIN   2  CIPRO   3  Cimetidine TABS  4  Eggs   5  Hazelnuts   6   Nuts    Vitals  Vital Signs    Recorded: 17Ani8723 10:37AM   Temperature 97 5 F   Heart Rate 84   Respiration 16   Systolic 969   Diastolic 72 Height 5 ft 4 in   Weight 199 lb 6 4 oz   BMI Calculated 34 23   BSA Calculated 1 95   O2 Saturation 97, RA     Physical Exam    Constitutional   General appearance: No acute distress, well appearing and well nourished  acutely ill-- and-- appears tired  Ears, Nose, Mouth, and Throat   Nasal mucosa, septum, and turbinates: Abnormal   The bilateral nasal mucosa was boggy  -- facial tenderness  Oropharynx: Abnormal   The posterior pharynx was erythematous-- and-- pngtt, but-- did not have an exudate  Pulmonary occ exp wheeze/coarse rhonchi  Cardiovascular RRR  Abdomen   Abdomen: Abnormal   Bowel sounds were normal  There was mild tenderness in the suprapubic area  The abdomen was not firm  No guarding  The abdomen did not have ascites  no CVA tenderness        Results/Data  Urine Dip Automated- POC 42Utf9192 11:43AM Marcelina Purvis     Test Name Result Flag Reference   Color Clear     Clarity Transparent     Leukocytes 75     Nitrite neg     Blood neg     Bilirubin 1     Urobilinogen norm     Protein neg     Ph 5     Specific Gravity 1 020     Ketone neg     Glucose norm       PHQ-9 Adult Depression Screening 23Akl8256 10:40AM User, [employer]     Test Name Result Flag Reference   PHQ-9 Adult Depression Score 0     Over the last two weeks, how often have you been bothered by any of the following problems? Little interest or pleasure in doing things: Not at all - 0  Feeling down, depressed, or hopeless: Not at all - 0  Trouble falling or staying asleep, or sleeping too much: Not at all - 0  Feeling tired or having little energy: Not at all - 0  Poor appetite or over eating: Not at all - 0  Feeling bad about yourself - or that you are a failure or have let yourself or your family down: Not at all - 0  Trouble concentrating on things, such as reading the newspaper or watching television: Not at all - 0  Moving or speaking so slowly that other people could have noticed   Or the opposite -  being so fidgety or restless that you have been moving around a lot more than usual: Not at all - 0  Thoughts that you would be better off dead, or of hurting yourself in some way: Not at all - 0   PHQ-9 Adult Depression Screening Negative     PHQ-9 Difficulty Level Not difficult at all     PHQ-9 Severity No Depression         Health Management  Encounter for screening mammogram for malignant neoplasm of breast   * MAMMO SCREENING BILATERAL W CAD; every 1 year; Last 04JRQ7361; Next Due: 29KCW4590;   Active    Signatures   Electronically signed by : RONNY Constantino ; Sep 24 2017  5:46PM EST                       (Author)

## 2025-07-25 ENCOUNTER — TELEPHONE (OUTPATIENT)
Age: 80
End: 2025-07-25

## 2025-07-25 NOTE — TELEPHONE ENCOUNTER
Patient called for new patient appointment.   Offered soonest appointment.    Patient will seek treatment at another location.

## 2025-07-25 NOTE — TELEPHONE ENCOUNTER
Patient called in stated called to make dermatologist appointment with provider jhony and the provider is no longer going to be with practice and would like another recommendation from Dr. Ceron. Please advise. Thank you